# Patient Record
Sex: FEMALE | Race: WHITE | URBAN - METROPOLITAN AREA
[De-identification: names, ages, dates, MRNs, and addresses within clinical notes are randomized per-mention and may not be internally consistent; named-entity substitution may affect disease eponyms.]

---

## 2017-04-24 ENCOUNTER — HOSPITAL ENCOUNTER (OUTPATIENT)
Dept: WOUND CARE | Age: 57
Discharge: HOME | End: 2017-04-24
Attending: SURGERY | Admitting: SURGERY

## 2017-04-24 VITALS
WEIGHT: 293 LBS | DIASTOLIC BLOOD PRESSURE: 105 MMHG | SYSTOLIC BLOOD PRESSURE: 197 MMHG | HEART RATE: 88 BPM | RESPIRATION RATE: 18 BRPM

## 2017-04-24 DIAGNOSIS — M79.605 PAIN AND SWELLING OF LEFT LOWER EXTREMITY: Primary | ICD-10-CM

## 2017-04-24 DIAGNOSIS — I87.332 CHRONIC VENOUS HYPERTENSION (IDIOPATHIC) WITH ULCER AND INFLAMMATION OF LEFT LOWER EXTREMITY (HCC): ICD-10-CM

## 2017-04-24 DIAGNOSIS — M79.604 PAIN AND SWELLING OF LOWER EXTREMITY, RIGHT: ICD-10-CM

## 2017-04-24 DIAGNOSIS — M79.89 PAIN AND SWELLING OF LOWER EXTREMITY, RIGHT: ICD-10-CM

## 2017-04-24 DIAGNOSIS — L97.929 CHRONIC VENOUS HYPERTENSION (IDIOPATHIC) WITH ULCER AND INFLAMMATION OF LEFT LOWER EXTREMITY (HCC): ICD-10-CM

## 2017-04-24 DIAGNOSIS — M79.89 PAIN AND SWELLING OF LEFT LOWER EXTREMITY: Primary | ICD-10-CM

## 2017-04-24 PROCEDURE — 99203 OFFICE O/P NEW LOW 30 MIN: CPT | Performed by: SURGERY

## 2017-04-24 PROCEDURE — 11045 DBRDMT SUBQ TISS EACH ADDL: CPT | Performed by: SURGERY

## 2017-04-24 PROCEDURE — 11042 DBRDMT SUBQ TIS 1ST 20SQCM/<: CPT | Performed by: SURGERY

## 2017-04-24 RX ORDER — CELECOXIB 100 MG/1
200 CAPSULE ORAL 2 TIMES DAILY
COMMUNITY
End: 2017-11-14 | Stop reason: DRUGHIGH

## 2017-04-24 RX ORDER — DOXYCYCLINE HYCLATE 100 MG/1
100 CAPSULE ORAL 2 TIMES DAILY
COMMUNITY
Start: 2017-04-19 | End: 2017-05-03

## 2017-04-24 RX ORDER — LOSARTAN POTASSIUM 50 MG/1
100 TABLET ORAL DAILY
COMMUNITY
End: 2017-11-14 | Stop reason: DRUGHIGH

## 2017-04-24 RX ORDER — LIDOCAINE HYDROCHLORIDE 40 MG/ML
SOLUTION TOPICAL ONCE
Status: DISCONTINUED | OUTPATIENT
Start: 2017-04-24 | End: 2017-04-25 | Stop reason: HOSPADM

## 2017-04-27 ENCOUNTER — HOSPITAL ENCOUNTER (OUTPATIENT)
Dept: WOUND CARE | Age: 57
Discharge: HOME | End: 2017-04-27
Attending: SURGERY | Admitting: SURGERY

## 2017-04-27 VITALS
HEART RATE: 86 BPM | SYSTOLIC BLOOD PRESSURE: 179 MMHG | DIASTOLIC BLOOD PRESSURE: 101 MMHG | RESPIRATION RATE: 16 BRPM | TEMPERATURE: 98.3 F

## 2017-05-01 ENCOUNTER — HOSPITAL ENCOUNTER (OUTPATIENT)
Dept: WOUND CARE | Age: 57
Discharge: HOME | End: 2017-05-01
Attending: SURGERY | Admitting: SURGERY

## 2017-05-01 VITALS — HEART RATE: 79 BPM | DIASTOLIC BLOOD PRESSURE: 94 MMHG | SYSTOLIC BLOOD PRESSURE: 185 MMHG | RESPIRATION RATE: 18 BRPM

## 2017-05-01 DIAGNOSIS — I87.332 CHRONIC VENOUS HYPERTENSION (IDIOPATHIC) WITH ULCER AND INFLAMMATION OF LEFT LOWER EXTREMITY (HCC): Primary | ICD-10-CM

## 2017-05-01 DIAGNOSIS — L97.929 CHRONIC VENOUS HYPERTENSION (IDIOPATHIC) WITH ULCER AND INFLAMMATION OF LEFT LOWER EXTREMITY (HCC): Primary | ICD-10-CM

## 2017-05-01 PROCEDURE — 11042 DBRDMT SUBQ TIS 1ST 20SQCM/<: CPT | Performed by: SURGERY

## 2017-05-01 RX ORDER — LIDOCAINE HYDROCHLORIDE 40 MG/ML
SOLUTION TOPICAL ONCE
Status: DISCONTINUED | OUTPATIENT
Start: 2017-05-01 | End: 2017-05-02 | Stop reason: HOSPADM

## 2017-05-04 ENCOUNTER — HOSPITAL ENCOUNTER (OUTPATIENT)
Dept: WOUND CARE | Age: 57
Discharge: HOME | End: 2017-05-04
Attending: SURGERY | Admitting: SURGERY

## 2017-05-04 ENCOUNTER — HOSPITAL ENCOUNTER (OUTPATIENT)
Dept: VASCULAR LAB | Age: 57
Discharge: HOME | End: 2017-05-04
Attending: SURGERY | Admitting: SURGERY

## 2017-05-04 DIAGNOSIS — M79.604 PAIN AND SWELLING OF LOWER EXTREMITY, RIGHT: ICD-10-CM

## 2017-05-04 DIAGNOSIS — M79.605 PAIN AND SWELLING OF LEFT LOWER EXTREMITY: ICD-10-CM

## 2017-05-04 DIAGNOSIS — M79.605 PAIN OF LEFT LEG: ICD-10-CM

## 2017-05-04 DIAGNOSIS — M79.89 PAIN AND SWELLING OF LEFT LOWER EXTREMITY: ICD-10-CM

## 2017-05-04 DIAGNOSIS — M79.89 PAIN AND SWELLING OF LOWER EXTREMITY, RIGHT: ICD-10-CM

## 2017-05-08 ENCOUNTER — HOSPITAL ENCOUNTER (OUTPATIENT)
Dept: WOUND CARE | Age: 57
Discharge: HOME | End: 2017-05-08
Attending: SURGERY | Admitting: SURGERY

## 2017-05-08 VITALS
TEMPERATURE: 98.9 F | SYSTOLIC BLOOD PRESSURE: 187 MMHG | RESPIRATION RATE: 16 BRPM | DIASTOLIC BLOOD PRESSURE: 87 MMHG | HEART RATE: 89 BPM

## 2017-05-08 DIAGNOSIS — L97.929 CHRONIC VENOUS HYPERTENSION (IDIOPATHIC) WITH ULCER AND INFLAMMATION OF LEFT LOWER EXTREMITY (HCC): Primary | ICD-10-CM

## 2017-05-08 DIAGNOSIS — I87.332 CHRONIC VENOUS HYPERTENSION (IDIOPATHIC) WITH ULCER AND INFLAMMATION OF LEFT LOWER EXTREMITY (HCC): Primary | ICD-10-CM

## 2017-05-08 PROCEDURE — 11042 DBRDMT SUBQ TIS 1ST 20SQCM/<: CPT | Performed by: SURGERY

## 2017-05-08 RX ORDER — LIDOCAINE HYDROCHLORIDE 40 MG/ML
SOLUTION TOPICAL ONCE
Status: DISCONTINUED | OUTPATIENT
Start: 2017-05-08 | End: 2017-05-09 | Stop reason: HOSPADM

## 2017-05-15 ENCOUNTER — HOSPITAL ENCOUNTER (OUTPATIENT)
Dept: WOUND CARE | Age: 57
Discharge: HOME | End: 2017-05-15
Attending: SURGERY | Admitting: SURGERY

## 2017-05-15 VITALS — DIASTOLIC BLOOD PRESSURE: 87 MMHG | SYSTOLIC BLOOD PRESSURE: 194 MMHG | RESPIRATION RATE: 17 BRPM | HEART RATE: 83 BPM

## 2017-05-15 DIAGNOSIS — L97.929 CHRONIC VENOUS HYPERTENSION (IDIOPATHIC) WITH ULCER AND INFLAMMATION OF LEFT LOWER EXTREMITY (HCC): Primary | ICD-10-CM

## 2017-05-15 DIAGNOSIS — I87.332 CHRONIC VENOUS HYPERTENSION (IDIOPATHIC) WITH ULCER AND INFLAMMATION OF LEFT LOWER EXTREMITY (HCC): Primary | ICD-10-CM

## 2017-05-15 PROCEDURE — 11042 DBRDMT SUBQ TIS 1ST 20SQCM/<: CPT | Performed by: SURGERY

## 2017-05-15 RX ORDER — LIDOCAINE HYDROCHLORIDE 40 MG/ML
SOLUTION TOPICAL ONCE
Status: DISCONTINUED | OUTPATIENT
Start: 2017-05-15 | End: 2017-05-16 | Stop reason: HOSPADM

## 2017-05-18 ENCOUNTER — TELEPHONE (OUTPATIENT)
Dept: SURGERY | Age: 57
End: 2017-05-18

## 2017-05-22 ENCOUNTER — HOSPITAL ENCOUNTER (OUTPATIENT)
Dept: WOUND CARE | Age: 57
Discharge: HOME | End: 2017-05-22
Attending: SURGERY | Admitting: SURGERY

## 2017-05-22 VITALS — TEMPERATURE: 99.3 F | SYSTOLIC BLOOD PRESSURE: 175 MMHG | RESPIRATION RATE: 16 BRPM | DIASTOLIC BLOOD PRESSURE: 97 MMHG

## 2017-05-22 DIAGNOSIS — L97.929 CHRONIC VENOUS HYPERTENSION (IDIOPATHIC) WITH ULCER AND INFLAMMATION OF LEFT LOWER EXTREMITY (HCC): Primary | ICD-10-CM

## 2017-05-22 DIAGNOSIS — I87.332 CHRONIC VENOUS HYPERTENSION (IDIOPATHIC) WITH ULCER AND INFLAMMATION OF LEFT LOWER EXTREMITY (HCC): Primary | ICD-10-CM

## 2017-05-22 PROCEDURE — 11042 DBRDMT SUBQ TIS 1ST 20SQCM/<: CPT | Performed by: SURGERY

## 2017-05-22 RX ORDER — LIDOCAINE HYDROCHLORIDE 40 MG/ML
SOLUTION TOPICAL ONCE
Status: DISCONTINUED | OUTPATIENT
Start: 2017-05-22 | End: 2017-05-23 | Stop reason: HOSPADM

## 2017-05-30 ENCOUNTER — HOSPITAL ENCOUNTER (OUTPATIENT)
Dept: WOUND CARE | Age: 57
Discharge: HOME | End: 2017-05-30
Attending: SURGERY | Admitting: SURGERY

## 2017-05-30 VITALS — RESPIRATION RATE: 16 BRPM | DIASTOLIC BLOOD PRESSURE: 94 MMHG | SYSTOLIC BLOOD PRESSURE: 190 MMHG | HEART RATE: 79 BPM

## 2017-05-30 PROCEDURE — 11042 DBRDMT SUBQ TIS 1ST 20SQCM/<: CPT | Performed by: SURGERY

## 2017-05-30 RX ORDER — HYDROCODONE BITARTRATE AND ACETAMINOPHEN 5; 325 MG/1; MG/1
1 TABLET ORAL EVERY 6 HOURS PRN
Qty: 20 TABLET | Refills: 0 | Status: SHIPPED | OUTPATIENT
Start: 2017-05-30 | End: 2017-10-02 | Stop reason: ALTCHOICE

## 2017-05-30 RX ORDER — LIDOCAINE HYDROCHLORIDE 40 MG/ML
SOLUTION TOPICAL ONCE
Status: DISCONTINUED | OUTPATIENT
Start: 2017-05-30 | End: 2017-05-31 | Stop reason: HOSPADM

## 2017-06-05 ENCOUNTER — HOSPITAL ENCOUNTER (OUTPATIENT)
Dept: WOUND CARE | Age: 57
Discharge: HOME | End: 2017-06-05
Attending: SURGERY | Admitting: SURGERY

## 2017-06-05 VITALS — DIASTOLIC BLOOD PRESSURE: 93 MMHG | HEART RATE: 79 BPM | SYSTOLIC BLOOD PRESSURE: 183 MMHG | RESPIRATION RATE: 16 BRPM

## 2017-06-05 DIAGNOSIS — L97.929 VENOUS ULCER OF LEFT LEG (HCC): Primary | ICD-10-CM

## 2017-06-05 DIAGNOSIS — I83.029 VENOUS ULCER OF LEFT LEG (HCC): Primary | ICD-10-CM

## 2017-06-05 PROCEDURE — 11042 DBRDMT SUBQ TIS 1ST 20SQCM/<: CPT | Performed by: SURGERY

## 2017-06-05 RX ORDER — LIDOCAINE HYDROCHLORIDE 40 MG/ML
SOLUTION TOPICAL ONCE
Status: DISCONTINUED | OUTPATIENT
Start: 2017-06-05 | End: 2017-06-06 | Stop reason: HOSPADM

## 2017-06-08 ENCOUNTER — TELEPHONE (OUTPATIENT)
Dept: SURGERY | Age: 57
End: 2017-06-08

## 2017-06-12 ENCOUNTER — HOSPITAL ENCOUNTER (OUTPATIENT)
Dept: WOUND CARE | Age: 57
Discharge: HOME | End: 2017-06-12
Attending: PHYSICIAN ASSISTANT | Admitting: PHYSICIAN ASSISTANT

## 2017-06-12 VITALS — DIASTOLIC BLOOD PRESSURE: 83 MMHG | HEART RATE: 88 BPM | RESPIRATION RATE: 16 BRPM | SYSTOLIC BLOOD PRESSURE: 163 MMHG

## 2017-06-12 RX ORDER — LIDOCAINE HYDROCHLORIDE 40 MG/ML
SOLUTION TOPICAL ONCE
Status: DISCONTINUED | OUTPATIENT
Start: 2017-06-12 | End: 2017-06-13 | Stop reason: HOSPADM

## 2017-06-20 ENCOUNTER — HOSPITAL ENCOUNTER (OUTPATIENT)
Dept: CARDIAC CATH/INVASIVE PROCEDURES | Age: 57
Discharge: HOME | End: 2017-06-20
Attending: SURGERY | Admitting: SURGERY

## 2017-06-20 VITALS — BODY MASS INDEX: 48.82 KG/M2 | HEIGHT: 65 IN | WEIGHT: 293 LBS

## 2017-06-20 DIAGNOSIS — I87.332 CHRONIC VENOUS HYPERTENSION (IDIOPATHIC) WITH ULCER AND INFLAMMATION OF LEFT LOWER EXTREMITY (HCC): ICD-10-CM

## 2017-06-20 DIAGNOSIS — L97.929 VENOUS ULCER OF LEFT LEG (HCC): Primary | ICD-10-CM

## 2017-06-20 DIAGNOSIS — I83.029 VENOUS ULCER OF LEFT LEG (HCC): Primary | ICD-10-CM

## 2017-06-20 DIAGNOSIS — L97.929 CHRONIC VENOUS HYPERTENSION (IDIOPATHIC) WITH ULCER AND INFLAMMATION OF LEFT LOWER EXTREMITY (HCC): ICD-10-CM

## 2017-06-20 PROCEDURE — 36478 ENDOVENOUS LASER 1ST VEIN: CPT | Performed by: SURGERY

## 2017-06-20 RX ORDER — MORPHINE SULFATE 10 MG/ML
2 INJECTION, SOLUTION INTRAMUSCULAR; INTRAVENOUS
Status: ACTIVE | OUTPATIENT
Start: 2017-06-20 | End: 2017-06-20

## 2017-06-20 RX ORDER — SODIUM CHLORIDE 0.9 % (FLUSH) 0.9 %
10 SYRINGE (ML) INJECTION PRN
Status: DISCONTINUED | OUTPATIENT
Start: 2017-06-20 | End: 2017-06-21 | Stop reason: HOSPADM

## 2017-06-20 RX ORDER — NAPROXEN 500 MG/1
500 TABLET ORAL EVERY 12 HOURS PRN
Qty: 20 TABLET | Refills: 3 | Status: SHIPPED | OUTPATIENT
Start: 2017-06-20 | End: 2018-06-20

## 2017-06-20 RX ORDER — FENTANYL CITRATE 50 UG/ML
25 INJECTION, SOLUTION INTRAMUSCULAR; INTRAVENOUS
Status: ACTIVE | OUTPATIENT
Start: 2017-06-20 | End: 2017-06-20

## 2017-06-20 RX ORDER — SODIUM CHLORIDE 9 MG/ML
INJECTION, SOLUTION INTRAVENOUS CONTINUOUS
Status: DISCONTINUED | OUTPATIENT
Start: 2017-06-20 | End: 2017-06-21 | Stop reason: HOSPADM

## 2017-06-20 RX ORDER — ONDANSETRON 2 MG/ML
4 INJECTION INTRAMUSCULAR; INTRAVENOUS EVERY 6 HOURS PRN
Status: DISCONTINUED | OUTPATIENT
Start: 2017-06-20 | End: 2017-06-21 | Stop reason: HOSPADM

## 2017-06-20 RX ORDER — SODIUM CHLORIDE 0.9 % (FLUSH) 0.9 %
10 SYRINGE (ML) INJECTION EVERY 12 HOURS SCHEDULED
Status: DISCONTINUED | OUTPATIENT
Start: 2017-06-20 | End: 2017-06-21 | Stop reason: HOSPADM

## 2017-06-20 RX ORDER — ACETAMINOPHEN 325 MG/1
650 TABLET ORAL EVERY 4 HOURS PRN
Status: DISCONTINUED | OUTPATIENT
Start: 2017-06-20 | End: 2017-06-21 | Stop reason: HOSPADM

## 2017-06-20 RX ORDER — 0.9 % SODIUM CHLORIDE 0.9 %
500 INTRAVENOUS SOLUTION INTRAVENOUS PRN
Status: DISCONTINUED | OUTPATIENT
Start: 2017-06-20 | End: 2017-06-21 | Stop reason: HOSPADM

## 2017-06-26 ENCOUNTER — HOSPITAL ENCOUNTER (OUTPATIENT)
Dept: WOUND CARE | Age: 57
Discharge: HOME | End: 2017-06-26
Attending: SURGERY | Admitting: SURGERY

## 2017-06-26 ENCOUNTER — PROCEDURE VISIT (OUTPATIENT)
Dept: SURGERY | Age: 57
End: 2017-06-26

## 2017-06-26 VITALS — SYSTOLIC BLOOD PRESSURE: 157 MMHG | HEART RATE: 98 BPM | DIASTOLIC BLOOD PRESSURE: 95 MMHG | RESPIRATION RATE: 16 BRPM

## 2017-06-26 DIAGNOSIS — L97.929 VENOUS ULCER OF LEFT LEG (HCC): Primary | ICD-10-CM

## 2017-06-26 DIAGNOSIS — L97.929 CHRONIC VENOUS HYPERTENSION (IDIOPATHIC) WITH ULCER AND INFLAMMATION OF LEFT LOWER EXTREMITY (HCC): Primary | ICD-10-CM

## 2017-06-26 DIAGNOSIS — I87.332 CHRONIC VENOUS HYPERTENSION (IDIOPATHIC) WITH ULCER AND INFLAMMATION OF LEFT LOWER EXTREMITY (HCC): Primary | ICD-10-CM

## 2017-06-26 DIAGNOSIS — L97.929 VENOUS ULCER OF LEFT LEG (HCC): ICD-10-CM

## 2017-06-26 DIAGNOSIS — M79.89 PAIN AND SWELLING OF LEFT LOWER EXTREMITY: ICD-10-CM

## 2017-06-26 DIAGNOSIS — I83.029 VENOUS ULCER OF LEFT LEG (HCC): ICD-10-CM

## 2017-06-26 DIAGNOSIS — M79.605 PAIN AND SWELLING OF LEFT LOWER EXTREMITY: ICD-10-CM

## 2017-06-26 DIAGNOSIS — I83.029 VENOUS ULCER OF LEFT LEG (HCC): Primary | ICD-10-CM

## 2017-06-26 PROCEDURE — 93970 EXTREMITY STUDY: CPT | Performed by: SURGERY

## 2017-06-26 PROCEDURE — 11042 DBRDMT SUBQ TIS 1ST 20SQCM/<: CPT | Performed by: SURGERY

## 2017-06-26 RX ORDER — LIDOCAINE HYDROCHLORIDE 40 MG/ML
SOLUTION TOPICAL ONCE
Status: DISCONTINUED | OUTPATIENT
Start: 2017-06-26 | End: 2017-06-27 | Stop reason: HOSPADM

## 2017-07-03 ENCOUNTER — HOSPITAL ENCOUNTER (OUTPATIENT)
Dept: WOUND CARE | Age: 57
Discharge: HOME | End: 2017-07-03
Attending: SURGERY | Admitting: SURGERY

## 2017-07-03 VITALS — RESPIRATION RATE: 16 BRPM | SYSTOLIC BLOOD PRESSURE: 196 MMHG | HEART RATE: 87 BPM | DIASTOLIC BLOOD PRESSURE: 89 MMHG

## 2017-07-03 DIAGNOSIS — I87.332 CHRONIC VENOUS HYPERTENSION (IDIOPATHIC) WITH ULCER AND INFLAMMATION OF LEFT LOWER EXTREMITY (HCC): Primary | ICD-10-CM

## 2017-07-03 DIAGNOSIS — L97.929 CHRONIC VENOUS HYPERTENSION (IDIOPATHIC) WITH ULCER AND INFLAMMATION OF LEFT LOWER EXTREMITY (HCC): Primary | ICD-10-CM

## 2017-07-03 DIAGNOSIS — L97.929 VENOUS ULCER OF LEFT LEG (HCC): ICD-10-CM

## 2017-07-03 DIAGNOSIS — I83.029 VENOUS ULCER OF LEFT LEG (HCC): ICD-10-CM

## 2017-07-03 PROCEDURE — 11042 DBRDMT SUBQ TIS 1ST 20SQCM/<: CPT | Performed by: SURGERY

## 2017-07-03 RX ORDER — LIDOCAINE HYDROCHLORIDE 40 MG/ML
SOLUTION TOPICAL ONCE
Status: DISCONTINUED | OUTPATIENT
Start: 2017-07-03 | End: 2017-07-04 | Stop reason: HOSPADM

## 2017-07-03 RX ORDER — BETAMETHASONE DIPROPIONATE 0.05 %
OINTMENT (GRAM) TOPICAL
Qty: 50 G | Refills: 1 | Status: SHIPPED | OUTPATIENT
Start: 2017-07-03 | End: 2017-08-14 | Stop reason: ALTCHOICE

## 2017-07-10 ENCOUNTER — HOSPITAL ENCOUNTER (OUTPATIENT)
Dept: WOUND CARE | Age: 57
Discharge: HOME | End: 2017-07-10
Attending: SURGERY | Admitting: SURGERY

## 2017-07-10 VITALS — DIASTOLIC BLOOD PRESSURE: 87 MMHG | HEART RATE: 89 BPM | RESPIRATION RATE: 17 BRPM | SYSTOLIC BLOOD PRESSURE: 195 MMHG

## 2017-07-10 DIAGNOSIS — I83.029 VENOUS ULCER OF LEFT LEG (HCC): ICD-10-CM

## 2017-07-10 DIAGNOSIS — L97.929 CHRONIC VENOUS HYPERTENSION (IDIOPATHIC) WITH ULCER AND INFLAMMATION OF LEFT LOWER EXTREMITY (HCC): Primary | ICD-10-CM

## 2017-07-10 DIAGNOSIS — I87.332 CHRONIC VENOUS HYPERTENSION (IDIOPATHIC) WITH ULCER AND INFLAMMATION OF LEFT LOWER EXTREMITY (HCC): Primary | ICD-10-CM

## 2017-07-10 DIAGNOSIS — L97.929 VENOUS ULCER OF LEFT LEG (HCC): ICD-10-CM

## 2017-07-10 PROCEDURE — 11042 DBRDMT SUBQ TIS 1ST 20SQCM/<: CPT | Performed by: SURGERY

## 2017-07-10 RX ORDER — LIDOCAINE HYDROCHLORIDE 40 MG/ML
SOLUTION TOPICAL ONCE
Status: DISCONTINUED | OUTPATIENT
Start: 2017-07-10 | End: 2017-07-11 | Stop reason: HOSPADM

## 2017-07-17 ENCOUNTER — HOSPITAL ENCOUNTER (OUTPATIENT)
Dept: WOUND CARE | Age: 57
Discharge: HOME | End: 2017-07-17
Attending: SURGERY | Admitting: SURGERY

## 2017-07-24 ENCOUNTER — HOSPITAL ENCOUNTER (OUTPATIENT)
Dept: WOUND CARE | Age: 57
Discharge: HOME | End: 2017-07-24
Attending: SURGERY | Admitting: SURGERY

## 2017-07-24 VITALS — HEART RATE: 87 BPM | SYSTOLIC BLOOD PRESSURE: 158 MMHG | DIASTOLIC BLOOD PRESSURE: 100 MMHG | RESPIRATION RATE: 16 BRPM

## 2017-07-24 DIAGNOSIS — L97.929 VENOUS ULCER OF LEFT LEG (HCC): Primary | ICD-10-CM

## 2017-07-24 DIAGNOSIS — I83.029 VENOUS ULCER OF LEFT LEG (HCC): Primary | ICD-10-CM

## 2017-07-24 PROCEDURE — 11042 DBRDMT SUBQ TIS 1ST 20SQCM/<: CPT | Performed by: SURGERY

## 2017-07-24 RX ORDER — LIDOCAINE HYDROCHLORIDE 40 MG/ML
SOLUTION TOPICAL ONCE
Status: DISCONTINUED | OUTPATIENT
Start: 2017-07-24 | End: 2017-07-25 | Stop reason: HOSPADM

## 2017-07-31 ENCOUNTER — HOSPITAL ENCOUNTER (OUTPATIENT)
Dept: WOUND CARE | Age: 57
Discharge: HOME | End: 2017-07-31
Attending: SURGERY | Admitting: SURGERY

## 2017-07-31 VITALS — HEART RATE: 101 BPM | RESPIRATION RATE: 17 BRPM | SYSTOLIC BLOOD PRESSURE: 198 MMHG | DIASTOLIC BLOOD PRESSURE: 99 MMHG

## 2017-07-31 DIAGNOSIS — L97.929 VENOUS ULCER OF LEFT LEG (HCC): ICD-10-CM

## 2017-07-31 DIAGNOSIS — I87.332 CHRONIC VENOUS HYPERTENSION (IDIOPATHIC) WITH ULCER AND INFLAMMATION OF LEFT LOWER EXTREMITY (HCC): Primary | ICD-10-CM

## 2017-07-31 DIAGNOSIS — L97.929 CHRONIC VENOUS HYPERTENSION (IDIOPATHIC) WITH ULCER AND INFLAMMATION OF LEFT LOWER EXTREMITY (HCC): Primary | ICD-10-CM

## 2017-07-31 DIAGNOSIS — I83.029 VENOUS ULCER OF LEFT LEG (HCC): ICD-10-CM

## 2017-07-31 PROCEDURE — 11042 DBRDMT SUBQ TIS 1ST 20SQCM/<: CPT | Performed by: SURGERY

## 2017-07-31 RX ORDER — LIDOCAINE HYDROCHLORIDE 40 MG/ML
SOLUTION TOPICAL ONCE
Status: DISCONTINUED | OUTPATIENT
Start: 2017-07-31 | End: 2017-08-01 | Stop reason: HOSPADM

## 2017-08-07 ENCOUNTER — HOSPITAL ENCOUNTER (OUTPATIENT)
Dept: WOUND CARE | Age: 57
Discharge: HOME | End: 2017-08-07
Attending: PHYSICIAN ASSISTANT | Admitting: PHYSICIAN ASSISTANT

## 2017-08-07 VITALS
HEART RATE: 79 BPM | BODY MASS INDEX: 48.82 KG/M2 | HEIGHT: 65 IN | TEMPERATURE: 97.9 F | DIASTOLIC BLOOD PRESSURE: 94 MMHG | SYSTOLIC BLOOD PRESSURE: 191 MMHG | RESPIRATION RATE: 18 BRPM | WEIGHT: 293 LBS

## 2017-08-07 RX ORDER — LIDOCAINE HYDROCHLORIDE 40 MG/ML
SOLUTION TOPICAL ONCE
Status: DISCONTINUED | OUTPATIENT
Start: 2017-08-07 | End: 2017-08-08 | Stop reason: HOSPADM

## 2017-08-09 ENCOUNTER — HOSPITAL ENCOUNTER (OUTPATIENT)
Dept: NUCLEAR MEDICINE | Age: 57
Discharge: HOME | End: 2017-08-28
Attending: INTERNAL MEDICINE | Admitting: INTERNAL MEDICINE

## 2017-08-09 ENCOUNTER — HOSPITAL ENCOUNTER (OUTPATIENT)
Dept: NON INVASIVE DIAGNOSTICS | Age: 57
Discharge: HOME | End: 2017-08-10
Admitting: INTERNAL MEDICINE

## 2017-08-09 DIAGNOSIS — R07.9 CHEST PAIN: ICD-10-CM

## 2017-08-09 LAB
LV EF: 66 %
LVEF MODALITY: NORMAL

## 2017-08-10 ENCOUNTER — HOSPITAL ENCOUNTER (OUTPATIENT)
Dept: NUCLEAR MEDICINE | Age: 57
Discharge: HOME | End: 2017-08-11
Admitting: INTERNAL MEDICINE

## 2017-08-10 ENCOUNTER — HOSPITAL ENCOUNTER (OUTPATIENT)
Dept: OTHER | Age: 57
Discharge: HOME | End: 2017-08-10
Attending: INTERNAL MEDICINE | Admitting: INTERNAL MEDICINE

## 2017-08-10 DIAGNOSIS — M54.40 ACUTE RIGHT-SIDED LOW BACK PAIN WITH SCIATICA, SCIATICA LATERALITY UNSPECIFIED: ICD-10-CM

## 2017-08-10 DIAGNOSIS — R07.9 CHEST PAIN: ICD-10-CM

## 2017-08-14 ENCOUNTER — HOSPITAL ENCOUNTER (OUTPATIENT)
Dept: WOUND CARE | Age: 57
Discharge: HOME | End: 2017-08-14
Attending: SURGERY | Admitting: SURGERY

## 2017-08-14 DIAGNOSIS — I83.029 VENOUS ULCER OF LEFT LEG (HCC): Primary | ICD-10-CM

## 2017-08-14 DIAGNOSIS — L97.929 VENOUS ULCER OF LEFT LEG (HCC): Primary | ICD-10-CM

## 2017-08-14 PROCEDURE — 11042 DBRDMT SUBQ TIS 1ST 20SQCM/<: CPT | Performed by: SURGERY

## 2017-08-14 RX ORDER — LIDOCAINE HYDROCHLORIDE 40 MG/ML
SOLUTION TOPICAL ONCE
Status: DISCONTINUED | OUTPATIENT
Start: 2017-08-14 | End: 2017-08-15 | Stop reason: HOSPADM

## 2017-08-17 ENCOUNTER — HOSPITAL ENCOUNTER (OUTPATIENT)
Dept: WOUND CARE | Age: 57
Discharge: HOME | End: 2017-08-17
Attending: SURGERY | Admitting: SURGERY

## 2017-08-21 ENCOUNTER — HOSPITAL ENCOUNTER (OUTPATIENT)
Dept: WOUND CARE | Age: 57
Discharge: HOME | End: 2017-08-21
Attending: SURGERY | Admitting: SURGERY

## 2017-08-21 VITALS
SYSTOLIC BLOOD PRESSURE: 192 MMHG | HEART RATE: 87 BPM | DIASTOLIC BLOOD PRESSURE: 81 MMHG | RESPIRATION RATE: 17 BRPM | TEMPERATURE: 97.4 F

## 2017-08-21 DIAGNOSIS — I87.332 CHRONIC VENOUS HYPERTENSION (IDIOPATHIC) WITH ULCER AND INFLAMMATION OF LEFT LOWER EXTREMITY (HCC): Primary | ICD-10-CM

## 2017-08-21 DIAGNOSIS — L97.929 CHRONIC VENOUS HYPERTENSION (IDIOPATHIC) WITH ULCER AND INFLAMMATION OF LEFT LOWER EXTREMITY (HCC): Primary | ICD-10-CM

## 2017-08-21 PROCEDURE — 99212 OFFICE O/P EST SF 10 MIN: CPT | Performed by: SURGERY

## 2017-08-21 RX ORDER — LIDOCAINE HYDROCHLORIDE 40 MG/ML
SOLUTION TOPICAL ONCE
Status: DISCONTINUED | OUTPATIENT
Start: 2017-08-21 | End: 2017-08-22 | Stop reason: HOSPADM

## 2017-09-11 ENCOUNTER — HOSPITAL ENCOUNTER (OUTPATIENT)
Dept: WOUND CARE | Age: 57
Discharge: HOME | End: 2017-09-11
Attending: SURGERY | Admitting: SURGERY

## 2017-09-11 VITALS
DIASTOLIC BLOOD PRESSURE: 90 MMHG | TEMPERATURE: 98 F | SYSTOLIC BLOOD PRESSURE: 179 MMHG | HEART RATE: 83 BPM | RESPIRATION RATE: 18 BRPM

## 2017-09-11 DIAGNOSIS — L97.929 VENOUS ULCER OF LEFT LEG (HCC): Primary | ICD-10-CM

## 2017-09-11 DIAGNOSIS — I83.029 VENOUS ULCER OF LEFT LEG (HCC): Primary | ICD-10-CM

## 2017-09-11 PROCEDURE — 11042 DBRDMT SUBQ TIS 1ST 20SQCM/<: CPT | Performed by: SURGERY

## 2017-09-11 RX ORDER — CIPROFLOXACIN 500 MG/1
500 TABLET, FILM COATED ORAL 2 TIMES DAILY
Qty: 20 TABLET | Refills: 0 | Status: SHIPPED | OUTPATIENT
Start: 2017-09-11 | End: 2017-09-21

## 2017-09-11 RX ORDER — LIDOCAINE HYDROCHLORIDE 40 MG/ML
SOLUTION TOPICAL ONCE
Status: DISCONTINUED | OUTPATIENT
Start: 2017-09-11 | End: 2017-09-12 | Stop reason: HOSPADM

## 2017-09-20 ENCOUNTER — HOSPITAL ENCOUNTER (OUTPATIENT)
Dept: WOUND CARE | Age: 57
Discharge: HOME | End: 2017-09-20
Attending: PHYSICIAN ASSISTANT | Admitting: PHYSICIAN ASSISTANT

## 2017-09-20 VITALS — SYSTOLIC BLOOD PRESSURE: 179 MMHG | DIASTOLIC BLOOD PRESSURE: 108 MMHG | RESPIRATION RATE: 20 BRPM | HEART RATE: 95 BPM

## 2017-09-20 RX ORDER — LIDOCAINE HYDROCHLORIDE 40 MG/ML
SOLUTION TOPICAL ONCE
Status: DISCONTINUED | OUTPATIENT
Start: 2017-09-20 | End: 2017-09-21 | Stop reason: HOSPADM

## 2017-09-25 ENCOUNTER — HOSPITAL ENCOUNTER (OUTPATIENT)
Dept: WOUND CARE | Age: 57
Discharge: HOME | End: 2017-09-25
Attending: FAMILY MEDICINE | Admitting: FAMILY MEDICINE

## 2017-09-25 VITALS
RESPIRATION RATE: 18 BRPM | TEMPERATURE: 99.4 F | HEART RATE: 79 BPM | DIASTOLIC BLOOD PRESSURE: 93 MMHG | SYSTOLIC BLOOD PRESSURE: 162 MMHG

## 2017-09-25 PROCEDURE — 11042 DBRDMT SUBQ TIS 1ST 20SQCM/<: CPT | Performed by: FAMILY MEDICINE

## 2017-09-25 RX ORDER — LIDOCAINE HYDROCHLORIDE 40 MG/ML
SOLUTION TOPICAL ONCE
Status: DISCONTINUED | OUTPATIENT
Start: 2017-09-25 | End: 2017-09-26 | Stop reason: HOSPADM

## 2017-10-02 ENCOUNTER — HOSPITAL ENCOUNTER (OUTPATIENT)
Dept: WOUND CARE | Age: 57
Discharge: HOME | End: 2017-10-02
Attending: SURGERY | Admitting: SURGERY

## 2017-10-02 VITALS
HEART RATE: 84 BPM | TEMPERATURE: 97.9 F | DIASTOLIC BLOOD PRESSURE: 84 MMHG | SYSTOLIC BLOOD PRESSURE: 179 MMHG | RESPIRATION RATE: 16 BRPM

## 2017-10-02 DIAGNOSIS — I87.332 CHRONIC VENOUS HYPERTENSION (IDIOPATHIC) WITH ULCER AND INFLAMMATION OF LEFT LOWER EXTREMITY (HCC): Primary | ICD-10-CM

## 2017-10-02 DIAGNOSIS — L97.929 CHRONIC VENOUS HYPERTENSION (IDIOPATHIC) WITH ULCER AND INFLAMMATION OF LEFT LOWER EXTREMITY (HCC): Primary | ICD-10-CM

## 2017-10-02 PROCEDURE — 11042 DBRDMT SUBQ TIS 1ST 20SQCM/<: CPT | Performed by: SURGERY

## 2017-10-02 RX ORDER — LIDOCAINE HYDROCHLORIDE 40 MG/ML
SOLUTION TOPICAL ONCE
Status: DISCONTINUED | OUTPATIENT
Start: 2017-10-02 | End: 2017-10-03 | Stop reason: HOSPADM

## 2017-10-02 NOTE — IP AVS SNAPSHOT
Patient Information     Patient Name MIKE Zamora 1960         This is your updated medication list to keep with you all times      ASK your doctor about these medications     amLODIPine 10 MG tablet   Commonly known as:  NORVASC       celecoxib 100 MG capsule   Commonly known as:  CELEBREX       losartan 50 MG tablet   Commonly known as:  COZAAR       naproxen 500 MG EC tablet   Commonly known as:  EC-NAPROSYN   Take 1 tablet by mouth every 12 hours as needed for Pain

## 2017-10-02 NOTE — IP AVS SNAPSHOT
· Your care plan at home      You may receive a survey regarding the care you received during your stay. Your input is valuable to us. We encourage you to complete and return your survey in the envelope provided. We hope you will choose us in the future for your healthcare needs. Patient Information     Patient Name MIKE Ramos 1960      Care Provided at:     Name Address Phone       [address] [phone]            Your Visit    Here you will find information about your visit, including the reason for your visit. Please take this sheet with you when you visit your doctor or other health care provider in the future. It will help determine the best possible medical care for you at that time. If you have any questions once you leave the hospital, please call the department phone number listed below. Why you were here     Your primary diagnosis was:  Not on File      Visit Information     Date & Time Provider Department Dept. Phone    10/2/2017 Sriram Green MD [facility] [phone]       Follow-up Appointments    Below is a list of your follow-up and future appointments. This may not be a complete list as you may have made appointments directly with providers that we are not aware of or your providers may have made some for you. Please call your providers to confirm appointments. It is important to keep your appointments. Please bring your current insurance card, photo ID, co-pay, and all medication bottles to your appointment. If self-pay, payment is expected at the time of service. Preventive Care        Date Due    Hepatitis C screening is recommended for all adults regardless of risk factors born between [facility] at least once (lifetime) who have never been tested.  1960    HIV screening is recommended for all people regardless of risk factors 4. Enter your Social Security Number ([id number]) and Date of Birth (mm/dd/yyyy) as indicated and click Submit. You will be taken to the next sign-up page. 5. Create a [employer] ID. This will be your [employer] login ID and cannot be changed, so think of one that is secure and easy to remember. 6. Create a [employer] password. You can change your password at any time. 7. Enter your Password Reset Question and Answer. This can be used at a later time if you forget your password. 8. Enter your e-mail address. You will receive e-mail notification when new information is available in [address] 9. Click Sign Up. You can now view your medical record. Additional Information  If you have questions, please contact the physician practice where you receive care. Remember, [employer] is NOT to be used for urgent needs. For medical emergencies, dial 911. For questions regarding your [employer] account call [phone]. If you have a clinical question, please call your doctor's office. View your information online  ? Review your current list of  medications, immunization, and allergies. ? Review your future test results online . ? Review your discharge instructions provided by your caregivers at discharge    Certain functionality such as prescription refills, scheduling appointments or sending messages to your provider are not activated if your provider does not use [employer] in his/her office    For questions regarding your [employer] account call [phone]. If you have a clinical question, please call your doctor's office. The information on all pages of the After Visit Summary has been reviewed with me, the patient and/or responsible adult, by my health care provider(s). I had the opportunity to ask questions regarding this information. I understand I should dispose of my armband safely at home to protect my health information.  A complete copy of the After Visit Summary

## 2017-10-09 ENCOUNTER — HOSPITAL ENCOUNTER (OUTPATIENT)
Dept: WOUND CARE | Age: 57
Discharge: HOME | End: 2017-10-09
Attending: SURGERY | Admitting: SURGERY

## 2017-10-09 VITALS
DIASTOLIC BLOOD PRESSURE: 95 MMHG | HEART RATE: 93 BPM | RESPIRATION RATE: 18 BRPM | SYSTOLIC BLOOD PRESSURE: 172 MMHG | TEMPERATURE: 97.5 F

## 2017-10-09 DIAGNOSIS — I83.029 VENOUS ULCER OF LEFT LEG (HCC): Primary | ICD-10-CM

## 2017-10-09 DIAGNOSIS — L97.929 VENOUS ULCER OF LEFT LEG (HCC): Primary | ICD-10-CM

## 2017-10-09 PROCEDURE — 11042 DBRDMT SUBQ TIS 1ST 20SQCM/<: CPT | Performed by: SURGERY

## 2017-10-09 RX ORDER — LIDOCAINE HYDROCHLORIDE 40 MG/ML
SOLUTION TOPICAL ONCE
Status: DISCONTINUED | OUTPATIENT
Start: 2017-10-09 | End: 2017-10-10 | Stop reason: HOSPADM

## 2017-10-09 NOTE — PROGRESS NOTES
rash or erythema  Head: normocephalic and atraumatic  Abdomen: soft, non-tender, non-distended, normal bowel sounds, no masses or organomegaly  Musculoskeletal: normal range of motion, no joint swelling, deformity or tenderness  Neurologic: reflexes normal and symmetric, no cranial nerve deficit, gait, coordination and speech normal      Assessment:     Patient Active Problem List   Diagnosis    Umbilical hernia    Chronic venous hypertension (idiopathic) with ulcer and inflammation of left lower extremity    Pain and swelling of left lower extremity    Venous ulcer of left leg ([address]       Procedure Note    Performed by: Tobin Lu MD    Consent obtained: Yes    Time out taken:  Yes    Pain Control: Anesthetic  Anesthetic: 4% Topical Xylocaine     Debridement:Excisional Debridement    Using curette the wound was sharply debrided    down through and including the removal of epidermis, dermis and subcutaneous tissue. Devitalized Tissue Debrided:  fibrin, biofilm and slough    Pre Debridement Measurements:  Are located in the Wound Documentation Flow Sheet    Wound #: 2     Post  Debridement Measurements:  Wound 04/24/17 Venous ulcer Leg Left;Lateral;Distal #2 (Active)   Wound Image   10/9/2017 10:00 AM   Wound Type Wound 10/9/2017 10:00 AM   Wound Venous 10/9/2017 10:00 AM   Dressing Status Clean;Dry; Intact 10/2/2017 10:35 AM   Dressing Changed Changed/New 10/2/2017 10:35 AM   Dressing/Treatment Other (Comment) 10/2/2017 10:35 AM   Wound Cleansed Wound cleanser 10/9/2017 10:00 AM   Wound Length (cm) 0.8 cm 10/9/2017 10:24 AM   Wound Width (cm) 1 cm 10/9/2017 10:24 AM   Wound Depth (cm)  0.1 10/9/2017 10:24 AM   Calculated Wound Size (cm^2) (l*w) 0.8 cm^2 10/9/2017 10:24 AM   Change in Wound Size % (l*w) 86.21 10/9/2017 10:24 AM   Wound Assessment Bleeding 10/9/2017 10:24 AM   Flores-wound Assessment Dry;Pink 10/9/2017 10:00 AM   [place]%Wound Bed 0 10/9/2017 10:00 AM   Red%Wound Bed 100 10/9/2017 10:00 AM Yellow%Wound Bed 0 10/9/2017 10:00 AM   Black%Wound Bed 0 10/9/2017 10:00 AM   Purple%Wound Bed 0 10/9/2017 10:00 AM   Other%Wound Bed 0 10/9/2017 10:00 AM   Drainage Amount Moderate 10/9/2017 10:24 AM   Drainage Description Serosanguinous 10/9/2017 10:00 AM   Odor None 10/9/2017 10:00 AM   Time out Yes 10/9/2017 10:00 AM   Op First Treatment Date 04/24/17 4/24/2017 10:08 AM   Number of days: 168           Total Surface Area Debrided:  0.8 sq cm     Percentage of wound debrided 100%    Bleeding:  Minimal    Hemostasis Achieved:  not needed    Procedural Pain:  2  / 10     Post Procedural Pain:  0 / 10     Response to treatment:  Well tolerated by patient. Plan:     The nature of the patient's condition was explained in depth.  The patient was informed that their compliance to the treatment plan is paramount to successful healing and prevention of further ulceration and/or infection     Discharge Treatment  hydraphera blue and Unna wrap, f/u 1 week    Written Patient Discharge Instructions Given            Electronically signed by Elodia Little MD on 10/9/2017 at 10:26 AM

## 2017-10-16 ENCOUNTER — HOSPITAL ENCOUNTER (OUTPATIENT)
Dept: WOUND CARE | Age: 57
Discharge: HOME | End: 2017-10-16
Attending: SURGERY | Admitting: SURGERY

## 2017-10-16 VITALS — HEART RATE: 81 BPM | RESPIRATION RATE: 18 BRPM | DIASTOLIC BLOOD PRESSURE: 93 MMHG | SYSTOLIC BLOOD PRESSURE: 177 MMHG

## 2017-10-16 DIAGNOSIS — L97.929 VENOUS ULCER OF LEFT LEG (HCC): Primary | ICD-10-CM

## 2017-10-16 DIAGNOSIS — I83.029 VENOUS ULCER OF LEFT LEG (HCC): Primary | ICD-10-CM

## 2017-10-16 PROCEDURE — 11042 DBRDMT SUBQ TIS 1ST 20SQCM/<: CPT | Performed by: SURGERY

## 2017-10-16 RX ORDER — LIDOCAINE HYDROCHLORIDE 40 MG/ML
SOLUTION TOPICAL ONCE
Status: DISCONTINUED | OUTPATIENT
Start: 2017-10-16 | End: 2017-10-17 | Stop reason: HOSPADM

## 2017-10-16 NOTE — PROGRESS NOTES
[facility]  Progress Note and Procedure Note      Luis M Velázquez  AGE: 56 y.o. GENDER: female  : 1960  TODAY'S DATE:  10/16/2017    Subjective:     Chief Complaint   Patient presents with    Wound Check     VLU Left Leg         HISTORY of PRESENT ILLNESS HPI     Luis M Velázquez is a 56 y.o. female who presents today for wound evaluation. History of Wound: left calf ulcer  Wound Pain:  mild  Severity:  2 / 10   Wound Type:  venous  Modifying Factors:  venous stasis  Associated Signs/Symptoms:  none        PAST MEDICAL HISTORY        Diagnosis Date    Arthritis     Hypertension     Movement disorder        PAST SURGICAL HISTORY    Past Surgical History:   Procedure Laterality Date    APPENDECTOMY      HERNIA REPAIR      HYSTERECTOMY         FAMILY HISTORY    Family History   Problem Relation Age of Onset    Cancer Father        SOCIAL HISTORY    Social History   Substance Use Topics    Smoking status: Never Smoker    Smokeless tobacco: Never Used    Alcohol use No       ALLERGIES    Allergies   Allergen Reactions    Sulfa Antibiotics Swelling       MEDICATIONS    Current Outpatient Prescriptions on File Prior to Encounter   Medication Sig Dispense Refill    naproxen (EC-NAPROSYN) 500 MG EC tablet Take 1 tablet by mouth every 12 hours as needed for Pain 20 tablet 3    losartan (COZAAR) 50 MG tablet Take 100 mg by mouth daily       celecoxib (CELEBREX) 100 MG capsule Take 200 mg by mouth 2 times daily       amLODIPine (NORVASC) 10 MG tablet Take 5 mg by mouth daily        No current facility-administered medications on file prior to encounter. REVIEW OF SYSTEMS    A comprehensive review of systems was negative.       Objective:      BP (!) 177/93   Pulse 81   Resp 18     PHYSICAL EXAM    General Appearance: alert and oriented to person, place and time, well developed and well- nourished, in no acute distress  Skin: warm and dry, no rash or erythema  Head:

## 2017-10-23 ENCOUNTER — HOSPITAL ENCOUNTER (OUTPATIENT)
Dept: WOUND CARE | Age: 57
Discharge: HOME | End: 2017-10-23
Attending: SURGERY | Admitting: SURGERY

## 2017-10-23 VITALS
RESPIRATION RATE: 16 BRPM | SYSTOLIC BLOOD PRESSURE: 483 MMHG | TEMPERATURE: 98 F | HEART RATE: 85 BPM | DIASTOLIC BLOOD PRESSURE: 88 MMHG

## 2017-10-23 DIAGNOSIS — L97.929 VENOUS ULCER OF LEFT LEG (HCC): Primary | ICD-10-CM

## 2017-10-23 DIAGNOSIS — I83.029 VENOUS ULCER OF LEFT LEG (HCC): Primary | ICD-10-CM

## 2017-10-23 PROCEDURE — 11042 DBRDMT SUBQ TIS 1ST 20SQCM/<: CPT | Performed by: SURGERY

## 2017-10-23 RX ORDER — LIDOCAINE HYDROCHLORIDE 40 MG/ML
SOLUTION TOPICAL ONCE
Status: DISCONTINUED | OUTPATIENT
Start: 2017-10-23 | End: 2017-10-24 | Stop reason: HOSPADM

## 2017-10-23 NOTE — PROGRESS NOTES
[facility]  Progress Note and Procedure Note      Otto Franz  AGE: 56 y.o. GENDER: female  : 1960  TODAY'S DATE:  10/23/2017    Subjective:     Chief Complaint   Patient presents with    Wound Check     Left lower leg         HISTORY of PRESENT ILLNESS HPI     Otto Franz is a 56 y.o. female who presents today for wound evaluation. History of Wound: Left calf ulcer  Wound Pain:  mild  Severity:  3 / 10   Wound Type:  venous  Modifying Factors:  venous stasis  Associated Signs/Symptoms:  none        PAST MEDICAL HISTORY        Diagnosis Date    Arthritis     Hypertension     Movement disorder        PAST SURGICAL HISTORY    Past Surgical History:   Procedure Laterality Date    APPENDECTOMY      HERNIA REPAIR      HYSTERECTOMY         FAMILY HISTORY    Family History   Problem Relation Age of Onset    Cancer Father        SOCIAL HISTORY    Social History   Substance Use Topics    Smoking status: Never Smoker    Smokeless tobacco: Never Used    Alcohol use No       ALLERGIES    Allergies   Allergen Reactions    Sulfa Antibiotics Swelling       MEDICATIONS    Current Outpatient Prescriptions on File Prior to Encounter   Medication Sig Dispense Refill    naproxen (EC-NAPROSYN) 500 MG EC tablet Take 1 tablet by mouth every 12 hours as needed for Pain 20 tablet 3    losartan (COZAAR) 50 MG tablet Take 100 mg by mouth daily       celecoxib (CELEBREX) 100 MG capsule Take 200 mg by mouth 2 times daily       amLODIPine (NORVASC) 10 MG tablet Take 5 mg by mouth daily        No current facility-administered medications on file prior to encounter. REVIEW OF SYSTEMS    A comprehensive review of systems was negative.       Objective:      BP (!) 483/88   Pulse 85   Temp 98 °F (36.7 °C) (Oral)   Resp 16     PHYSICAL EXAM    General Appearance: alert and oriented to person, place and time, well developed and well- nourished, in no acute distress  Skin: warm and dry, no rash or erythema  Head: normocephalic and atraumatic  Pulmonary/Chest:  normal air movement, no respiratory distress  Musculoskeletal: normal range of motion, no joint swelling, deformity or tenderness  Neurologic: reflexes normal and symmetric, no cranial nerve deficit, gait, coordination and speech normal      Assessment:     Patient Active Problem List   Diagnosis    Umbilical hernia    Chronic venous hypertension (idiopathic) with ulcer and inflammation of left lower extremity (HCC)    Pain and swelling of left lower extremity    Venous ulcer of left leg ([address]       Procedure Note    Performed by: Claritza Ruvalcaba MD    Consent obtained: Yes    Time out taken:  Yes    Pain Control: Anesthetic  Anesthetic: 4% Topical Xylocaine     Debridement:Excisional Debridement    Using curette the wound was sharply debrided    down through and including the removal of epidermis, dermis and subcutaneous tissue. Devitalized Tissue Debrided:  fibrin, biofilm and slough    Pre Debridement Measurements:  Are located in the Wound Documentation Flow Sheet    Wound #: 2     Post  Debridement Measurements:  Wound 04/24/17 Venous ulcer Leg Left;Lateral;Distal #2 (Active)   Wound Image   10/23/2017  9:55 AM   Wound Type Wound 10/23/2017  9:55 AM   Wound Venous 10/23/2017  9:55 AM   Dressing Status Clean;Dry; Intact 10/2/2017 10:35 AM   Dressing Changed Changed/New 10/16/2017 11:22 AM   Dressing/Treatment Dry dressing; Other (Comment) 10/16/2017 11:22 AM   Wound Cleansed Wound cleanser 10/23/2017  9:55 AM   Wound Length (cm) 2 cm 10/23/2017 10:10 AM   Wound Width (cm) 2 cm 10/23/2017 10:10 AM   Wound Depth (cm)  0.1 10/23/2017 10:10 AM   Calculated Wound Size (cm^2) (l*w) 4 cm^2 10/23/2017 10:10 AM   Change in Wound Size % (l*w) 31.03 10/23/2017 10:10 AM   Wound Assessment Bleeding 10/23/2017 10:10 AM   Flores-wound Assessment Excoriated;Pink 10/23/2017  9:55 AM   [place]%Wound Bed 0 10/23/2017  9:55 AM   Red%Wound Bed 0 10/23/2017 9:55 AM   Yellow%Wound Bed 0 10/23/2017  9:55 AM   Black%Wound Bed 0 10/23/2017  9:55 AM   Purple%Wound Bed 0 10/23/2017  9:55 AM   Other%Wound Bed 0 10/23/2017  9:55 AM   Drainage Amount Moderate 10/23/2017 10:10 AM   Drainage Description Green 10/23/2017  9:55 AM   Odor None 10/23/2017  9:55 AM   Time out Yes 10/23/2017  9:55 AM   Op First Treatment Date 04/24/17 4/24/2017 10:08 AM   Number of days: 182           Total Surface Area Debrided:  4 sq cm     Percentage of wound debrided 100%    Bleeding:  Minimal    Hemostasis Achieved:  not needed    Procedural Pain:  3  / 10     Post Procedural Pain:  0 / 10     Response to treatment:  Well tolerated by patient. Plan:     The nature of the patient's condition was explained in depth.  The patient was informed that their compliance to the treatment plan is paramount to successful healing and prevention of further ulceration and/or infection     Discharge Treatment  silver alginate with Mepilex border and compression stocking, follow-up in one week    Written Patient Discharge Instructions Given            Electronically signed by Adal Hernandez MD on 10/23/2017 at 10:12 AM

## 2017-10-23 NOTE — PLAN OF CARE
Problem: Wound:  Goal: Will show signs of wound healing; wound closure and no evidence of infection  Will show signs of wound healing; wound closure and no evidence of infection  Outcome: Ongoing  Discharge instructions given. Patient verbalized understanding. Return to [facility] in 1 week.   Called/faxed orders to  [facility]    [] antibiotics    [] X-ray     [] Culture   [x] Debridement      [] HBO Evaluation    [] LABS   [] Vascular Studies []

## 2017-10-30 ENCOUNTER — HOSPITAL ENCOUNTER (OUTPATIENT)
Dept: WOUND CARE | Age: 57
Discharge: HOME | End: 2017-10-30
Attending: SURGERY | Admitting: SURGERY

## 2017-10-30 VITALS
DIASTOLIC BLOOD PRESSURE: 103 MMHG | RESPIRATION RATE: 16 BRPM | SYSTOLIC BLOOD PRESSURE: 162 MMHG | HEART RATE: 88 BPM | TEMPERATURE: 98.2 F

## 2017-10-30 DIAGNOSIS — L97.929 VENOUS ULCER OF LEFT LEG (HCC): Primary | ICD-10-CM

## 2017-10-30 DIAGNOSIS — I83.029 VENOUS ULCER OF LEFT LEG (HCC): Primary | ICD-10-CM

## 2017-10-30 PROCEDURE — 11042 DBRDMT SUBQ TIS 1ST 20SQCM/<: CPT | Performed by: SURGERY

## 2017-10-30 RX ORDER — LIDOCAINE HYDROCHLORIDE 40 MG/ML
SOLUTION TOPICAL ONCE
Status: DISCONTINUED | OUTPATIENT
Start: 2017-10-30 | End: 2017-10-31 | Stop reason: HOSPADM

## 2017-10-30 NOTE — PROGRESS NOTES
[facility]  Progress Note and Procedure Note      Kg Avelar  AGE: 56 y.o. GENDER: female  : 1960  TODAY'S DATE:  10/30/2017    Subjective:     Chief Complaint   Patient presents with    Wound Check     Left lower leg         HISTORY of PRESENT ILLNESS HPI     Kg Avelar is a 56 y.o. female who presents today for wound evaluation. History of Wound: Left calf ulcer  Wound Pain:  mild  Severity:  3 / 10   Wound Type:  venous  Modifying Factors:  venous stasis  Associated Signs/Symptoms:  none        PAST MEDICAL HISTORY        Diagnosis Date    Arthritis     Hypertension     Movement disorder        PAST SURGICAL HISTORY    Past Surgical History:   Procedure Laterality Date    APPENDECTOMY      HERNIA REPAIR      HYSTERECTOMY         FAMILY HISTORY    Family History   Problem Relation Age of Onset    Cancer Father        SOCIAL HISTORY    Social History   Substance Use Topics    Smoking status: Never Smoker    Smokeless tobacco: Never Used    Alcohol use No       ALLERGIES    Allergies   Allergen Reactions    Sulfa Antibiotics Swelling       MEDICATIONS    Current Outpatient Prescriptions on File Prior to Encounter   Medication Sig Dispense Refill    naproxen (EC-NAPROSYN) 500 MG EC tablet Take 1 tablet by mouth every 12 hours as needed for Pain 20 tablet 3    losartan (COZAAR) 50 MG tablet Take 100 mg by mouth daily       celecoxib (CELEBREX) 100 MG capsule Take 200 mg by mouth 2 times daily       amLODIPine (NORVASC) 10 MG tablet Take 5 mg by mouth daily        No current facility-administered medications on file prior to encounter. REVIEW OF SYSTEMS    A comprehensive review of systems was negative.       Objective:      BP (!) 162/103   Pulse 88   Temp 98.2 °F (36.8 °C) (Oral)   Resp 16     PHYSICAL EXAM    General Appearance: alert and oriented to person, place and time, well developed and well- nourished, in no acute distress  Head: normocephalic

## 2017-11-06 ENCOUNTER — HOSPITAL ENCOUNTER (OUTPATIENT)
Dept: WOUND CARE | Age: 57
Discharge: HOME | End: 2017-11-06
Attending: SURGERY | Admitting: SURGERY

## 2017-11-06 VITALS — DIASTOLIC BLOOD PRESSURE: 93 MMHG | RESPIRATION RATE: 18 BRPM | SYSTOLIC BLOOD PRESSURE: 185 MMHG | TEMPERATURE: 98.5 F

## 2017-11-06 DIAGNOSIS — I83.029 VENOUS ULCER OF LEFT LEG (HCC): Primary | ICD-10-CM

## 2017-11-06 DIAGNOSIS — L97.929 VENOUS ULCER OF LEFT LEG (HCC): Primary | ICD-10-CM

## 2017-11-06 PROCEDURE — 11042 DBRDMT SUBQ TIS 1ST 20SQCM/<: CPT | Performed by: SURGERY

## 2017-11-06 RX ORDER — LIDOCAINE HYDROCHLORIDE 40 MG/ML
SOLUTION TOPICAL ONCE
Status: DISCONTINUED | OUTPATIENT
Start: 2017-11-06 | End: 2017-11-07 | Stop reason: HOSPADM

## 2017-11-06 NOTE — PROGRESS NOTES
erythema  Head: normocephalic and atraumatic  ENT: tympanic membrane, external ear and ear canal normal bilaterally, nose without deformity  Musculoskeletal: normal range of motion, no joint swelling, deformity or tenderness  Neurologic: reflexes normal and symmetric, no cranial nerve deficit, gait, coordination and speech normal      Assessment:     Patient Active Problem List   Diagnosis    Umbilical hernia    Chronic venous hypertension (idiopathic) with ulcer and inflammation of left lower extremity (HCC)    Pain and swelling of left lower extremity    Venous ulcer of left leg ([address]       Procedure Note    Performed by: Александр Webster MD    Consent obtained: Yes    Time out taken:  Yes    Pain Control: Anesthetic  Anesthetic: 4% Topical Xylocaine     Debridement:Excisional Debridement    Using curette the wound was sharply debrided    down through and including the removal of epidermis, dermis and subcutaneous tissue. Devitalized Tissue Debrided:  fibrin, biofilm and slough    Pre Debridement Measurements:  Are located in the Wound Documentation Flow Sheet    Wound #: 2     Post  Debridement Measurements:  Wound 04/24/17 Venous ulcer Leg Left;Lateral;Distal #2 (Active)   Wound Image   10/23/2017  9:55 AM   Wound Type Wound 11/6/2017  9:54 AM   Wound Venous 11/6/2017  9:54 AM   Dressing Status Clean;Dry; Intact 10/23/2017 10:22 AM   Dressing Changed Changed/New 10/23/2017 10:22 AM   Dressing/Treatment Alginate;Silver dressing 10/23/2017 10:22 AM   Wound Cleansed Rinsed/Irrigated with saline; Wound cleanser 11/6/2017  9:54 AM   Wound Length (cm) 0.6 cm 11/6/2017 10:04 AM   Wound Width (cm) 0.6 cm 11/6/2017 10:04 AM   Wound Depth (cm)  0.1 11/6/2017 10:04 AM   Calculated Wound Size (cm^2) (l*w) 0.36 cm^2 11/6/2017 10:04 AM   Change in Wound Size % (l*w) 93.79 11/6/2017 10:04 AM   Wound Assessment Granulation tissue 11/6/2017  9:54 AM   Flores-wound Assessment Pink 11/6/2017  9:54 AM   Pink%Wound Bed 0 11/6/2017  9:54 AM   Red%Wound Bed 100 11/6/2017  9:54 AM   Yellow%Wound Bed 0 11/6/2017  9:54 AM   Black%Wound Bed 0 11/6/2017  9:54 AM   Purple%Wound Bed 0 11/6/2017  9:54 AM   Other%Wound Bed 0 11/6/2017  9:54 AM   Drainage Amount Moderate 11/6/2017  9:54 AM   Drainage Description Green;Serosanguinous 11/6/2017  9:54 AM   Odor None 11/6/2017  9:54 AM   Time out Yes 11/6/2017 10:04 AM   Op First Treatment Date 04/24/17 4/24/2017 10:08 AM   Number of days: 196           Total Surface Area Debrided:  0.36 sq cm     Percentage of wound debrided 100%    Bleeding:  Minimal    Hemostasis Achieved:  not needed    Procedural Pain:  3  / 10     Post Procedural Pain:  0 / 10     Response to treatment:  Well tolerated by patient. Plan:     The nature of the patient's condition was explained in depth.  The patient was informed that their compliance to the treatment plan is paramount to successful healing and prevention of further ulceration and/or infection     Discharge Treatment  Mepitel and in a wrap to left calf and follow-up in one week    Written Patient Discharge Instructions Given            Electronically signed by Jaylon Bach MD on 11/6/2017 at 10:09 AM

## 2017-11-13 ENCOUNTER — HOSPITAL ENCOUNTER (OUTPATIENT)
Dept: WOUND CARE | Age: 57
Discharge: HOME | End: 2017-11-13
Attending: SURGERY | Admitting: SURGERY

## 2017-11-13 VITALS — DIASTOLIC BLOOD PRESSURE: 98 MMHG | RESPIRATION RATE: 18 BRPM | HEART RATE: 81 BPM | SYSTOLIC BLOOD PRESSURE: 205 MMHG

## 2017-11-13 DIAGNOSIS — I87.332 CHRONIC VENOUS HYPERTENSION (IDIOPATHIC) WITH ULCER AND INFLAMMATION OF LEFT LOWER EXTREMITY (HCC): Primary | ICD-10-CM

## 2017-11-13 DIAGNOSIS — L97.929 CHRONIC VENOUS HYPERTENSION (IDIOPATHIC) WITH ULCER AND INFLAMMATION OF LEFT LOWER EXTREMITY (HCC): Primary | ICD-10-CM

## 2017-11-13 PROCEDURE — 99212 OFFICE O/P EST SF 10 MIN: CPT | Performed by: SURGERY

## 2017-11-13 RX ORDER — LIDOCAINE HYDROCHLORIDE 40 MG/ML
SOLUTION TOPICAL ONCE
Status: DISCONTINUED | OUTPATIENT
Start: 2017-11-13 | End: 2017-11-14 | Stop reason: HOSPADM

## 2017-11-13 NOTE — PLAN OF CARE
Problem: Wound:  Goal: Will show signs of wound healing; wound closure and no evidence of infection  Will show signs of wound healing; wound closure and no evidence of infection   Outcome: Ongoing  Discharge instructions given. Patient verbalized understanding. Return to [facility] in 1 week.        [] antibiotics    [] X-ray     [] Culture   [x] Debridement      [] HBO Evaluation    [] LABS   [] Vascular Studies []

## 2017-11-13 NOTE — PROGRESS NOTES
[facility]  Progress Note       Salbadorjhonatan Laureano  AGE: 56 y.o. GENDER: female  : 1960  TODAY'S DATE:  2017    Subjective:     Chief Complaint   Patient presents with    Wound Check     Left leg         HISTORY of PRESENT ILLNESS HPI     Vinh Yao is a 56 y.o. female who presents today for wound evaluation. History of Wound: Chronic venous stasis ulceration left calf now healed    Wound Pain:  none  Severity:  0 / 10   Wound Type:  venous  Modifying Factors:  venous stasis  Associated Signs/Symptoms:  none        PAST MEDICAL HISTORY        Diagnosis Date    Arthritis     Hypertension     Movement disorder        PAST SURGICAL HISTORY    Past Surgical History:   Procedure Laterality Date    APPENDECTOMY      HERNIA REPAIR      HYSTERECTOMY         FAMILY HISTORY    Family History   Problem Relation Age of Onset    Cancer Father        SOCIAL HISTORY    Social History   Substance Use Topics    Smoking status: Never Smoker    Smokeless tobacco: Never Used    Alcohol use No       ALLERGIES    Allergies   Allergen Reactions    Sulfa Antibiotics Swelling       MEDICATIONS    Current Outpatient Prescriptions on File Prior to Encounter   Medication Sig Dispense Refill    naproxen (EC-NAPROSYN) 500 MG EC tablet Take 1 tablet by mouth every 12 hours as needed for Pain 20 tablet 3    losartan (COZAAR) 50 MG tablet Take 100 mg by mouth daily       celecoxib (CELEBREX) 100 MG capsule Take 200 mg by mouth 2 times daily       amLODIPine (NORVASC) 10 MG tablet Take 5 mg by mouth daily        No current facility-administered medications on file prior to encounter. REVIEW OF SYSTEMS    A comprehensive review of systems was negative.       Objective:      BP (!) 205/98   Pulse 81   Resp 18     PHYSICAL EXAM    General Appearance: alert and oriented to person, place and time, well developed and well- nourished, in no acute distress  Skin: warm and dry, stasis dermatitis

## 2017-11-14 ENCOUNTER — OFFICE VISIT (OUTPATIENT)
Dept: CARDIOLOGY CLINIC | Age: 57
End: 2017-11-14

## 2017-11-14 VITALS
WEIGHT: 293 LBS | OXYGEN SATURATION: 94 % | HEART RATE: 72 BPM | DIASTOLIC BLOOD PRESSURE: 86 MMHG | SYSTOLIC BLOOD PRESSURE: 160 MMHG | HEIGHT: 66 IN | BODY MASS INDEX: 47.09 KG/M2

## 2017-11-14 DIAGNOSIS — L97.929 CHRONIC VENOUS HYPERTENSION (IDIOPATHIC) WITH ULCER AND INFLAMMATION OF LEFT LOWER EXTREMITY (HCC): Primary | ICD-10-CM

## 2017-11-14 DIAGNOSIS — R00.2 POUNDING HEARTBEAT: ICD-10-CM

## 2017-11-14 DIAGNOSIS — I87.332 CHRONIC VENOUS HYPERTENSION (IDIOPATHIC) WITH ULCER AND INFLAMMATION OF LEFT LOWER EXTREMITY (HCC): Primary | ICD-10-CM

## 2017-11-14 PROCEDURE — 93000 ELECTROCARDIOGRAM COMPLETE: CPT | Performed by: INTERNAL MEDICINE

## 2017-11-14 PROCEDURE — 99204 OFFICE O/P NEW MOD 45 MIN: CPT | Performed by: INTERNAL MEDICINE

## 2017-11-14 RX ORDER — AMLODIPINE BESYLATE 5 MG/1
TABLET ORAL
Refills: 5 | COMMUNITY
Start: 2017-10-31

## 2017-11-14 RX ORDER — LOSARTAN POTASSIUM 100 MG/1
TABLET ORAL
Refills: 6 | COMMUNITY
Start: 2017-11-01

## 2017-11-14 RX ORDER — CELECOXIB 200 MG/1
200 CAPSULE ORAL
COMMUNITY

## 2017-11-14 RX ORDER — ASPIRIN 81 MG/1
81 TABLET, CHEWABLE ORAL DAILY
COMMUNITY

## 2017-11-14 RX ORDER — IBUPROFEN 200 MG
200 TABLET ORAL EVERY 6 HOURS PRN
COMMUNITY

## 2017-11-14 NOTE — COMMUNICATION BODY
2017    PATIENT: Kg Avelar  : 1960    Primary Care Provider:   Fabio Feldman MD  I:[phone]  Q:[phone]      Reason for evaluation:   Chief Complaint   Patient presents with   Vincent Nose New Doctor    Follow-up     Stress test    Shortness of Breath     KEITH    Hypertension    Cardiac Clearance     bariatric in May       History of present illness:   Ms. Kg Avelar is a 56 y.o. female patient , not previously seen at [facility], Here for further evaluation of an equivocal stress test in 2017. She says her primary care provider  ordered this at the time for new onset palpitations and nondescript epigastric/ chest symptoms. She says that they were initially felt to be indigestion but had grown concerned because of her mother's history of a heart attack and her 76s. She also says that her brother who is 8 months younger had a heart attack this year. Prior to August she had no previous cardiac diagnoses or workup and was on 2 antihypertensives. She says that despite her family history of diabetes and her morbid obesity she has not formally been diagnosed with such. Her primary care have her start daily aspirin after the stress test results and given that she is now pursuing gastric bypass surgery workup, is referred for eventual cardiac clearance. Medical History:      Diagnosis Date    Arthritis     Hypertension     Movement disorder        Surgical History:      Procedure Laterality Date    APPENDECTOMY      HERNIA REPAIR      HYSTERECTOMY         Social History:  Social History     Social History    Marital status: Single     Spouse name: N/A    Number of children: N/A    Years of education: N/A     Occupational History    Not on file.      Social History Main Topics    Smoking status: Never Smoker    Smokeless tobacco: Never Used    Alcohol use No    Drug use: No    Sexual activity: Not on file     Other Topics Concern    Not on file     Social History Narrative    No narrative on file        Family History:  No evidence for sudden cardiac death or premature CAD. Problem Relation Age of Onset    Cancer Father     Heart Disease Mother     High Blood Pressure Mother        Medications:  [x] Medications and dosages reviewed. Prior to Admission medications    Medication Sig Start Date End Date Taking?  Authorizing Provider   amLODIPine (NORVASC) 5 MG tablet TK 1 T PO  QD 10/31/17  Yes Historical Provider, MD   losartan (COZAAR) 100 MG tablet TK 1 T PO QD 11/1/17  Yes Historical Provider, MD   celecoxib (CELEBREX) 200 MG capsule Take 200 mg by mouth   Yes Historical Provider, MD   aspirin 81 MG chewable tablet Take 81 mg by mouth daily   Yes Historical Provider, MD   ibuprofen (ADVIL;MOTRIN) 200 MG tablet Take 200 mg by mouth every 6 hours as needed for Pain   Yes Historical Provider, MD   naproxen (EC-NAPROSYN) 500 MG EC tablet Take 1 tablet by mouth every 12 hours as needed for Pain 6/20/17 6/20/18  Nathaly Davey MD       Allergies:  Sulfa antibiotics     Review of Systems:    [x]Full ROS obtained and negative except as mentioned in HPI    Physical Examination:    BP (!) 160/86 (Site: Left Arm, Position: Sitting, Cuff Size: Large Adult)   Pulse 72   Ht 5' 6\" (1.676 m)   Wt (!) 371 lb (168.3 kg)   SpO2 94%   BMI 59.88 kg/m²    Wt Readings from Last 3 Encounters:   11/14/17 (!) 371 lb (168.3 kg)   08/07/17 (!) 376 lb (170.6 kg)   06/20/17 (!) 374 lb 9 oz (169.9 kg)       · GENERAL: Well developed, well nourished, no acute distress; morbidly obese  · NEUROLOGICAL: Alert and oriented x3  · PSYCH: Normal mood and affect   · SKIN: Warm and dry  · HEENT: Normocephalic, atraumatic, Sclera non-icteric, mucous membranes moist  · NECK: supple, JVP normal  · CAROTID: Normal upstroke, no bruits  · CARDIAC: Normal PMI, regular rate and rhythm, normal S1S2, no murmur, rub, or HR: 60    Test duration: 1 min and 40 sec    Reason for termination:Physiologic Maximum        ECG Findings    No ischemic EKG changes.    Nondiagnostic due to failure to reach target.        Arrhythmias    Rare premature atrial contractions.        Symptoms    Lexiscan 0.4 mg IV given followed by shortness of breath, mild    lightheadedness, and mild headache. No chest discomfort. O2 saturation    95% on room air. Mild headache only in recovery.        Complications    Procedure complication was none.        Stress Interpretation    2-day nuclear protocol.    Non-diagnostic EKG response due to failure to reach target heart rate.       Procedure Medications     - Lexiscan I.V. 0.4 mg.10 sec        Imaging Protocols        - Two Day        - One Day        Rest                       Stress        Isotope:Myoview            Isotope: Myoview    Isotope dose:32.1 mCi      Isotope dose:32.8 mCi    Administration Route:I.V.  Administration Route:I.V.    Date:08/11/2017 00:00      Date:08/10/2017 00:00                                   Technique:     Gated       Perfusion Images       Rest and Stress:   Segments: 1 -Normal 2 -Fixed 3 -Reversible 4 -Partialy reversible 5 -Scar 6   -Defect   +-----------------+----------+--------+----+---------+---------+   ! Segment          ! Perfusion ! Severity! Wall! Artifact ! Artreason! +-----------------+----------+--------+----+---------+---------+   ! BASAL - Anterior ! Fixed     !        !    !         !         !   +-----------------+----------+--------+----+---------+---------+   ! MID - Anterior   ! Fixed     !        !    !         !         !   +-----------------+----------+--------+----+---------+---------+   ! MID - Inferior   ! Reversible!        !    !         !         !   +-----------------+----------+--------+----+---------+---------+   ! APICAL - Anterior! Fixed     !        !    !         !         !   +-----------------+----------+--------+----+---------+---------+     Imaging Results          Study artifacts          1) Breast attenuation          2) Diaphragmatic      attenuation          Summed scores          - Summed stress score: 15          - Summed rest score: 14          - Summed difference score:      5          Stress ejection      Ejection fraction:66 %      EDV :151 ml      ESV :51 ml      Stroke volume :100 ml       Normal LV function         Impression/Recommendations    Ms. Moshe Munoz is a 56 y.o. female patient of Dr. Enid Flores on [address]. :     1. Chronic venous hypertension (idiopathic) with ulcer and inflammation of left lower extremity (HCC)    2. Pounding heartbeat        Hx. Equivocal Nuclear pharmacologic stress testing August 2017 as detailed above in the setting of palpitations and atypical chest pain she had described as indigestion   -I personally reviewed Mery's nuclear images with her so that she could understand the concern of breast attenuation defect versus any reversible myocardial perfusion defect that could perhaps be in the anterior wall. She understands that even though I am concerned about her symptoms at the time, I am more concerned about her elevated cardiovascular risk for likely upcoming gastric bypass surgery. We did discuss her options of a dobutamine stress echocardiogram versus cardiac catheterization versus coronary CTA. At this time, given no previous echocardiographic evaluation, we agreed that if good image acquisition is possible a dobutamine stress echocardiogram would be a good test to evaluate for any regionality and have more confidence in providing cardiac clearance. Morbid obesity with Pre-diabetes , seeing Bariatric surgeon   Hypertension controlled on CCB/ARB  Hx. Venous stasis with ulcers and S/P Endovenous ablation with Dr. Christiano Salcido - with left Amador Emelis boot in place    Orders Placed This Encounter   Procedures    EKG 12 Lead     Order Specific Question:   Reason for Exam?     Answer:    Other Comments:   baseline     Return if symptoms worsen or fail to improve. Patient Instructions   Dobutamine stress soon preop testing. We will call results. Thank you for allowing me to participate in the care of your patient. Please do not hesitate to call.      Doug Shah D.O., [facility] [place]  Interventional Cardiology     o: [phone]  [address] [place] [address]., [address], [address] [place] [address]

## 2017-11-14 NOTE — PROGRESS NOTES
2017    PATIENT: Kamlesh Vasquez  : 1960    Primary Care Provider:   Kalia Simmons MD  N:[phone]  L:[phone]      Reason for evaluation:   Chief Complaint   Patient presents with   Cheryal Gear New Doctor    Follow-up     Stress test    Shortness of Breath     KEITH    Hypertension    Cardiac Clearance     bariatric in May       History of present illness:   Ms. Kamlesh Vasquez is a 56 y.o. female patient , not previously seen at [place] [address], Here for further evaluation of an equivocal stress test in 2017. She says her primary care provider  ordered this at the time for new onset palpitations and nondescript epigastric/ chest symptoms. She says that they were initially felt to be indigestion but had grown concerned because of her mother's history of a heart attack and her 76s. She also says that her brother who is 8 months younger had a heart attack this year. Prior to August she had no previous cardiac diagnoses or workup and was on 2 antihypertensives. She says that despite her family history of diabetes and her morbid obesity she has not formally been diagnosed with such. Her primary care have her start daily aspirin after the stress test results and given that she is now pursuing gastric bypass surgery workup, is referred for eventual cardiac clearance. Medical History:      Diagnosis Date    Arthritis     Hypertension     Movement disorder        Surgical History:      Procedure Laterality Date    APPENDECTOMY      HERNIA REPAIR      HYSTERECTOMY         Social History:  Social History     Social History    Marital status: Single     Spouse name: N/A    Number of children: N/A    Years of education: N/A     Occupational History    Not on file.      Social History Main Topics    Smoking status: Never Smoker    Smokeless tobacco: Never Used    Alcohol use No    Drug use: No    Sexual activity: Not on file     Other Topics Concern    Not on file     Social History Narrative    No narrative on file        Family History:  No evidence for sudden cardiac death or premature CAD. Problem Relation Age of Onset    Cancer Father     Heart Disease Mother     High Blood Pressure Mother        Medications:  [x] Medications and dosages reviewed. Prior to Admission medications    Medication Sig Start Date End Date Taking?  Authorizing Provider   amLODIPine (NORVASC) 5 MG tablet TK 1 T PO  QD 10/31/17  Yes Historical Provider, MD   losartan (COZAAR) 100 MG tablet TK 1 T PO QD 11/1/17  Yes Historical Provider, MD   celecoxib (CELEBREX) 200 MG capsule Take 200 mg by mouth   Yes Historical Provider, MD   aspirin 81 MG chewable tablet Take 81 mg by mouth daily   Yes Historical Provider, MD   ibuprofen (ADVIL;MOTRIN) 200 MG tablet Take 200 mg by mouth every 6 hours as needed for Pain   Yes Historical Provider, MD   naproxen (EC-NAPROSYN) 500 MG EC tablet Take 1 tablet by mouth every 12 hours as needed for Pain 6/20/17 6/20/18  Sriram Green MD       Allergies:  Sulfa antibiotics     Review of Systems:    [x]Full ROS obtained and negative except as mentioned in HPI    Physical Examination:    BP (!) 160/86 (Site: Left Arm, Position: Sitting, Cuff Size: Large Adult)   Pulse 72   Ht 5' 6\" (1.676 m)   Wt (!) 371 lb (168.3 kg)   SpO2 94%   BMI 59.88 kg/m²   Wt Readings from Last 3 Encounters:   11/14/17 (!) 371 lb (168.3 kg)   08/07/17 (!) 376 lb (170.6 kg)   06/20/17 (!) 374 lb 9 oz (169.9 kg)       · GENERAL: Well developed, well nourished, no acute distress; morbidly obese  · NEUROLOGICAL: Alert and oriented x3  · PSYCH: Normal mood and affect   · SKIN: Warm and dry  · HEENT: Normocephalic, atraumatic, Sclera non-icteric, mucous membranes moist  · NECK: supple, JVP normal  · CAROTID: Normal upstroke, no bruits  · CARDIAC: Normal PMI, regular rate and rhythm, normal S1S2, no murmur, rub, or gallop  · RESPIRATORY: Normal respiratory effort, clear to auscultation bilaterally  · EXTREMITIES: Bilateral edema +1 with a Unna boot on the left, +2 pulses bilaterally   · MUSCULOSKELETAL: No joint swelling or tenderness, no chest wall tenderness  · GASTROINTESTINAL: normal bowel sounds, soft, non-tender, no bruit    Labs:  Lab Review   No visits with results within 2 Month(s) from this visit. Latest known visit with results is:   Orders Only on 05/28/2013   Component Date Value    Pathology Report 05/29/2013 See report below          Imaging:  I have reviewed the below testing personally:    EKG:   Today is normal sinus rhythm at 76 bpm, within normal limits    Vascular 11/8/16  Conclusions        Summary        No evidence of arterial insufficiency seen on this study. STRESS TEST:   8/9/17  Nuclear @ [address] [place] [address] study.    Suboptimal image quality likely related to body habitus.   Bonnita Glee is a small sized, mild intensity, minimally reversible mid inferior    wall defect. Cannot exclude ischemia as there is likely diaphragmatic    attenuation artifact.    There is a large sized, moderate intensity, fixed anterior wall defect which    is most consistent with breast attenuation artifact.    ECG portion of stress test is non-diagnostic as the patient did not    exercise/target heart rate not achieved.    Normal LV size and systolic function.       Stress Protocols        Resting ECG    Normal sinus rhythm.    Early repolarization.        Resting HR:78 bpm  Resting BP:126/78 mmHg        Pre-stress physical exam: No complaints    of discomfort. Heart murmur heard, lungs    sound clear. O2 saturation 91% on room    air.  Limited mobility; to proceed with    Lexiscan Myoview stress test.       Stress Protocol:Pharmacologic - Lexiscan's        Peak HR:100 bpm                                 HR/BP product:[id number]    Peak BP:131/72 mmHg    Predicted HR: 166 bpm    % of predicted HR: 60    Test duration: 1 min and 40 sec    Reason for termination:Physiologic Maximum        ECG Findings    No ischemic EKG changes.    Nondiagnostic due to failure to reach target.        Arrhythmias    Rare premature atrial contractions.        Symptoms    Lexiscan 0.4 mg IV given followed by shortness of breath, mild    lightheadedness, and mild headache. No chest discomfort. O2 saturation    95% on room air. Mild headache only in recovery.        Complications    Procedure complication was none.        Stress Interpretation    2-day nuclear protocol.    Non-diagnostic EKG response due to failure to reach target heart rate.       Procedure Medications     - Lexiscan I.V. 0.4 mg.10 sec        Imaging Protocols        - Two Day        - One Day        Rest                       Stress        Isotope:Myoview            Isotope: Myoview    Isotope dose:32.1 mCi      Isotope dose:32.8 mCi    Administration Route:I.V.  Administration Route:I.V.    Date:08/11/2017 00:00      Date:08/10/2017 00:00                                   Technique:     Gated       Perfusion Images       Rest and Stress:   Segments: 1 -Normal 2 -Fixed 3 -Reversible 4 -Partialy reversible 5 -Scar 6   -Defect   +-----------------+----------+--------+----+---------+---------+   ! Segment          ! Perfusion ! Severity! Wall! Artifact ! Artreason! +-----------------+----------+--------+----+---------+---------+   ! BASAL - Anterior ! Fixed     !        !    !         !         !   +-----------------+----------+--------+----+---------+---------+   ! MID - Anterior   ! Fixed     !        !    !         !         !   +-----------------+----------+--------+----+---------+---------+   ! MID - Inferior   ! Reversible!        !    !         !         !   +-----------------+----------+--------+----+---------+---------+   ! APICAL - Anterior! Fixed     !        !    !         !         !   +-----------------+----------+--------+----+---------+---------+     Imaging Results          Study artifacts          1) Breast attenuation          2) Diaphragmatic      attenuation          Summed scores          - Summed stress score: 15          - Summed rest score: 14          - Summed difference score:      5          Stress ejection      Ejection fraction:66 %      EDV :151 ml      ESV :51 ml      Stroke volume :100 ml       Normal LV function         Impression/Recommendations    Ms. Cecelia Irizarry is a 56 y.o. female patient of Dr. Alta Ho on [address]. :     1. Chronic venous hypertension (idiopathic) with ulcer and inflammation of left lower extremity (HCC)    2. Pounding heartbeat        Hx. Equivocal Nuclear pharmacologic stress testing August 2017 as detailed above in the setting of palpitations and atypical chest pain she had described as indigestion   -I personally reviewed Mery's nuclear images with her so that she could understand the concern of breast attenuation defect versus any reversible myocardial perfusion defect that could perhaps be in the anterior wall. She understands that even though I am concerned about her symptoms at the time, I am more concerned about her elevated cardiovascular risk for likely upcoming gastric bypass surgery. We did discuss her options of a dobutamine stress echocardiogram versus cardiac catheterization versus coronary CTA. At this time, given no previous echocardiographic evaluation, we agreed that if good image acquisition is possible a dobutamine stress echocardiogram would be a good test to evaluate for any regionality and have more confidence in providing cardiac clearance. Morbid obesity with Pre-diabetes , seeing Bariatric surgeon   Hypertension controlled on CCB/ARB  Hx. Venous stasis with ulcers and S/P Endovenous ablation with Dr. Barney Thakkar - with left Lance Flaquita boot in place    Orders Placed This Encounter   Procedures    EKG 12 Lead     Order Specific Question:   Reason for Exam?     Answer:    Other Comments:   baseline     Return if symptoms worsen or fail to improve. Patient Instructions   Dobutamine stress soon preop testing. We will call results. Thank you for allowing me to participate in the care of your patient. Please do not hesitate to call.      Guillermo Cristobal D.O., [facility] [place]  Interventional Cardiology     o: [phone]  [address] [place] [address]., [address], [address] [place] [address]

## 2017-11-14 NOTE — LETTER
[address] [address] [address] [address]  Phone: [phone]  Fax: [phone],         2017     Marilyn Herman [id number]    Patient: Robert Tello  MR Number: [id number]  YOB: 1960  Date of Visit: 2017    PATIENT: Robert Tello  : 1960    Primary Care Provider:   Marlee Land MD  [phone]      Reason for evaluation:   Chief Complaint   Patient presents with   Leonela Strong New Doctor    Follow-up     Stress test    Shortness of Breath     KEITH    Hypertension    Cardiac Clearance     bariatric in May       History of present illness:   Ms. Robert Tello is a 56 y.o. female patient , not previously seen at [place] [address], Here for further evaluation of an equivocal stress test in 2017. She says her primary care provider  ordered this at the time for new onset palpitations and nondescript epigastric/ chest symptoms. She says that they were initially felt to be indigestion but had grown concerned because of her mother's history of a heart attack and her 76s. She also says that her brother who is 8 months younger had a heart attack this year. Prior to August she had no previous cardiac diagnoses or workup and was on 2 antihypertensives. She says that despite her family history of diabetes and her morbid obesity she has not formally been diagnosed with such. Her primary care have her start daily aspirin after the stress test results and given that she is now pursuing gastric bypass surgery workup, is referred for eventual cardiac clearance.         Medical History:      Diagnosis Date    Arthritis     Hypertension     Movement disorder        Surgical History:      Procedure Laterality Date    APPENDECTOMY      HERNIA REPAIR  HYSTERECTOMY         Social History:  Social History     Social History    Marital status: Single     Spouse name: N/A    Number of children: N/A    Years of education: N/A     Occupational History    Not on file. Social History Main Topics    Smoking status: Never Smoker    Smokeless tobacco: Never Used    Alcohol use No    Drug use: No    Sexual activity: Not on file     Other Topics Concern    Not on file     Social History Narrative    No narrative on file        Family History:  No evidence for sudden cardiac death or premature CAD. Problem Relation Age of Onset    Cancer Father     Heart Disease Mother     High Blood Pressure Mother        Medications:   Medications and dosages reviewed. Prior to Admission medications    Medication Sig Start Date End Date Taking?  Authorizing Provider   amLODIPine (NORVASC) 5 MG tablet TK 1 T PO  QD 10/31/17  Yes Historical Provider, MD   losartan (COZAAR) 100 MG tablet TK 1 T PO QD 11/1/17  Yes Historical Provider, MD   celecoxib (CELEBREX) 200 MG capsule Take 200 mg by mouth   Yes Historical Provider, MD   aspirin 81 MG chewable tablet Take 81 mg by mouth daily   Yes Historical Provider, MD   ibuprofen (ADVIL;MOTRIN) 200 MG tablet Take 200 mg by mouth every 6 hours as needed for Pain   Yes Historical Provider, MD   naproxen (EC-NAPROSYN) 500 MG EC tablet Take 1 tablet by mouth every 12 hours as needed for Pain 6/20/17 6/20/18  Juan C Magaña MD       Allergies:  Sulfa antibiotics     Review of Systems:    Full ROS obtained and negative except as mentioned in HPI    Physical Examination:    BP (!) 160/86 (Site: Left Arm, Position: Sitting, Cuff Size: Large Adult)   Pulse 72   Ht 5' 6\" (1.676 m)   Wt (!) 371 lb (168.3 kg)   SpO2 94%   BMI 59.88 kg/m²    Wt Readings from Last 3 Encounters:   11/14/17 (!) 371 lb (168.3 kg)   08/07/17 (!) 376 lb (170.6 kg)   06/20/17 (!) 374 lb 9 oz (169.9 kg) · GENERAL: Well developed, well nourished, no acute distress; morbidly obese  · NEUROLOGICAL: Alert and oriented x3  · PSYCH: Normal mood and affect   · SKIN: Warm and dry  · HEENT: Normocephalic, atraumatic, Sclera non-icteric, mucous membranes moist  · NECK: supple, JVP normal  · CAROTID: Normal upstroke, no bruits  · CARDIAC: Normal PMI, regular rate and rhythm, normal S1S2, no murmur, rub, or gallop  · RESPIRATORY: Normal respiratory effort, clear to auscultation bilaterally  · EXTREMITIES: Bilateral edema +1 with a Unna boot on the left, +2 pulses bilaterally   · MUSCULOSKELETAL: No joint swelling or tenderness, no chest wall tenderness  · GASTROINTESTINAL: normal bowel sounds, soft, non-tender, no bruit    Labs:  Lab Review   No visits with results within 2 Month(s) from this visit. Latest known visit with results is:   Orders Only on 05/28/2013   Component Date Value    Pathology Report 05/29/2013 See report below          Imaging:  I have reviewed the below testing personally:    EKG:   Today is normal sinus rhythm at 76 bpm, within normal limits    Vascular 11/8/16  Conclusions        Summary        No evidence of arterial insufficiency seen on this study. STRESS TEST:   8/9/17  Nuclear @ [address] [place] [address] study.    Suboptimal image quality likely related to body habitus.   Bonnita Glee is a small sized, mild intensity, minimally reversible mid inferior    wall defect.  Cannot exclude ischemia as there is likely diaphragmatic    attenuation artifact.    There is a large sized, moderate intensity, fixed anterior wall defect which    is most consistent with breast attenuation artifact.    ECG portion of stress test is non-diagnostic as the patient did not    exercise/target heart rate not achieved.    Normal LV size and systolic function.       Stress Protocols        Resting ECG    Normal sinus rhythm.    Early repolarization.     dobutamine stress echocardiogram would be a good test to evaluate for any regionality and have more confidence in providing cardiac clearance. Morbid obesity with Pre-diabetes , seeing Bariatric surgeon   Hypertension controlled on CCB/ARB  Hx. Venous stasis with ulcers and S/P Endovenous ablation with Dr. Bina Corado - with left Areta Landon boot in place    Orders Placed This Encounter   Procedures    EKG 12 Lead     Order Specific Question:   Reason for Exam?     Answer: Other     Comments:   baseline     Return if symptoms worsen or fail to improve. Patient Instructions   Dobutamine stress soon preop testing. We will call results. Thank you for allowing me to participate in the care of your patient. Please do not hesitate to call. Zari Bain D.O., [facility]  Interventional Cardiology     o: [phone]  [address] [place] [address]., [address], [address]           If you have questions, please do not hesitate to call me. I look forward to following Nixon Castillo along with you.     Sincerely,        Karla Aguayo, DO

## 2017-11-20 ENCOUNTER — HOSPITAL ENCOUNTER (OUTPATIENT)
Dept: WOUND CARE | Age: 57
Discharge: HOME | End: 2017-11-20
Attending: SURGERY | Admitting: SURGERY

## 2017-11-20 VITALS
SYSTOLIC BLOOD PRESSURE: 157 MMHG | RESPIRATION RATE: 18 BRPM | DIASTOLIC BLOOD PRESSURE: 91 MMHG | TEMPERATURE: 99.3 F | HEART RATE: 87 BPM

## 2017-11-20 DIAGNOSIS — I83.029 VENOUS ULCER OF LEFT LEG (HCC): Primary | ICD-10-CM

## 2017-11-20 DIAGNOSIS — L97.929 VENOUS ULCER OF LEFT LEG (HCC): Primary | ICD-10-CM

## 2017-11-20 PROCEDURE — 11042 DBRDMT SUBQ TIS 1ST 20SQCM/<: CPT | Performed by: SURGERY

## 2017-11-20 RX ORDER — LIDOCAINE HYDROCHLORIDE 40 MG/ML
SOLUTION TOPICAL ONCE
Status: DISCONTINUED | OUTPATIENT
Start: 2017-11-20 | End: 2017-11-21 | Stop reason: HOSPADM

## 2017-11-20 RX ORDER — DOXYCYCLINE HYCLATE 100 MG
100 TABLET ORAL 2 TIMES DAILY
Qty: 20 TABLET | Refills: 0 | Status: SHIPPED | OUTPATIENT
Start: 2017-11-20 | End: 2017-11-30

## 2017-11-20 NOTE — PROGRESS NOTES
[facility]  Progress Note and Procedure Note      Brody Samayoa  AGE: 56 y.o. GENDER: female  : 1960  TODAY'S DATE:  2017    Subjective:     Chief Complaint   Patient presents with    Wound Check     left lower leg         HISTORY of PRESENT ILLNESS HPI     Brody Samayoa is a 56 y.o. female who presents today for wound evaluation. History of Wound: Left calf ulcer  Wound Pain:  moderate  Severity:  4 / 10   Wound Type:  venous  Modifying Factors:  venous stasis  Associated Signs/Symptoms:  none        PAST MEDICAL HISTORY        Diagnosis Date    Arthritis     Hypertension     Movement disorder        PAST SURGICAL HISTORY    Past Surgical History:   Procedure Laterality Date    APPENDECTOMY      HERNIA REPAIR      HYSTERECTOMY         FAMILY HISTORY    Family History   Problem Relation Age of Onset    Cancer Father     Heart Disease Mother     High Blood Pressure Mother        SOCIAL HISTORY    Social History   Substance Use Topics    Smoking status: Never Smoker    Smokeless tobacco: Never Used    Alcohol use No       ALLERGIES    Allergies   Allergen Reactions    Sulfa Antibiotics Swelling and Itching       MEDICATIONS    Current Outpatient Prescriptions on File Prior to Encounter   Medication Sig Dispense Refill    amLODIPine (NORVASC) 5 MG tablet TK 1 T PO  QD  5    losartan (COZAAR) 100 MG tablet TK 1 T PO QD  6    celecoxib (CELEBREX) 200 MG capsule Take 200 mg by mouth      aspirin 81 MG chewable tablet Take 81 mg by mouth daily      ibuprofen (ADVIL;MOTRIN) 200 MG tablet Take 200 mg by mouth every 6 hours as needed for Pain      naproxen (EC-NAPROSYN) 500 MG EC tablet Take 1 tablet by mouth every 12 hours as needed for Pain 20 tablet 3     No current facility-administered medications on file prior to encounter. REVIEW OF SYSTEMS    A comprehensive review of systems was negative.       Objective:      BP (!) 157/91   Pulse 87   Temp 99.3 °F (37.4 °C) (Oral)   Resp 18     PHYSICAL EXAM    General Appearance: alert and oriented to person, place and time, well developed and well- nourished, in no acute distress  Skin: warm and dry, no rash or erythema  Head: normocephalic and atraumatic  Eyes: pupils equal, round, and reactive to light, extraocular eye movements intact, conjunctivae normal  Neck: supple and non-tender without mass, no thyromegaly or thyroid nodules, no cervical lymphadenopathy  Musculoskeletal: normal range of motion, no joint swelling, deformity or tenderness  Neurologic: reflexes normal and symmetric, no cranial nerve deficit, gait, coordination and speech normal      Assessment:     Patient Active Problem List   Diagnosis    Umbilical hernia    Chronic venous hypertension (idiopathic) with ulcer and inflammation of left lower extremity (HCC)    Pain and swelling of left lower extremity    Venous ulcer of left leg ([address]       Procedure Note    Performed by: Nat Winters MD    Consent obtained: Yes    Time out taken:  Yes    Pain Control: Anesthetic  Anesthetic: 4% Topical Xylocaine     Debridement:Excisional Debridement    Using curette the wound was sharply debrided    down through and including the removal of epidermis, dermis and subcutaneous tissue. Devitalized Tissue Debrided:  fibrin, biofilm and slough    Pre Debridement Measurements:  Are located in the Wound Documentation Flow Sheet    Wound #: 2     Post  Debridement Measurements:  Wound 04/24/17 Venous ulcer Leg Left;Lateral;Distal #2 (Active)   Wound Image   11/13/2017  9:56 AM   Wound Type Wound 11/20/2017 10:04 AM   Wound Venous 11/20/2017 10:04 AM   Dressing Status Clean;Dry; Intact 10/23/2017 10:22 AM   Dressing Changed Changed/New 10/23/2017 10:22 AM   Dressing/Treatment Alginate;Silver dressing 10/23/2017 10:22 AM   Wound Cleansed Rinsed/Irrigated with saline 11/20/2017 10:04 AM   Wound Length (cm) 0.4 cm 11/20/2017 10:20 AM   Wound Width (cm) 0.7 cm 11/20/2017 10:20 AM   Wound Depth (cm)  0.1 11/20/2017 10:20 AM   Calculated Wound Size (cm^2) (l*w) 0.28 cm^2 11/20/2017 10:20 AM   Change in Wound Size % (l*w) 95.17 11/20/2017 10:20 AM   Wound Assessment Granulation tissue 11/20/2017 10:04 AM   Drainage Amount Moderate 11/20/2017 10:20 AM   Drainage Description Serosanguinous 11/20/2017 10:04 AM   Odor None 11/20/2017 10:04 AM   Flores-wound Assessment Pink 11/20/2017 10:04 AM   [place]%Wound Bed 0 11/6/2017  9:54 AM   Red%Wound Bed 0 11/20/2017 10:04 AM   Yellow%Wound Bed 100 11/20/2017 10:04 AM   Black%Wound Bed 0 11/20/2017 10:04 AM   Purple%Wound Bed 0 11/20/2017 10:04 AM   Other%Wound Bed 0 11/20/2017 10:04 AM   Time out Yes 11/20/2017 10:04 AM   Op First Treatment Date 04/24/17 4/24/2017 10:08 AM   Number of days: 210           Total Surface Area Debrided:  0.28 sq cm     Percentage of wound debrided 100%    Bleeding:  Minimal    Hemostasis Achieved:  not needed    Procedural Pain:  3  / 10     Post Procedural Pain:  0 / 10     Response to treatment:  Well tolerated by patient. Plan:     The nature of the patient's condition was explained in depth.  The patient was informed that their compliance to the treatment plan is paramount to successful healing and prevention of further ulceration and/or infection     Discharge Treatment  Hydrofera Blue and Unna wrap, patient given doxycycline 100 mg twice daily for 10 days for periwound cellulitis, follow-up in one week    Written Patient Discharge Instructions Given            Electronically signed by Drew Hendrix MD on 11/20/2017 at 10:25 AM

## 2017-11-27 ENCOUNTER — HOSPITAL ENCOUNTER (OUTPATIENT)
Dept: WOUND CARE | Age: 57
Discharge: HOME | End: 2017-11-27
Attending: SURGERY | Admitting: SURGERY

## 2017-11-27 VITALS
SYSTOLIC BLOOD PRESSURE: 191 MMHG | RESPIRATION RATE: 16 BRPM | HEART RATE: 88 BPM | TEMPERATURE: 98.1 F | DIASTOLIC BLOOD PRESSURE: 97 MMHG

## 2017-11-27 DIAGNOSIS — L97.929 VENOUS ULCER OF LEFT LEG (HCC): Primary | ICD-10-CM

## 2017-11-27 DIAGNOSIS — I83.029 VENOUS ULCER OF LEFT LEG (HCC): Primary | ICD-10-CM

## 2017-11-27 PROCEDURE — 11042 DBRDMT SUBQ TIS 1ST 20SQCM/<: CPT | Performed by: SURGERY

## 2017-11-27 RX ORDER — LIDOCAINE HYDROCHLORIDE 40 MG/ML
SOLUTION TOPICAL ONCE
Status: DISCONTINUED | OUTPATIENT
Start: 2017-11-27 | End: 2017-11-28 | Stop reason: HOSPADM

## 2017-11-27 NOTE — PROGRESS NOTES
comprehensive review of systems was negative. Objective:      BP (!) 191/97   Pulse 88   Temp 98.1 °F (36.7 °C) (Oral)   Resp 16     PHYSICAL EXAM    General Appearance: alert and oriented to person, place and time, well developed and well- nourished, in no acute distress  Head: normocephalic and atraumatic  Eyes: pupils equal, round, and reactive to light, extraocular eye movements intact, conjunctivae normal  Neck: supple and non-tender without mass, no thyromegaly or thyroid nodules, no cervical lymphadenopathy  Neurologic: reflexes normal and symmetric, no cranial nerve deficit, gait, coordination and speech normal      Assessment:     Patient Active Problem List   Diagnosis    Umbilical hernia    Chronic venous hypertension (idiopathic) with ulcer and inflammation of left lower extremity (HCC)    Pain and swelling of left lower extremity    Venous ulcer of left leg ([address]       Procedure Note    Performed by: Romeo Loera MD    Consent obtained: Yes    Time out taken:  Yes    Pain Control: Anesthetic  Anesthetic: 4% Lidocaine Liquid Topical     Debridement:Excisional Debridement    Using curette the wound was sharply debrided    down through and including the removal of epidermis, dermis and subcutaneous tissue. Devitalized Tissue Debrided:  fibrin, biofilm and slough    Pre Debridement Measurements:  Are located in the Wound Documentation Flow Sheet    Wound #: 2     Post  Debridement Measurements:  Wound 04/24/17 Venous ulcer Leg Left;Lateral;Distal #2 (Active)   Wound Image   11/13/2017  9:56 AM   Wound Type Wound 11/27/2017  9:43 AM   Wound Venous 11/27/2017  9:43 AM   Dressing Status Clean;Dry; Intact 10/23/2017 10:22 AM   Dressing Changed Changed/New 10/23/2017 10:22 AM   Dressing/Treatment Other (Comment) 11/20/2017 10:43 AM   Wound Cleansed Rinsed/Irrigated with saline 11/27/2017  9:43 AM   Wound Length (cm) 0.4 cm 11/27/2017 10:24 AM   Wound Width (cm) 0.5 cm 11/27/2017 10:24 AM Wound Depth (cm)  0.1 11/27/2017 10:24 AM   Calculated Wound Size (cm^2) (l*w) 0.2 cm^2 11/27/2017 10:24 AM   Change in Wound Size % (l*w) 96.55 11/27/2017 10:24 AM   Wound Assessment Yellow 11/27/2017  9:43 AM   Drainage Amount Moderate 11/27/2017 10:24 AM   Drainage Description Tan 11/27/2017  9:43 AM   Odor None 11/27/2017  9:43 AM   Flores-wound Assessment Pink 11/27/2017  9:43 AM   Clarita%Wound Bed 0 11/27/2017  9:43 AM   Red%Wound Bed 0 11/27/2017  9:43 AM   Yellow%Wound Bed 100 11/27/2017  9:43 AM   Black%Wound Bed 0 11/27/2017  9:43 AM   Purple%Wound Bed 0 11/27/2017  9:43 AM   Other%Wound Bed 0 11/27/2017  9:43 AM   Time out Yes 11/27/2017 10:24 AM   Op First Treatment Date 04/24/17 4/24/2017 10:08 AM   Number of days: 217           Total Surface Area Debrided:  0.2 sq cm     Percentage of wound debrided 100%    Bleeding:  Minimal    Hemostasis Achieved:  not needed    Procedural Pain:  3  / 10     Post Procedural Pain:  0 / 10     Response to treatment:  Well tolerated by patient. Plan:     The nature of the patient's condition was explained in depth.  The patient was informed that their compliance to the treatment plan is paramount to successful healing and prevention of further ulceration and/or infection     Discharge Treatment  continue Hydrofera Blue and even a wrap left leg, follow-up in one week    Written Patient Discharge Instructions Given            Electronically signed by Spencer Momin MD on 11/27/2017 at 10:25 AM

## 2017-11-27 NOTE — PLAN OF CARE
Problem: Wound:  Goal: Will show signs of wound healing; wound closure and no evidence of infection  Will show signs of wound healing; wound closure and no evidence of infection  Outcome: Ongoing  Discharge instructions given. Patient verbalized understanding. Return to [address] [place] [address],[address] in 1 week.     [] antibiotics    [] X-ray     [] Culture   [x] Debridement      [] HBO Evaluation    [] LABS   [] Vascular Studies []

## 2017-12-04 ENCOUNTER — HOSPITAL ENCOUNTER (OUTPATIENT)
Dept: WOUND CARE | Age: 57
Discharge: HOME | End: 2017-12-04
Attending: SURGERY | Admitting: SURGERY

## 2017-12-04 VITALS
WEIGHT: 293 LBS | SYSTOLIC BLOOD PRESSURE: 148 MMHG | RESPIRATION RATE: 16 BRPM | BODY MASS INDEX: 47.09 KG/M2 | TEMPERATURE: 99.5 F | DIASTOLIC BLOOD PRESSURE: 88 MMHG | HEIGHT: 66 IN | HEART RATE: 90 BPM

## 2017-12-04 DIAGNOSIS — L97.929 CHRONIC VENOUS HYPERTENSION (IDIOPATHIC) WITH ULCER AND INFLAMMATION OF LEFT LOWER EXTREMITY (HCC): Primary | ICD-10-CM

## 2017-12-04 DIAGNOSIS — I87.332 CHRONIC VENOUS HYPERTENSION (IDIOPATHIC) WITH ULCER AND INFLAMMATION OF LEFT LOWER EXTREMITY (HCC): Primary | ICD-10-CM

## 2017-12-04 PROCEDURE — 99212 OFFICE O/P EST SF 10 MIN: CPT | Performed by: SURGERY

## 2017-12-04 RX ORDER — LIDOCAINE HYDROCHLORIDE 40 MG/ML
SOLUTION TOPICAL ONCE
Status: DISCONTINUED | OUTPATIENT
Start: 2017-12-04 | End: 2017-12-05 | Stop reason: HOSPADM

## 2017-12-04 NOTE — PROGRESS NOTES
[facility]  Progress Note and Procedure Note      Kamlesh Vasquez  AGE: 56 y.o. GENDER: female  : 1960  TODAY'S DATE:  2017    Subjective:     Chief Complaint   Patient presents with    Wound Check     left lower lateral leg         HISTORY of PRESENT ILLNESS HPI     Kamlesh Vasquez is a 56 y.o. female who presents today for wound evaluation. History of Wound: Left lateral leg venous stasis ulcer now healed  Wound Pain:  mild  Severity:  3 / 10   Wound Type:  venous  Modifying Factors:  venous stasis  Associated Signs/Symptoms:  none        PAST MEDICAL HISTORY        Diagnosis Date    Arthritis     Hypertension     Movement disorder        PAST SURGICAL HISTORY    Past Surgical History:   Procedure Laterality Date    APPENDECTOMY      HERNIA REPAIR      HYSTERECTOMY         FAMILY HISTORY    Family History   Problem Relation Age of Onset    Cancer Father     Heart Disease Mother     High Blood Pressure Mother        SOCIAL HISTORY    Social History   Substance Use Topics    Smoking status: Never Smoker    Smokeless tobacco: Never Used    Alcohol use No       ALLERGIES    Allergies   Allergen Reactions    Sulfa Antibiotics Swelling and Itching       MEDICATIONS    Current Outpatient Prescriptions on File Prior to Encounter   Medication Sig Dispense Refill    amLODIPine (NORVASC) 5 MG tablet TK 1 T PO  QD  5    losartan (COZAAR) 100 MG tablet TK 1 T PO QD  6    celecoxib (CELEBREX) 200 MG capsule Take 200 mg by mouth      aspirin 81 MG chewable tablet Take 81 mg by mouth daily      ibuprofen (ADVIL;MOTRIN) 200 MG tablet Take 200 mg by mouth every 6 hours as needed for Pain      naproxen (EC-NAPROSYN) 500 MG EC tablet Take 1 tablet by mouth every 12 hours as needed for Pain 20 tablet 3     No current facility-administered medications on file prior to encounter. REVIEW OF SYSTEMS    A comprehensive review of systems was negative.       Objective:      BP (!) 148/88   Pulse 90   Temp 99.5 °F (37.5 °C) (Oral)   Resp 16   Ht 5' 6\" (1.676 m)   Wt (!) 375 lb (170.1 kg)   BMI 60.53 kg/m²     PHYSICAL EXAM    General Appearance: alert and oriented to person, place and time, well developed and well- nourished, in no acute distress  Skin: warm and dry, stasis dermatitis left calf with healed venous stasis ulcer  Head: normocephalic and atraumatic  Eyes: pupils equal, round, and reactive to light, extraocular eye movements intact, conjunctivae normal  Neck: supple and non-tender without mass, no thyromegaly or thyroid nodules, no cervical lymphadenopathy  Musculoskeletal: normal range of motion, no joint swelling, deformity or tenderness  Neurologic: reflexes normal and symmetric, no cranial nerve deficit, gait, coordination and speech normal      Assessment:     Patient Active Problem List   Diagnosis    Umbilical hernia    Chronic venous hypertension (idiopathic) with ulcer and inflammation of left lower extremity (HCC)    Pain and swelling of left lower extremity    Venous ulcer of left leg ([address]       Wound 04/24/17 Venous ulcer Leg Left;Lateral;Distal #2 (Active)   Wound Image   11/13/2017  9:56 AM   Wound Type Wound 12/4/2017  9:55 AM   Wound Venous 12/4/2017  9:55 AM   Dressing Status Clean;Dry; Intact 10/23/2017 10:22 AM   Dressing Changed Changed/New 10/23/2017 10:22 AM   Dressing/Treatment Other (Comment) 11/27/2017  9:43 AM   Wound Cleansed Rinsed/Irrigated with saline 12/4/2017  9:55 AM   Wound Length (cm) 0 cm 12/4/2017  9:55 AM   Wound Width (cm) 0 cm 12/4/2017  9:55 AM   Wound Depth (cm)  0 12/4/2017  9:55 AM   Calculated Wound Size (cm^2) (l*w) 0 cm^2 12/4/2017  9:55 AM   Change in Wound Size % (l*w) 100 12/4/2017  9:55 AM   Wound Assessment Epithelialization 12/4/2017  9:55 AM   Drainage Amount None 12/4/2017  9:55 AM   Drainage Description Tan 11/27/2017  9:43 AM   Odor None 12/4/2017  9:55 AM   Flores-wound Assessment Dry 12/4/2017  9:55 AM   Pink%Wound

## 2017-12-04 NOTE — PROGRESS NOTES
[employer] Application   Below Knee    NAME:  Radha Gomez  YOB: 1960  MEDICAL RECORD NUMBER:  [id number]  DATE:  12/4/2017     Removed old Unna boot if indicated and wash leg with mild soap and water. Applied moisturizing agent to dry skin as needed. Appied primary and secondary dressing as ordered     Applied Unna roll from toes to knee overlapping each time. Applied cast padding and coban from toes to below the knee. Instructed patient/caregiver to keep dressing dry and intact. DO NOT REMOVE      DRESSING. Instructed pt/family/caregiver to report excessive draining, loose bandage, wet dressing, severe pain or tingling in toes. Applied [employer] dressing below the knee to Left lower leg(s)      Unna Boot(s) were applied per  Guidelines.      Electronically signed by David Hinojosa RN on 12/4/2017 at 10:26 AM

## 2017-12-04 NOTE — PLAN OF CARE
Problem: Wound:  Goal: Will show signs of wound healing; wound closure and no evidence of infection  Will show signs of wound healing; wound closure and no evidence of infection  Outcome: Ongoing  Discharge instructions given. Patient verbalized understanding. Return to [facility] in 1 week.       [] antibiotics    [] X-ray     [] Culture   [] Debridement      [] HBO Evaluation    [] LABS   [] Vascular Studies []

## 2017-12-11 ENCOUNTER — HOSPITAL ENCOUNTER (OUTPATIENT)
Dept: WOUND CARE | Age: 57
Discharge: HOME | End: 2017-12-11
Attending: SURGERY | Admitting: SURGERY

## 2017-12-11 VITALS
RESPIRATION RATE: 16 BRPM | TEMPERATURE: 97.2 F | DIASTOLIC BLOOD PRESSURE: 90 MMHG | SYSTOLIC BLOOD PRESSURE: 187 MMHG | HEART RATE: 87 BPM

## 2017-12-11 DIAGNOSIS — L97.929 CHRONIC VENOUS HYPERTENSION (IDIOPATHIC) WITH ULCER AND INFLAMMATION OF LEFT LOWER EXTREMITY (HCC): Primary | ICD-10-CM

## 2017-12-11 DIAGNOSIS — I87.332 CHRONIC VENOUS HYPERTENSION (IDIOPATHIC) WITH ULCER AND INFLAMMATION OF LEFT LOWER EXTREMITY (HCC): Primary | ICD-10-CM

## 2017-12-11 PROCEDURE — 99212 OFFICE O/P EST SF 10 MIN: CPT | Performed by: SURGERY

## 2017-12-11 NOTE — PROGRESS NOTES
Pulse 87   Temp 97.2 °F (36.2 °C) (Oral)   Resp 16     PHYSICAL EXAM    General Appearance: alert and oriented to person, place and time, well developed and well- nourished, in no acute distress  Skin: warm and dry, no rash or erythema  Head: normocephalic and atraumatic  Eyes: pupils equal, round, and reactive to light, extraocular eye movements intact, conjunctivae normal  Neck: supple and non-tender without mass, no thyromegaly or thyroid nodules, no cervical lymphadenopathy  Abdomen: soft, non-tender, non-distended, normal bowel sounds, no masses or organomegaly  Musculoskeletal: normal range of motion, no joint swelling, deformity or tenderness  Neurologic: reflexes normal and symmetric, no cranial nerve deficit, gait, coordination and speech normal      Assessment:     Patient Active Problem List   Diagnosis    Umbilical hernia    Chronic venous hypertension (idiopathic) with ulcer and inflammation of left lower extremity (HCC)    Pain and swelling of left lower extremity    Venous ulcer of left leg ([address]       Wound 04/24/17 Venous ulcer Leg Left;Lateral;Distal #2 (Active)   Wound Image   11/13/2017  9:56 AM   Wound Type Wound 12/11/2017 11:37 AM   Wound Venous 12/11/2017 11:37 AM   Dressing Status Clean;Dry; Intact 12/4/2017 10:24 AM   Dressing Changed Changed/New 12/4/2017 10:24 AM   Dressing/Treatment Other (Comment) 12/4/2017 10:24 AM   Wound Cleansed Rinsed/Irrigated with saline 12/11/2017 11:37 AM   Wound Length (cm) 0 cm 12/11/2017 11:37 AM   Wound Width (cm) 0 cm 12/11/2017 11:37 AM   Wound Depth (cm)  0 12/11/2017 11:37 AM   Calculated Wound Size (cm^2) (l*w) 0 cm^2 12/11/2017 11:37 AM   Change in Wound Size % (l*w) 100 12/11/2017 11:37 AM   Wound Assessment Epithelialization; Other (Comment) 12/11/2017 11:37 AM   Drainage Amount None 12/11/2017 11:37 AM   Drainage Description Tan 11/27/2017  9:43 AM   Odor None 12/11/2017 11:37 AM   Flores-wound Assessment Dry;Pink 12/11/2017 11:37 AM   Pink%Wound

## 2017-12-14 ENCOUNTER — HOSPITAL ENCOUNTER (OUTPATIENT)
Dept: NON INVASIVE DIAGNOSTICS | Age: 57
Discharge: HOME | End: 2017-12-14
Attending: INTERNAL MEDICINE | Admitting: INTERNAL MEDICINE

## 2017-12-14 VITALS — BODY MASS INDEX: 59.89 KG/M2 | WEIGHT: 293 LBS

## 2017-12-14 DIAGNOSIS — R06.02 SHORTNESS OF BREATH: ICD-10-CM

## 2017-12-14 RX ORDER — DOBUTAMINE HYDROCHLORIDE 200 MG/100ML
10 INJECTION INTRAVENOUS CONTINUOUS
Status: DISCONTINUED | OUTPATIENT
Start: 2017-12-14 | End: 2017-12-15 | Stop reason: HOSPADM

## 2017-12-14 RX ORDER — ATROPINE SULFATE 1 MG/ML
1 INJECTION, SOLUTION INTRAMUSCULAR; INTRAVENOUS; SUBCUTANEOUS
Status: DISCONTINUED | OUTPATIENT
Start: 2017-12-14 | End: 2017-12-14

## 2017-12-14 RX ADMIN — DOBUTAMINE HYDROCHLORIDE 10 MCG/KG/MIN: 200 INJECTION INTRAVENOUS at 14:32

## 2023-03-02 ENCOUNTER — APPOINTMENT (OUTPATIENT)
Dept: CT IMAGING | Age: 63
End: 2023-03-02
Payer: COMMERCIAL

## 2023-03-02 ENCOUNTER — HOSPITAL ENCOUNTER (EMERGENCY)
Age: 63
Discharge: ANOTHER HOSPITAL | End: 2023-03-03
Attending: EMERGENCY MEDICINE
Payer: COMMERCIAL

## 2023-03-02 DIAGNOSIS — K56.609 SMALL BOWEL OBSTRUCTION (HCC): ICD-10-CM

## 2023-03-02 DIAGNOSIS — R10.10 PAIN OF UPPER ABDOMEN: Primary | ICD-10-CM

## 2023-03-02 LAB
A/G RATIO: 1.2 (ref 1.1–2.2)
ALBUMIN SERPL-MCNC: 4.4 G/DL (ref 3.4–5)
ALP BLD-CCNC: 119 U/L (ref 40–129)
ALT SERPL-CCNC: 16 U/L (ref 10–40)
ANION GAP SERPL CALCULATED.3IONS-SCNC: 10 MMOL/L (ref 3–16)
AST SERPL-CCNC: 15 U/L (ref 15–37)
BACTERIA: ABNORMAL /HPF
BASOPHILS ABSOLUTE: 0 K/UL (ref 0–0.2)
BASOPHILS RELATIVE PERCENT: 0.4 %
BILIRUB SERPL-MCNC: 0.6 MG/DL (ref 0–1)
BILIRUBIN URINE: NEGATIVE
BLOOD, URINE: ABNORMAL
BUN BLDV-MCNC: 15 MG/DL (ref 7–20)
CALCIUM SERPL-MCNC: 9.4 MG/DL (ref 8.3–10.6)
CHLORIDE BLD-SCNC: 102 MMOL/L (ref 99–110)
CLARITY: ABNORMAL
CO2: 27 MMOL/L (ref 21–32)
COLOR: YELLOW
CREAT SERPL-MCNC: 0.7 MG/DL (ref 0.6–1.2)
EOSINOPHILS ABSOLUTE: 0 K/UL (ref 0–0.6)
EOSINOPHILS RELATIVE PERCENT: 0.1 %
EPITHELIAL CELLS, UA: 16 /HPF (ref 0–5)
GFR SERPL CREATININE-BSD FRML MDRD: >60 ML/MIN/{1.73_M2}
GLUCOSE BLD-MCNC: 154 MG/DL (ref 70–99)
GLUCOSE URINE: NEGATIVE MG/DL
HCT VFR BLD CALC: 45.8 % (ref 36–48)
HEMOGLOBIN: 15 G/DL (ref 12–16)
HYALINE CASTS: 1 /LPF (ref 0–8)
KETONES, URINE: 40 MG/DL
LACTIC ACID: 1.1 MMOL/L (ref 0.4–2)
LEUKOCYTE ESTERASE, URINE: ABNORMAL
LIPASE: 41 U/L (ref 13–60)
LYMPHOCYTES ABSOLUTE: 0.6 K/UL (ref 1–5.1)
LYMPHOCYTES RELATIVE PERCENT: 5.9 %
MCH RBC QN AUTO: 29.5 PG (ref 26–34)
MCHC RBC AUTO-ENTMCNC: 32.9 G/DL (ref 31–36)
MCV RBC AUTO: 89.8 FL (ref 80–100)
MICROSCOPIC EXAMINATION: YES
MONOCYTES ABSOLUTE: 0.5 K/UL (ref 0–1.3)
MONOCYTES RELATIVE PERCENT: 4.9 %
NEUTROPHILS ABSOLUTE: 9 K/UL (ref 1.7–7.7)
NEUTROPHILS RELATIVE PERCENT: 88.7 %
NITRITE, URINE: NEGATIVE
PDW BLD-RTO: 13.2 % (ref 12.4–15.4)
PH UA: 5.5 (ref 5–8)
PLATELET # BLD: 196 K/UL (ref 135–450)
PMV BLD AUTO: 9.8 FL (ref 5–10.5)
POTASSIUM REFLEX MAGNESIUM: 3.9 MMOL/L (ref 3.5–5.1)
PROTEIN UA: 30 MG/DL
RBC # BLD: 5.1 M/UL (ref 4–5.2)
RBC UA: 26 /HPF (ref 0–4)
REASON FOR REJECTION: NORMAL
REJECTED TEST: NORMAL
SODIUM BLD-SCNC: 139 MMOL/L (ref 136–145)
SPECIFIC GRAVITY UA: 1.02 (ref 1–1.03)
TOTAL PROTEIN: 8.2 G/DL (ref 6.4–8.2)
URINE REFLEX TO CULTURE: YES
URINE TYPE: ABNORMAL
UROBILINOGEN, URINE: 1 E.U./DL
WBC # BLD: 10.2 K/UL (ref 4–11)
WBC UA: 12 /HPF (ref 0–5)

## 2023-03-02 PROCEDURE — 83690 ASSAY OF LIPASE: CPT

## 2023-03-02 PROCEDURE — 96375 TX/PRO/DX INJ NEW DRUG ADDON: CPT

## 2023-03-02 PROCEDURE — 80053 COMPREHEN METABOLIC PANEL: CPT

## 2023-03-02 PROCEDURE — 96374 THER/PROPH/DIAG INJ IV PUSH: CPT

## 2023-03-02 PROCEDURE — 85025 COMPLETE CBC W/AUTO DIFF WBC: CPT

## 2023-03-02 PROCEDURE — 74177 CT ABD & PELVIS W/CONTRAST: CPT

## 2023-03-02 PROCEDURE — 6360000002 HC RX W HCPCS: Performed by: EMERGENCY MEDICINE

## 2023-03-02 PROCEDURE — 6360000002 HC RX W HCPCS

## 2023-03-02 PROCEDURE — 83605 ASSAY OF LACTIC ACID: CPT

## 2023-03-02 PROCEDURE — 36415 COLL VENOUS BLD VENIPUNCTURE: CPT

## 2023-03-02 PROCEDURE — 6360000002 HC RX W HCPCS: Performed by: STUDENT IN AN ORGANIZED HEALTH CARE EDUCATION/TRAINING PROGRAM

## 2023-03-02 PROCEDURE — 96376 TX/PRO/DX INJ SAME DRUG ADON: CPT

## 2023-03-02 PROCEDURE — 99285 EMERGENCY DEPT VISIT HI MDM: CPT

## 2023-03-02 PROCEDURE — 6360000004 HC RX CONTRAST MEDICATION: Performed by: EMERGENCY MEDICINE

## 2023-03-02 PROCEDURE — 87086 URINE CULTURE/COLONY COUNT: CPT

## 2023-03-02 PROCEDURE — 81001 URINALYSIS AUTO W/SCOPE: CPT

## 2023-03-02 RX ORDER — ONDANSETRON 2 MG/ML
INJECTION INTRAMUSCULAR; INTRAVENOUS
Status: COMPLETED
Start: 2023-03-02 | End: 2023-03-02

## 2023-03-02 RX ORDER — MORPHINE SULFATE 4 MG/ML
4 INJECTION, SOLUTION INTRAMUSCULAR; INTRAVENOUS ONCE
Status: COMPLETED | OUTPATIENT
Start: 2023-03-02 | End: 2023-03-02

## 2023-03-02 RX ORDER — PROMETHAZINE HYDROCHLORIDE 25 MG/1
25 TABLET ORAL EVERY 6 HOURS PRN
Status: ON HOLD | COMMUNITY
End: 2023-03-09 | Stop reason: HOSPADM

## 2023-03-02 RX ORDER — DOCUSATE SODIUM 100 MG/1
100 CAPSULE, LIQUID FILLED ORAL 2 TIMES DAILY
Status: ON HOLD | COMMUNITY
End: 2023-03-09 | Stop reason: SDUPTHER

## 2023-03-02 RX ORDER — ONDANSETRON 2 MG/ML
4 INJECTION INTRAMUSCULAR; INTRAVENOUS ONCE
Status: COMPLETED | OUTPATIENT
Start: 2023-03-02 | End: 2023-03-02

## 2023-03-02 RX ORDER — ACETAMINOPHEN 325 MG/1
650 TABLET ORAL EVERY 6 HOURS PRN
COMMUNITY

## 2023-03-02 RX ORDER — ONDANSETRON 4 MG/1
4 TABLET, ORALLY DISINTEGRATING ORAL EVERY 8 HOURS PRN
COMMUNITY

## 2023-03-02 RX ORDER — CETIRIZINE HYDROCHLORIDE 10 MG/1
10 TABLET ORAL NIGHTLY
COMMUNITY

## 2023-03-02 RX ORDER — MORPHINE SULFATE 4 MG/ML
4 INJECTION, SOLUTION INTRAMUSCULAR; INTRAVENOUS EVERY 4 HOURS PRN
Status: DISCONTINUED | OUTPATIENT
Start: 2023-03-02 | End: 2023-03-02

## 2023-03-02 RX ADMIN — IOPAMIDOL 75 ML: 755 INJECTION, SOLUTION INTRAVENOUS at 14:29

## 2023-03-02 RX ADMIN — MORPHINE SULFATE 4 MG: 4 INJECTION, SOLUTION INTRAMUSCULAR; INTRAVENOUS at 15:15

## 2023-03-02 RX ADMIN — ONDANSETRON 4 MG: 2 INJECTION INTRAMUSCULAR; INTRAVENOUS at 15:17

## 2023-03-02 RX ADMIN — MORPHINE SULFATE 4 MG: 4 INJECTION, SOLUTION INTRAMUSCULAR; INTRAVENOUS at 23:29

## 2023-03-02 RX ADMIN — ONDANSETRON 4 MG: 2 INJECTION INTRAMUSCULAR; INTRAVENOUS at 23:28

## 2023-03-02 ASSESSMENT — PAIN DESCRIPTION - PAIN TYPE
TYPE: ACUTE PAIN;CHRONIC PAIN
TYPE: ACUTE PAIN

## 2023-03-02 ASSESSMENT — PAIN DESCRIPTION - DESCRIPTORS
DESCRIPTORS: ACHING
DESCRIPTORS: ACHING
DESCRIPTORS: ACHING;THROBBING

## 2023-03-02 ASSESSMENT — PAIN SCALES - GENERAL
PAINLEVEL_OUTOF10: 2
PAINLEVEL_OUTOF10: 6
PAINLEVEL_OUTOF10: 10
PAINLEVEL_OUTOF10: 8

## 2023-03-02 ASSESSMENT — PAIN DESCRIPTION - LOCATION
LOCATION: ABDOMEN

## 2023-03-02 ASSESSMENT — PAIN DESCRIPTION - ORIENTATION
ORIENTATION: RIGHT;LEFT;LOWER
ORIENTATION: RIGHT;LEFT;LOWER

## 2023-03-02 ASSESSMENT — PAIN - FUNCTIONAL ASSESSMENT: PAIN_FUNCTIONAL_ASSESSMENT: 0-10

## 2023-03-02 NOTE — PROGRESS NOTES
Pharmacy Home Medication Reconciliation Note    A medication reconciliation has been completed for Elliot Booker 1960    Pharmacy: [facility] [address]  Information provided by: patient    The patient's home medication list is as follows: No current facility-administered medications on file prior to encounter. Current Outpatient Medications on File Prior to Encounter   Medication Sig Dispense Refill    cetirizine (ZYRTEC) 10 MG tablet Take 10 mg by mouth at bedtime      Multiple Vitamins-Minerals (WOMENS MULTIVITAMIN) TABS Take by mouth daily      ondansetron (ZOFRAN-ODT) 4 MG disintegrating tablet Take 4 mg by mouth every 8 hours as needed for Nausea or Vomiting      docusate sodium (COLACE) 100 MG capsule Take 100 mg by mouth 2 times daily      promethazine (PHENERGAN) 25 MG tablet Take 25 mg by mouth every 6 hours as needed for Nausea      acetaminophen (TYLENOL) 325 MG tablet Take 650 mg by mouth every 6 hours as needed for Pain      amLODIPine (NORVASC) 5 MG tablet TK 1 T PO  QD  5    losartan (COZAAR) 100 MG tablet Take 100 mg by mouth nightly  6    celecoxib (CELEBREX) 200 MG capsule Take 200 mg by mouth      aspirin 81 MG chewable tablet Take 81 mg by mouth daily (Patient not taking: Reported on 3/2/2023)      ibuprofen (ADVIL;MOTRIN) 200 MG tablet Take 200 mg by mouth every 6 hours as needed for Pain (Patient not taking: Reported on 3/2/2023)      naproxen (EC-NAPROSYN) 500 MG EC tablet Take 1 tablet by mouth every 12 hours as needed for Pain (Patient not taking: Reported on 3/2/2023) 20 tablet 3       Patient is no longer taking Aspirin 81mg, Ibuprofen, Naproxen      Timing of last doses updated. Thank you,  [employer]et

## 2023-03-02 NOTE — ED PROVIDER NOTES
[address] [facility]  EMERGENCY DEPARTMENTENCOUNTER      Pt Name: Priyank Love  MRN: [id number]  [place] 1960  Date ofevaluation: 3/2/2023  Provider: Princess Olivarez MD    CHIEF COMPLAINT       Chief Complaint   Patient presents with    Abdominal Pain     Pt c/o abdominal pain 6. Pt went in for Gastric bypass at [place] [address] on 3-1-23 surgery was aborted d/y hernia/scar tissue. Pt called surgeon r/t nausea/pain and was told to come here for IV pain medication. [place] EMT. HPI    HISTORY OF PRESENT ILLNESS   (Location/Symptom, Timing/Onset,Context/Setting, Quality, Duration, Modifying Factors, Severity)  Note limiting factors. Priyank Love is a 63 y.o. female who presents to the emergency department with abdominal pain. This is a 29-year-old female with a complicated history. The patient is morbidly obese and just had endoscopic surgery done yesterday. This was done by [facility] bariat[facility] center. The surgery was attempted apparently by Dr. Danell Holter. The patient tells me that he was unable to do the surgery because of a hernia. She did undergo a laparoscopic surgery initially however. The patient presents today with increasing abdominal pain. The patient states her pain medications are not helping. She has not been passing gas or flatulence. She denies any nausea or vomiting. She has not had bowel movements for the last several days as well. NursingNotes were reviewed. Review of Systems    REVIEW OF SYSTEMS    (2-9 systems for level 4, 10 or more for level 5)     Review of Systems   Constitutional: Negative for fever. HENT: Negative for rhinorrhea and sore throat. Eyes: Negative for redness. Respiratory: Negative for shortness of breath. Cardiovascular: Negative for chest pain. Gastrointestinal: Positive for abdominal pain. Genitourinary: Negative for flank pain. Neurological: Negative for headaches.    Hematological: Negative for adenopathy. Psychiatric/Behavioral: Negative for confusion. Except as noted above the remainder of the review of systems was reviewed and negative.        PAST MEDICAL HISTORY     Past Medical History:   Diagnosis Date    Arthritis     Hypertension     Movement disorder          SURGICALHISTORY       Past Surgical History:   Procedure Laterality Date    APPENDECTOMY      HERNIA REPAIR      HYSTERECTOMY (CERVIX STATUS UNKNOWN)           CURRENT MEDICATIONS       Previous Medications    ACETAMINOPHEN (TYLENOL) 325 MG TABLET    Take 650 mg by mouth every 6 hours as needed for Pain    AMLODIPINE (NORVASC) 5 MG TABLET    TK 1 T PO  QD    ASPIRIN 81 MG CHEWABLE TABLET    Take 81 mg by mouth daily    CELECOXIB (CELEBREX) 200 MG CAPSULE    Take 200 mg by mouth    CETIRIZINE (ZYRTEC) 10 MG TABLET    Take 10 mg by mouth at bedtime    DOCUSATE SODIUM (COLACE) 100 MG CAPSULE    Take 100 mg by mouth 2 times daily    IBUPROFEN (ADVIL;MOTRIN) 200 MG TABLET    Take 200 mg by mouth every 6 hours as needed for Pain    LOSARTAN (COZAAR) 100 MG TABLET    Take 100 mg by mouth nightly    MULTIPLE VITAMINS-MINERALS (WOMENS MULTIVITAMIN) TABS    Take by mouth daily    NAPROXEN (EC-NAPROSYN) 500 MG EC TABLET    Take 1 tablet by mouth every 12 hours as needed for Pain    ONDANSETRON (ZOFRAN-ODT) 4 MG DISINTEGRATING TABLET    Take 4 mg by mouth every 8 hours as needed for Nausea or Vomiting    PROMETHAZINE (PHENERGAN) 25 MG TABLET    Take 25 mg by mouth every 6 hours as needed for Nausea       ALLERGIES     Sulfa antibiotics    FAMILY HISTORY       Family History   Problem Relation Age of Onset    Cancer Father     Heart Disease Mother     High Blood Pressure Mother           SOCIAL HISTORY       Social History     Socioeconomic History    Marital status: Single     Spouse name: None    Number of children: None    Years of education: None    Highest education level: None   Tobacco Use    Smoking status: Never    Smokeless tobacco: Never   Substance and Sexual Activity    Alcohol use: No    Drug use: No       SCREENINGS    Agustín Coma Scale  Eye Opening: Spontaneous  Best Verbal Response: Oriented  Best Motor Response: Obeys commands  Agustín Coma Scale Score: 15        PHYSICAL EXAM    (up to 7 for level 4, 8 or more for level 5)     ED Triage Vitals [03/02/23 1222]   BP Temp Temp Source Heart Rate Resp SpO2 Height Weight   (!) 184/78 98.8 °F (37.1 °C) Oral 66 20 94 % 5' 5\" (1.651 m) 276 lb (125.2 kg)       Physical Exam:      General Appearance:  Alert, cooperative, appears stated age. Head:  Normocephalic, without obvious abnormality, atraumatic. Eyes:  conjunctiva/corneas clear, EOM's intact. Sclera anicteric. ENT: Mucous remains moist and pink   Neck: Supple, symmetrical, trachea midline, no adenopathy. No jugular venous distention. Lungs:   Clear to auscultation bilaterally   Chest Wall:  No pain to palpation   Heart:   Genitourinary: Regular rate rhythm with no murmurs rubs gallops  Deferred   Abdomen:   Patient was obese. She had a very large ventral hernia. She had decreased bowel sounds. Extremities: No clubbing cyanosis or edema   Pulses: Good throughout   Skin:  No rashes or lesions to exposed skin. Neurologic: Alert and oriented X 3. DIAGNOSTIC RESULTS         RADIOLOGY:   Non-plain filmimages such as CT, Ultrasound and MRI are read by the radiologist. Plain radiographic images are visualized and preliminarily interpreted by the emergency physician with the below findings:    See below    Interpretation per the Radiologist below, if available at the time ofthis note: All incidental findings were discussed with the patient. CT ABDOMEN PELVIS W IV CONTRAST Additional Contrast? None   Final Result   1. Large ventral abdominal wall hernia just right of the midline with a 7.7   cm neck.   Multiple loops of small bowel likely representing mostly ileum and   some jejunum have passed into the hernia sac. A few of these loops are   dilated measuring up to 3.3 cm in diameter. This is compatible with small   bowel obstruction. 2.  There is a large amount of subcutaneous air within the ventral abdomen   tracking the right inguinal region and along the right flank. This could be   due to recent surgical intervention. If no surgical intervention then this   is concerning for possible enterocutaneous fistula               ED BEDSIDE ULTRASOUND:   Performed by ED Physician - none    LABS:  Labs Reviewed   CBC WITH AUTO DIFFERENTIAL - Abnormal; Notable for the following components:       Result Value    Neutrophils Absolute 9.0 (*)     Lymphocytes Absolute 0.6 (*)     All other components within normal limits    Narrative:     Katrina Christie  [facility] tel. [phone],  Rejected Test Name/Called to: Luis Chen, 03/02/2023 12:58, [facility]   URINALYSIS WITH REFLEX TO CULTURE - Abnormal; Notable for the following components:    Clarity, UA CLOUDY (*)     Ketones, Urine 40 (*)     Blood, Urine LARGE (*)     Protein, UA 30 (*)     Leukocyte Esterase, Urine SMALL (*)     All other components within normal limits   COMPREHENSIVE METABOLIC PANEL W/ REFLEX TO MG FOR LOW K - Abnormal; Notable for the following components:    Glucose 154 (*)     All other components within normal limits   MICROSCOPIC URINALYSIS - Abnormal; Notable for the following components:    WBC, UA 12 (*)     RBC, UA 26 (*)     Epithelial Cells, UA 16 (*)     All other components within normal limits   CULTURE, URINE   LACTIC ACID   SPECIMEN REJECTION    Narrative:     CARLITO Mclaughlin  [facility] tel. [phone],  Rejected Test Name/Called to: Luis Chen, 03/02/2023 12:58, by [facility]   LIPASE       All other labs were within normal range or not returned as of this dictation.     EMERGENCY DEPARTMENT COURSE and DIFFERENTIAL DIAGNOSIS/MDM:   Vitals:    Vitals:    03/02/23 1456 03/02/23 1457 03/02/23 1517 03/02/23 1632   BP:   (!) 178/86 (!) 140/85 Pulse:       Resp:       Temp:       TempSrc:       SpO2: 91% 90% 95% 93%   Weight:       Height:               MDM      The patient has remained stable throughout her hospital course. She received some morphine and Zofran for pain and nausea respectively. Her work-up included a urinalysis that was equivocal.  Finally, a CT of the patient's abdomen was obtained that shows the possibility of a small bowel obstruction with dilated loops of small bowel in her ventral hernia. I made several phone calls in regard to this. I spoke to Dr. Amisha Serna, surgeon on-call for [facility], who is very helpful and we decided that the patient was best served by being transferred to [facility] for definitive care and continuity of care. The patient is also agreeable to this. I spoke to Dr. Dean Worthy, hospitalist for the [place] [address] who is graciously excepted transfer the patient. The patient is currently in stable condition. She will be transferred for definitive care. REASSESSMENT          CRITICAL CARE TIME   Total Critical Care time was 50 minutes, excluding separatelyreportable procedures. There was a high probability ofclinically significant/life threatening deterioration in the patient's condition which required my urgent intervention. CONSULTS:  IP CONSULT TO GENERAL SURGERY    PROCEDURES:  Unless otherwise noted below, none     Procedures    FINAL IMPRESSION      1. Pain of upper abdomen    2. Small bowel obstruction [facility])          DISPOSITION/PLAN   DISPOSITION Decision To Transfer 03/02/2023 05:45:06 PM      PATIENT REFERREDTO:  No follow-up provider specified. DISCHARGEMEDICATIONS:  New Prescriptions    No medications on file     Controlled Substances Monitoring:     No flowsheet data found.     (Please note that portions of this note were completed with a voice recognition program.  Efforts were made to edit the dictations but occasionally words are mis-transcribed.)    Evelyn Price MD (electronically signed)  Attending Emergency Physician         Loyd Venegas MD  03/02/23 6000

## 2023-03-03 ENCOUNTER — HOSPITAL ENCOUNTER (INPATIENT)
Age: 63
LOS: 6 days | Discharge: HOME | End: 2023-03-09
Attending: INTERNAL MEDICINE | Admitting: INTERNAL MEDICINE
Payer: COMMERCIAL

## 2023-03-03 ENCOUNTER — APPOINTMENT (OUTPATIENT)
Dept: GENERAL RADIOLOGY | Age: 63
End: 2023-03-03
Attending: INTERNAL MEDICINE
Payer: COMMERCIAL

## 2023-03-03 VITALS
WEIGHT: 276 LBS | TEMPERATURE: 98.8 F | BODY MASS INDEX: 45.98 KG/M2 | RESPIRATION RATE: 20 BRPM | HEIGHT: 65 IN | SYSTOLIC BLOOD PRESSURE: 158 MMHG | DIASTOLIC BLOOD PRESSURE: 90 MMHG | HEART RATE: 82 BPM | OXYGEN SATURATION: 96 %

## 2023-03-03 DIAGNOSIS — K43.0 INCISIONAL HERNIA WITH OBSTRUCTION BUT NO GANGRENE: ICD-10-CM

## 2023-03-03 DIAGNOSIS — K56.609 SBO (SMALL BOWEL OBSTRUCTION) (HCC): Primary | ICD-10-CM

## 2023-03-03 LAB
ANION GAP SERPL CALCULATED.3IONS-SCNC: 9 MMOL/L (ref 3–16)
BUN BLDV-MCNC: 18 MG/DL (ref 7–20)
CALCIUM SERPL-MCNC: 8.9 MG/DL (ref 8.3–10.6)
CHLORIDE BLD-SCNC: 103 MMOL/L (ref 99–110)
CO2: 28 MMOL/L (ref 21–32)
CREAT SERPL-MCNC: 0.7 MG/DL (ref 0.6–1.2)
GFR SERPL CREATININE-BSD FRML MDRD: >60 ML/MIN/{1.73_M2}
GLUCOSE BLD-MCNC: 135 MG/DL (ref 70–99)
POTASSIUM REFLEX MAGNESIUM: 4 MMOL/L (ref 3.5–5.1)
SODIUM BLD-SCNC: 140 MMOL/L (ref 136–145)
URINE CULTURE, ROUTINE: NORMAL

## 2023-03-03 PROCEDURE — 2580000003 HC RX 258: Performed by: STUDENT IN AN ORGANIZED HEALTH CARE EDUCATION/TRAINING PROGRAM

## 2023-03-03 PROCEDURE — 2500000003 HC RX 250 WO HCPCS: Performed by: INTERNAL MEDICINE

## 2023-03-03 PROCEDURE — 2580000003 HC RX 258: Performed by: INTERNAL MEDICINE

## 2023-03-03 PROCEDURE — 6360000002 HC RX W HCPCS: Performed by: INTERNAL MEDICINE

## 2023-03-03 PROCEDURE — 74250 X-RAY XM SM INT 1CNTRST STD: CPT

## 2023-03-03 PROCEDURE — 1200000000 HC SEMI PRIVATE

## 2023-03-03 PROCEDURE — 36415 COLL VENOUS BLD VENIPUNCTURE: CPT

## 2023-03-03 PROCEDURE — 6360000002 HC RX W HCPCS: Performed by: STUDENT IN AN ORGANIZED HEALTH CARE EDUCATION/TRAINING PROGRAM

## 2023-03-03 PROCEDURE — 80048 BASIC METABOLIC PNL TOTAL CA: CPT

## 2023-03-03 RX ORDER — SODIUM CHLORIDE 0.9 % (FLUSH) 0.9 %
5-40 SYRINGE (ML) INJECTION PRN
Status: DISCONTINUED | OUTPATIENT
Start: 2023-03-03 | End: 2023-03-09 | Stop reason: HOSPADM

## 2023-03-03 RX ORDER — ONDANSETRON 4 MG/1
4 TABLET, ORALLY DISINTEGRATING ORAL EVERY 8 HOURS PRN
Status: DISCONTINUED | OUTPATIENT
Start: 2023-03-03 | End: 2023-03-09 | Stop reason: HOSPADM

## 2023-03-03 RX ORDER — KETOROLAC TROMETHAMINE 30 MG/ML
30 INJECTION, SOLUTION INTRAMUSCULAR; INTRAVENOUS EVERY 6 HOURS PRN
Status: DISCONTINUED | OUTPATIENT
Start: 2023-03-03 | End: 2023-03-03

## 2023-03-03 RX ORDER — POLYETHYLENE GLYCOL 3350 17 G/17G
17 POWDER, FOR SOLUTION ORAL DAILY PRN
Status: DISCONTINUED | OUTPATIENT
Start: 2023-03-03 | End: 2023-03-06

## 2023-03-03 RX ORDER — METOPROLOL TARTRATE 5 MG/5ML
5 INJECTION INTRAVENOUS EVERY 6 HOURS PRN
Status: DISCONTINUED | OUTPATIENT
Start: 2023-03-03 | End: 2023-03-09 | Stop reason: HOSPADM

## 2023-03-03 RX ORDER — SODIUM CHLORIDE 0.9 % (FLUSH) 0.9 %
5-40 SYRINGE (ML) INJECTION EVERY 12 HOURS SCHEDULED
Status: DISCONTINUED | OUTPATIENT
Start: 2023-03-03 | End: 2023-03-09 | Stop reason: HOSPADM

## 2023-03-03 RX ORDER — ACETAMINOPHEN 325 MG/1
650 TABLET ORAL EVERY 6 HOURS PRN
Status: DISCONTINUED | OUTPATIENT
Start: 2023-03-03 | End: 2023-03-04

## 2023-03-03 RX ORDER — HYDRALAZINE HYDROCHLORIDE 20 MG/ML
10 INJECTION INTRAMUSCULAR; INTRAVENOUS EVERY 6 HOURS PRN
Status: DISCONTINUED | OUTPATIENT
Start: 2023-03-03 | End: 2023-03-09 | Stop reason: HOSPADM

## 2023-03-03 RX ORDER — ACETAMINOPHEN 650 MG/1
650 SUPPOSITORY RECTAL EVERY 6 HOURS PRN
Status: DISCONTINUED | OUTPATIENT
Start: 2023-03-03 | End: 2023-03-04

## 2023-03-03 RX ORDER — ONDANSETRON 2 MG/ML
4 INJECTION INTRAMUSCULAR; INTRAVENOUS ONCE
Status: COMPLETED | OUTPATIENT
Start: 2023-03-03 | End: 2023-03-03

## 2023-03-03 RX ORDER — ENOXAPARIN SODIUM 100 MG/ML
30 INJECTION SUBCUTANEOUS 2 TIMES DAILY
Status: DISCONTINUED | OUTPATIENT
Start: 2023-03-03 | End: 2023-03-09 | Stop reason: HOSPADM

## 2023-03-03 RX ORDER — ONDANSETRON 2 MG/ML
4 INJECTION INTRAMUSCULAR; INTRAVENOUS EVERY 6 HOURS PRN
Status: DISCONTINUED | OUTPATIENT
Start: 2023-03-03 | End: 2023-03-09 | Stop reason: HOSPADM

## 2023-03-03 RX ORDER — SODIUM CHLORIDE, SODIUM LACTATE, POTASSIUM CHLORIDE, CALCIUM CHLORIDE 600; 310; 30; 20 MG/100ML; MG/100ML; MG/100ML; MG/100ML
INJECTION, SOLUTION INTRAVENOUS CONTINUOUS
Status: ACTIVE | OUTPATIENT
Start: 2023-03-03 | End: 2023-03-03

## 2023-03-03 RX ORDER — PROMETHAZINE HYDROCHLORIDE 25 MG/ML
6.25 INJECTION, SOLUTION INTRAMUSCULAR; INTRAVENOUS EVERY 6 HOURS PRN
Status: DISCONTINUED | OUTPATIENT
Start: 2023-03-03 | End: 2023-03-09 | Stop reason: HOSPADM

## 2023-03-03 RX ORDER — MORPHINE SULFATE 4 MG/ML
4 INJECTION, SOLUTION INTRAMUSCULAR; INTRAVENOUS ONCE
Status: COMPLETED | OUTPATIENT
Start: 2023-03-03 | End: 2023-03-03

## 2023-03-03 RX ORDER — SODIUM CHLORIDE 9 MG/ML
INJECTION, SOLUTION INTRAVENOUS PRN
Status: DISCONTINUED | OUTPATIENT
Start: 2023-03-03 | End: 2023-03-09 | Stop reason: HOSPADM

## 2023-03-03 RX ORDER — SODIUM CHLORIDE, SODIUM LACTATE, POTASSIUM CHLORIDE, CALCIUM CHLORIDE 600; 310; 30; 20 MG/100ML; MG/100ML; MG/100ML; MG/100ML
INJECTION, SOLUTION INTRAVENOUS CONTINUOUS
Status: DISCONTINUED | OUTPATIENT
Start: 2023-03-03 | End: 2023-03-09

## 2023-03-03 RX ADMIN — HYDRALAZINE HYDROCHLORIDE 10 MG: 20 INJECTION INTRAMUSCULAR; INTRAVENOUS at 21:22

## 2023-03-03 RX ADMIN — METOPROLOL TARTRATE 5 MG: 1 INJECTION, SOLUTION INTRAVENOUS at 15:53

## 2023-03-03 RX ADMIN — ENOXAPARIN SODIUM 30 MG: 100 INJECTION SUBCUTANEOUS at 21:14

## 2023-03-03 RX ADMIN — HYDRALAZINE HYDROCHLORIDE 10 MG: 20 INJECTION INTRAMUSCULAR; INTRAVENOUS at 12:16

## 2023-03-03 RX ADMIN — METOPROLOL TARTRATE 5 MG: 1 INJECTION, SOLUTION INTRAVENOUS at 23:51

## 2023-03-03 RX ADMIN — ONDANSETRON 4 MG: 2 INJECTION INTRAMUSCULAR; INTRAVENOUS at 08:24

## 2023-03-03 RX ADMIN — ENOXAPARIN SODIUM 30 MG: 100 INJECTION SUBCUTANEOUS at 08:21

## 2023-03-03 RX ADMIN — ONDANSETRON 4 MG: 2 INJECTION INTRAMUSCULAR; INTRAVENOUS at 02:43

## 2023-03-03 RX ADMIN — PROMETHAZINE HYDROCHLORIDE 6.25 MG: 25 INJECTION INTRAMUSCULAR; INTRAVENOUS at 13:50

## 2023-03-03 RX ADMIN — KETOROLAC TROMETHAMINE 30 MG: 30 INJECTION, SOLUTION INTRAMUSCULAR; INTRAVENOUS at 15:53

## 2023-03-03 RX ADMIN — HYDROMORPHONE HYDROCHLORIDE 0.5 MG: 1 INJECTION, SOLUTION INTRAMUSCULAR; INTRAVENOUS; SUBCUTANEOUS at 08:21

## 2023-03-03 RX ADMIN — SODIUM CHLORIDE, POTASSIUM CHLORIDE, SODIUM LACTATE AND CALCIUM CHLORIDE: 600; 310; 30; 20 INJECTION, SOLUTION INTRAVENOUS at 17:29

## 2023-03-03 RX ADMIN — ONDANSETRON 4 MG: 2 INJECTION INTRAMUSCULAR; INTRAVENOUS at 15:34

## 2023-03-03 RX ADMIN — MORPHINE SULFATE 4 MG: 4 INJECTION, SOLUTION INTRAMUSCULAR; INTRAVENOUS at 02:43

## 2023-03-03 RX ADMIN — HYDROMORPHONE HYDROCHLORIDE 0.5 MG: 1 INJECTION, SOLUTION INTRAMUSCULAR; INTRAVENOUS; SUBCUTANEOUS at 11:32

## 2023-03-03 RX ADMIN — ONDANSETRON 4 MG: 2 INJECTION INTRAMUSCULAR; INTRAVENOUS at 23:51

## 2023-03-03 RX ADMIN — SODIUM CHLORIDE, POTASSIUM CHLORIDE, SODIUM LACTATE AND CALCIUM CHLORIDE: 600; 310; 30; 20 INJECTION, SOLUTION INTRAVENOUS at 04:59

## 2023-03-03 RX ADMIN — SODIUM CHLORIDE, PRESERVATIVE FREE 10 ML: 5 INJECTION INTRAVENOUS at 08:29

## 2023-03-03 ASSESSMENT — PAIN SCALES - GENERAL
PAINLEVEL_OUTOF10: 8
PAINLEVEL_OUTOF10: 10
PAINLEVEL_OUTOF10: 8
PAINLEVEL_OUTOF10: 8
PAINLEVEL_OUTOF10: 10

## 2023-03-03 ASSESSMENT — PAIN DESCRIPTION - LOCATION
LOCATION: ABDOMEN

## 2023-03-03 ASSESSMENT — PAIN DESCRIPTION - DESCRIPTORS
DESCRIPTORS: ACHING

## 2023-03-03 ASSESSMENT — PAIN DESCRIPTION - FREQUENCY
FREQUENCY: INTERMITTENT
FREQUENCY: INTERMITTENT

## 2023-03-03 ASSESSMENT — PAIN DESCRIPTION - ONSET
ONSET: ON-GOING
ONSET: ON-GOING

## 2023-03-03 ASSESSMENT — PAIN DESCRIPTION - PAIN TYPE
TYPE: ACUTE PAIN
TYPE: ACUTE PAIN

## 2023-03-03 ASSESSMENT — PAIN DESCRIPTION - ORIENTATION
ORIENTATION: RIGHT;LEFT;UPPER
ORIENTATION: RIGHT;LEFT;UPPER

## 2023-03-03 ASSESSMENT — PAIN - FUNCTIONAL ASSESSMENT
PAIN_FUNCTIONAL_ASSESSMENT: 0-10
PAIN_FUNCTIONAL_ASSESSMENT: ACTIVITIES ARE NOT PREVENTED
PAIN_FUNCTIONAL_ASSESSMENT: ACTIVITIES ARE NOT PREVENTED

## 2023-03-03 NOTE — PROGRESS NOTES
Patient came from [facility] and just arrived in room 1525. Dr. Busch Downy aware. Pt a&ox4. Denied pain or nausea. Ambulated in room with steady gait. Voiding x1 continent. Encouraged deep breathing. Continue on 2 L/min O2 via N/C. Iv LR 100ml/hr administrated as order thru R AC iv access. VSS.

## 2023-03-03 NOTE — ED NOTES
Patient alert and oriented x4 and denies any shortness of breath. Patients respirations are even and unlabored and is at 95% on 2L nasal cannula. Patients two children are at bedside. Patient was successfully transferred to EMS stretcher and wheeled to the exit.      Cindy Aparicio RN  03/03/23 2353

## 2023-03-03 NOTE — PROGRESS NOTES
Pharmacist Review and Automatic Dose Adjustment of Prophylactic Enoxaparin    The reviewing pharmacist has made an adjustment to the ordered enoxaparin dose or converted to UFH per the approved [facility]  protocol and table as defined below. Plan / Rationale: Based upon the patient's weight and renal function, the ordered dose of 40 mg QDAY has been converted to 30 mg BID. Thank you,  Gulshan Loomis, [facility]  3/3/2023, 4:40 AM      Booker Haddad is a 63 y.o. female. Recent Labs     03/02/23  1330   CREATININE 0.7       Estimated Creatinine Clearance: 114 mL/min (based on SCr of 0.7 mg/dL). Recent Labs     03/02/23  1244   HGB 15.0   HCT 45.8        No results for input(s): INR in the last 72 hours.     Height:   Ht Readings from Last 1 Encounters:   03/03/23 5' 5\" (1.651 m)     Weight:  Wt Readings from Last 1 Encounters:   03/03/23 277 lb 5.4 oz (125.8 kg)

## 2023-03-03 NOTE — H&P
Hospital Medicine History & Physical      PCP: Portillo Sampson MD    Date of Admission: 3/3/2023    Date of Service: Pt seen/examined on 3/3/2023 and Admitted to Inpatient with expected LOS greater than two midnights due to medical therapy. Chief Complaint: Abdominal pain      History Of Present Illness:    63 y.o. female who presents from [facility] ED with complaints of abdominal pain and for general surgery consult. The patient just has had endoscopic surgery done by Dr. Brandon Carrasquillo 3/1/2023 at [facility]. According to the patient the surgery was attempted was unable to do the surgery because of her hernia. Patient did undergo a laparoscopic surgery initially patient has presented to ED with increasing abdominal pain and outpatient pain medications helping. Patient also complains that she has not been passing flatus and has not had a bowel movement for days. Denies any nausea or vomiting    Past Medical History:          Diagnosis Date    Arthritis     Hypertension     Movement disorder        Past Surgical History:          Procedure Laterality Date    APPENDECTOMY      HERNIA REPAIR      HYSTERECTOMY (CERVIX STATUS UNKNOWN)         Medications Prior to Admission:      Prior to Admission medications    Medication Sig Start Date End Date Taking?  Authorizing Provider   cetirizine (ZYRTEC) 10 MG tablet Take 10 mg by mouth at bedtime    Historical Provider, MD   Multiple Vitamins-Minerals (WOMENS MULTIVITAMIN) TABS Take by mouth daily    Historical Provider, MD   ondansetron (ZOFRAN-ODT) 4 MG disintegrating tablet Take 4 mg by mouth every 8 hours as needed for Nausea or Vomiting    Historical Provider, MD   docusate sodium (COLACE) 100 MG capsule Take 100 mg by mouth 2 times daily    Historical Provider, MD   promethazine (PHENERGAN) 25 MG tablet Take 25 mg by mouth every 6 hours as needed for Nausea    Historical Provider, MD   acetaminophen (TYLENOL) 325 MG tablet Take 650 mg by mouth every 6 hours as needed for Pain    Historical Provider, MD   amLODIPine (NORVASC) 5 MG tablet TK 1 T PO  QD 10/31/17   Historical Provider, MD   losartan (COZAAR) 100 MG tablet Take 100 mg by mouth nightly 11/1/17   Historical Provider, MD   celecoxib (CELEBREX) 200 MG capsule Take 200 mg by mouth    Historical Provider, MD   aspirin 81 MG chewable tablet Take 81 mg by mouth daily  Patient not taking: Reported on 3/2/2023    Historical Provider, MD   ibuprofen (ADVIL;MOTRIN) 200 MG tablet Take 200 mg by mouth every 6 hours as needed for Pain  Patient not taking: Reported on 3/2/2023    Historical Provider, MD   naproxen (EC-NAPROSYN) 500 MG EC tablet Take 1 tablet by mouth every 12 hours as needed for Pain  Patient not taking: Reported on 3/2/2023 6/20/17 6/20/18  Mikey Marquez MD       Allergies:  Sulfa antibiotics    Social History:      The patient currently lives at home    TOBACCO:   reports that she has never smoked. She has never used smokeless tobacco.  ETOH:   reports no history of alcohol use. E-cigarette/Vaping       Questions Responses    E-cigarette/Vaping Use     Start Date     Passive Exposure     Quit Date     Counseling Given     Comments               Family History:    Reviewed and negative in regards to presenting illness/complaint. Problem Relation Age of Onset    Cancer Father     Heart Disease Mother     High Blood Pressure Mother        REVIEW OF SYSTEMS COMPLETED:   Pertinent positives as noted in the HPI. All other systems reviewed and negative. PHYSICAL EXAM PERFORMED:    BP (!) 147/77   Pulse 63   Temp 97.8 °F (36.6 °C) (Oral)   Resp 18   SpO2 94%     General appearance:  No apparent distress, appears stated age and cooperative. Morbidly obese  HEENT:  Normal cephalic, atraumatic without obvious deformity. Pupils equal, round, and reactive to light. Extra ocular muscles intact. Conjunctivae/corneas clear. Neck: Supple, with full range of motion.  No jugular venous distention. Trachea midline. Respiratory:  Normal respiratory effort. Clear to auscultation, bilaterally without Rales/Wheezes/Rhonchi. Cardiovascular:  Regular rate and rhythm with normal S1/S2 without murmurs, rubs or gallops. Abdomen: Soft, tender to palpation with some guarding, but no rigidity or rebound, large ventral hernia, with decreased bowel sounds. Musculoskeletal:  No clubbing, cyanosis or edema bilaterally. Full range of motion without deformity. Skin: Skin color, texture, turgor normal.  No rashes or lesions. Neurologic:  Neurovascularly intact without any focal sensory/motor deficits. Cranial nerves: II-XII intact, grossly non-focal.  Psychiatric:  Alert and oriented, thought content appropriate, normal insight  Capillary Refill: Brisk,3 seconds, normal  Peripheral Pulses: +2 palpable, equal bilaterally       Labs:     Recent Labs     03/02/23  1244   WBC 10.2   HGB 15.0   HCT 45.8        Recent Labs     03/02/23  1330      K 3.9      CO2 27   BUN 15   CREATININE 0.7   CALCIUM 9.4     Recent Labs     03/02/23  1330   AST 15   ALT 16   BILITOT 0.6   ALKPHOS 119     No results for input(s): INR in the last 72 hours. No results for input(s): Kathyrn Belch in the last 72 hours. Urinalysis:      Lab Results   Component Value Date/Time    NITRU Negative 03/02/2023 01:39 PM    WBCUA 12 03/02/2023 01:39 PM    BACTERIA None Seen 03/02/2023 01:39 PM    RBCUA 26 03/02/2023 01:39 PM    BLOODU LARGE 03/02/2023 01:39 PM    SPECGRAV 1.020 03/02/2023 01:39 PM    GLUCOSEU Negative 03/02/2023 01:39 PM       Radiology:     CXR: I have reviewed the CXR with the following interpretation: NA  EKG:  I have reviewed the EKG with the following interpretation: NA    CT abdomen and pelvis with IV contrast  1. Large ventral abdominal wall hernia just right of the midline with a 7.7   cm neck.   Multiple loops of small bowel likely representing mostly ileum and   some jejunum have passed into the hernia sac. A few of these loops are   dilated measuring up to 3.3 cm in diameter. This is compatible with small   bowel obstruction. 2.  There is a large amount of subcutaneous air within the ventral abdomen   tracking the right inguinal region and along the right flank. This could be   due to recent surgical intervention. If no surgical intervention then this   is concerning for possible enterocutaneous fistula     Consults:    IP CONSULT TO GENERAL SURGERY    ASSESSMENT:    Active Hospital Problems    Diagnosis Date Noted    SBO (small bowel obstruction) ([facility] [address] [R90.677] 03/03/2023     Priority: Medium   #Morbid obesity with BMI of 46  #Essential hypertension    PLAN:  -N.p.o.  -Gentle IV hydration  -Pain relief  -General surgery consult  -Monitor electrolytes and renal function  -Supportive therapy    DVT Prophylaxis: Lovenox  Diet: Diet NPO  Code Status: Full Code    PT/OT Eval Status: As tolerated    Dispo -GMF with telemetry       Landy Huntley MD    Thank you Junior Zavala MD for the opportunity to be involved in this patient's care. If you have any questions or concerns please feel free to contact me at [phone].

## 2023-03-03 NOTE — CARE COORDINATION
3:08 PM  Upon review of pts chart, pt is from home, IPTA, no current home services. Pt denies a need for any. Pt has transport at time of dc. CM will sign off at this time. Please consult CM/SW if any dcp needs arise.     Electronically signed by Saeid Johnson RN, CM on 3/3/2023 at 3:08 PM.  Phone: [phone]  Fax: [phone]

## 2023-03-03 NOTE — CONSULTS
Bariatric Surgery   Resident Consult Note    Reason for Consult: SBO    History of Present Illness:   Seamus Galarza is a 63 y.o. morbidly obese female with Hx of hypertension who presented as a transfer from [place] for abdominal pain. Per chart review, patient had a procedure done at [facility] on 03/01 by Dr. Duke Lopez. However, the procedure was not completed due to a ventral hernia. She has not been passing gas or had BM. She denies nausea or vomiting. She has a surgical history significant for appendectomy, hernia repair and hysterectomy. Past Medical History:        Diagnosis Date    Arthritis     Hypertension     Movement disorder        Past Surgical History:        Procedure Laterality Date    APPENDECTOMY      HERNIA REPAIR      HYSTERECTOMY (CERVIX STATUS UNKNOWN)         Allergies:  Sulfa antibiotics    Medications:   Home Meds  No current facility-administered medications on file prior to encounter.      Current Outpatient Medications on File Prior to Encounter   Medication Sig Dispense Refill    cetirizine (ZYRTEC) 10 MG tablet Take 10 mg by mouth at bedtime      Multiple Vitamins-Minerals (WOMENS MULTIVITAMIN) TABS Take by mouth daily      ondansetron (ZOFRAN-ODT) 4 MG disintegrating tablet Take 4 mg by mouth every 8 hours as needed for Nausea or Vomiting (Patient not taking: Reported on 3/3/2023)      docusate sodium (COLACE) 100 MG capsule Take 100 mg by mouth 2 times daily (Patient not taking: Reported on 3/3/2023)      promethazine (PHENERGAN) 25 MG tablet Take 25 mg by mouth every 6 hours as needed for Nausea (Patient not taking: Reported on 3/3/2023)      acetaminophen (TYLENOL) 325 MG tablet Take 650 mg by mouth every 6 hours as needed for Pain      amLODIPine (NORVASC) 5 MG tablet TK 1 T PO  QD  5    losartan (COZAAR) 100 MG tablet Take 100 mg by mouth nightly  6    celecoxib (CELEBREX) 200 MG capsule Take 200 mg by mouth      aspirin 81 MG chewable tablet Take 81 mg by mouth daily (Patient not taking: No sig reported)      ibuprofen (ADVIL;MOTRIN) 200 MG tablet Take 200 mg by mouth every 6 hours as needed for Pain (Patient not taking: No sig reported)      naproxen (EC-NAPROSYN) 500 MG EC tablet Take 1 tablet by mouth every 12 hours as needed for Pain (Patient not taking: No sig reported) 20 tablet 3       Current Meds  sodium chloride flush 0.9 % injection 5-40 mL, 2 times per day  sodium chloride flush 0.9 % injection 5-40 mL, PRN  0.9 % sodium chloride infusion, PRN  enoxaparin Sodium (LOVENOX) injection 30 mg, BID  ondansetron (ZOFRAN-ODT) disintegrating tablet 4 mg, Q8H PRN   Or  ondansetron (ZOFRAN) injection 4 mg, Q6H PRN  polyethylene glycol (GLYCOLAX) packet 17 g, Daily PRN  acetaminophen (TYLENOL) tablet 650 mg, Q6H PRN   Or  acetaminophen (TYLENOL) suppository 650 mg, Q6H PRN  lactated ringers IV soln infusion, Continuous  HYDROmorphone (DILAUDID) injection 0.5 mg, Q3H PRN      Family History:   Family History   Problem Relation Age of Onset    Cancer Father     Heart Disease Mother     High Blood Pressure Mother        Social History:   TOBACCO:   reports that she has never smoked. She has never used smokeless tobacco.  ETOH:   reports no history of alcohol use. DRUGS:   reports no history of drug use. Review of Systems:   A 14 point review of systems was conducted, significant findings as noted in HPI. All other systems negative.      Physical exam:    Vitals:    03/03/23 0412 03/03/23 0415 03/03/23 0710 03/03/23 0821   BP: (!) 147/77  (!) 157/83    Pulse: 63  75    Resp:   16 18   Temp:   98.8 °F (37.1 °C)    TempSrc:   Oral    SpO2:   95%    Weight:  277 lb 5.4 oz (125.8 kg)     Height:  5' 5\" (1.651 m)         General appearance: alert, no acute distress, grooming appropriate  Eyes: No scleral icterus, EOM grossly intact  Neck: trachea midline, no JVD, no lymphadenopathy, neck supple  Chest/Lungs: CTAB, no crackles/rales, wheezes/rhonchi, normal effort with no accessory muscle use on 1L NC  Cardiovascular: RRR, no murmurs/gallops/rubs, S1 and S2 noted, well perfused  Abdomen: obese, partially reducible incisional hernia, with mild tenderness, moderately distended. Non peritoneal  Skin: warm and dry, no rashes  Extremities: no edema, no cyanosis  Genitourinary: Grossly normal  Neuro: A&Ox3, no focal deficits, sensation intact    Labs:    CBC:   Recent Labs     03/02/23  1244   WBC 10.2   HGB 15.0   HCT 45.8   MCV 89.8        BMP:   Recent Labs     03/02/23  1330 03/03/23  0547    140   K 3.9 4.0    103   CO2 27 28   BUN 15 18   CREATININE 0.7 0.7     PT/INR: No results for input(s): PROTIME, INR in the last 72 hours. APTT: No results for input(s): APTT in the last 72 hours. Liver Profile:   Lab Results   Component Value Date/Time    AST 15 03/02/2023 01:30 PM    ALT 16 03/02/2023 01:30 PM    BILIDIR 0.3 05/14/2013 02:46 PM    BILITOT 0.6 03/02/2023 01:30 PM    ALKPHOS 119 03/02/2023 01:30 PM   No results found for: CHOL, HDL, TRIG  UA:   Lab Results   Component Value Date/Time    COLORU Yellow 03/02/2023 01:39 PM    PHUR 5.5 03/02/2023 01:39 PM    WBCUA 12 03/02/2023 01:39 PM    RBCUA 26 03/02/2023 01:39 PM    BACTERIA None Seen 03/02/2023 01:39 PM    CLARITYU CLOUDY 03/02/2023 01:39 PM    SPECGRAV 1.020 03/02/2023 01:39 PM    LEUKOCYTESUR SMALL 03/02/2023 01:39 PM    UROBILINOGEN 1.0 03/02/2023 01:39 PM    BILIRUBINUR Negative 03/02/2023 01:39 PM    BLOODU LARGE 03/02/2023 01:39 PM    GLUCOSEU Negative 03/02/2023 01:39 PM       Imaging:   CT 3/2  Impression   1. Large ventral abdominal wall hernia just right of the midline with a 7.7   cm neck. Multiple loops of small bowel likely representing mostly ileum and   some jejunum have passed into the hernia sac. A few of these loops are   dilated measuring up to 3.3 cm in diameter. This is compatible with small   bowel obstruction.        2.  There is a large amount of subcutaneous air within the ventral abdomen   tracking the right inguinal region and along the right flank. This could be   due to recent surgical intervention. If no surgical intervention then this   is concerning for possible enterocutaneous fistula         Assessment/Plan: This is a 63 y.o. female with Hx of sleeve gastrectomy s/p aborted RNY conversion. CT scan without PO contrast with dilated loops of small  bowel concerning for small bowel obstruction.    - SBFT  - Low probability of strangulated hernia with wide neck, will evaluate for high grade/complete SBO  - NPO, IVF  - will continue to monitor closely    Arti Sen MD  03/03/23  9:23 AM      Attending Addendum:    I have reviewed the notes, assessments, and/or procedures performed by the above author of the note,  In general I concur with her/his documentation of Yun Lopez with the following additions or corrections:    Pt is POD# 2 S/P diag lap lysis of adhesions    Concerns for pSBOvs ileus of this large periumbilical recurrent incisional hernia. Admit for obs,   NPO  IVF    Order SBFT will attempt conservative, non operative management and monitor closely for clinical changes.      Further recs to follow     Delmre Tyler DO 3/4/2023 1:59 PM   Call my cell with any questions or concerns, [phone]

## 2023-03-03 NOTE — PROGRESS NOTES
4 Eyes Admission Assessment     I agree as the admission nurse that 2 RN's have performed a thorough Head to Toe Skin Assessment on the patient. ALL assessment sites listed below have been assessed on admission. Areas assessed by both nurses:   [x]   Head, Face, and Ears   [x]   Shoulders, Back, and Chest  [x]   Arms, Elbows, and Hands   [x]   Coccyx, Sacrum, and Ischium  [x]   Legs, Feet, and Heels        Does the Patient have Skin Breakdown?   No         Tip Prevention initiated:  No   Wound Care Orders initiated:  No      [facility] nurse consulted for Pressure Injury (Stage 3,4, Unstageable, DTI, NWPT, and Complex wounds) or Tip score 18 or lower:  No      Nurse 1 eSignature: Electronically signed by Cassie Roberts RN on 3/3/23 at 7:43 AM EST    **SHARE this note so that the co-signing nurse is able to place an eSignature**    Nurse 2 eSignature: Electronically signed by Gilberto Dobson RN on 3/3/23 at 7:45 AM EST

## 2023-03-03 NOTE — PLAN OF CARE
Problem: Discharge Planning  Goal: Discharge to home or other facility with appropriate resources  Outcome: Progressing     Problem: Safety - Adult  Goal: Free from fall injury  Outcome: Progressing     Problem: ABCDS Injury Assessment  Goal: Absence of physical injury  Outcome: Progressing     Problem: Pain  Goal: Verbalizes/displays adequate comfort level or baseline comfort level  Outcome: Progressing  Flowsheets (Taken 3/3/2023 2638)  Verbalizes/displays adequate comfort level or baseline comfort level:   Encourage patient to monitor pain and request assistance   Assess pain using appropriate pain scale   Administer analgesics based on type and severity of pain and evaluate response  Note: Patient requiring PRN Dilaudid to control abdominal pain.

## 2023-03-04 ENCOUNTER — ANESTHESIA EVENT (OUTPATIENT)
Dept: OPERATING ROOM | Age: 63
End: 2023-03-04
Payer: COMMERCIAL

## 2023-03-04 ENCOUNTER — APPOINTMENT (OUTPATIENT)
Dept: GENERAL RADIOLOGY | Age: 63
End: 2023-03-04
Attending: INTERNAL MEDICINE
Payer: COMMERCIAL

## 2023-03-04 ENCOUNTER — ANESTHESIA (OUTPATIENT)
Dept: OPERATING ROOM | Age: 63
End: 2023-03-04
Payer: COMMERCIAL

## 2023-03-04 LAB
ALBUMIN SERPL-MCNC: 3.1 G/DL (ref 3.4–5)
ALBUMIN SERPL-MCNC: 3.5 G/DL (ref 3.4–5)
ANION GAP SERPL CALCULATED.3IONS-SCNC: 11 MMOL/L (ref 3–16)
ANION GAP SERPL CALCULATED.3IONS-SCNC: 9 MMOL/L (ref 3–16)
BASOPHILS ABSOLUTE: 0 K/UL (ref 0–0.2)
BASOPHILS ABSOLUTE: 0 K/UL (ref 0–0.2)
BASOPHILS RELATIVE PERCENT: 0 %
BASOPHILS RELATIVE PERCENT: 0.5 %
BUN BLDV-MCNC: 17 MG/DL (ref 7–20)
BUN BLDV-MCNC: 19 MG/DL (ref 7–20)
CALCIUM SERPL-MCNC: 8 MG/DL (ref 8.3–10.6)
CALCIUM SERPL-MCNC: 8.7 MG/DL (ref 8.3–10.6)
CHLORIDE BLD-SCNC: 100 MMOL/L (ref 99–110)
CHLORIDE BLD-SCNC: 102 MMOL/L (ref 99–110)
CO2: 23 MMOL/L (ref 21–32)
CO2: 27 MMOL/L (ref 21–32)
CREAT SERPL-MCNC: 0.6 MG/DL (ref 0.6–1.2)
CREAT SERPL-MCNC: 0.6 MG/DL (ref 0.6–1.2)
EOSINOPHILS ABSOLUTE: 0 K/UL (ref 0–0.6)
EOSINOPHILS ABSOLUTE: 0.1 K/UL (ref 0–0.6)
EOSINOPHILS RELATIVE PERCENT: 0.1 %
EOSINOPHILS RELATIVE PERCENT: 0.9 %
GFR SERPL CREATININE-BSD FRML MDRD: >60 ML/MIN/{1.73_M2}
GFR SERPL CREATININE-BSD FRML MDRD: >60 ML/MIN/{1.73_M2}
GLUCOSE BLD-MCNC: 110 MG/DL (ref 70–99)
GLUCOSE BLD-MCNC: 147 MG/DL (ref 70–99)
HCT VFR BLD CALC: 42.5 % (ref 36–48)
HCT VFR BLD CALC: 43.1 % (ref 36–48)
HEMOGLOBIN: 14 G/DL (ref 12–16)
HEMOGLOBIN: 14.4 G/DL (ref 12–16)
LYMPHOCYTES ABSOLUTE: 0.5 K/UL (ref 1–5.1)
LYMPHOCYTES ABSOLUTE: 1.4 K/UL (ref 1–5.1)
LYMPHOCYTES RELATIVE PERCENT: 21.3 %
LYMPHOCYTES RELATIVE PERCENT: 5.7 %
MAGNESIUM: 1.8 MG/DL (ref 1.8–2.4)
MAGNESIUM: 2.1 MG/DL (ref 1.8–2.4)
MCH RBC QN AUTO: 30.1 PG (ref 26–34)
MCH RBC QN AUTO: 30.7 PG (ref 26–34)
MCHC RBC AUTO-ENTMCNC: 33 G/DL (ref 31–36)
MCHC RBC AUTO-ENTMCNC: 33.5 G/DL (ref 31–36)
MCV RBC AUTO: 91.1 FL (ref 80–100)
MCV RBC AUTO: 91.8 FL (ref 80–100)
MONOCYTES ABSOLUTE: 0.6 K/UL (ref 0–1.3)
MONOCYTES ABSOLUTE: 0.9 K/UL (ref 0–1.3)
MONOCYTES RELATIVE PERCENT: 10.2 %
MONOCYTES RELATIVE PERCENT: 8.7 %
NEUTROPHILS ABSOLUTE: 4.5 K/UL (ref 1.7–7.7)
NEUTROPHILS ABSOLUTE: 7.8 K/UL (ref 1.7–7.7)
NEUTROPHILS RELATIVE PERCENT: 68.6 %
NEUTROPHILS RELATIVE PERCENT: 84 %
PDW BLD-RTO: 13.3 % (ref 12.4–15.4)
PDW BLD-RTO: 13.5 % (ref 12.4–15.4)
PHOSPHORUS: 3.1 MG/DL (ref 2.5–4.9)
PHOSPHORUS: 4.1 MG/DL (ref 2.5–4.9)
PLATELET # BLD: 131 K/UL (ref 135–450)
PLATELET # BLD: 172 K/UL (ref 135–450)
PMV BLD AUTO: 10.7 FL (ref 5–10.5)
PMV BLD AUTO: 9.9 FL (ref 5–10.5)
POTASSIUM SERPL-SCNC: 3.6 MMOL/L (ref 3.5–5.1)
POTASSIUM SERPL-SCNC: 5.3 MMOL/L (ref 3.5–5.1)
RBC # BLD: 4.66 M/UL (ref 4–5.2)
RBC # BLD: 4.69 M/UL (ref 4–5.2)
SODIUM BLD-SCNC: 134 MMOL/L (ref 136–145)
SODIUM BLD-SCNC: 138 MMOL/L (ref 136–145)
WBC # BLD: 6.6 K/UL (ref 4–11)
WBC # BLD: 9.3 K/UL (ref 4–11)

## 2023-03-04 PROCEDURE — 2580000003 HC RX 258: Performed by: STUDENT IN AN ORGANIZED HEALTH CARE EDUCATION/TRAINING PROGRAM

## 2023-03-04 PROCEDURE — 3700000000 HC ANESTHESIA ATTENDED CARE: Performed by: SURGERY

## 2023-03-04 PROCEDURE — 0DB80ZZ EXCISION OF SMALL INTESTINE, OPEN APPROACH: ICD-10-PCS | Performed by: SURGERY

## 2023-03-04 PROCEDURE — 51798 US URINE CAPACITY MEASURE: CPT

## 2023-03-04 PROCEDURE — 6360000002 HC RX W HCPCS: Performed by: INTERNAL MEDICINE

## 2023-03-04 PROCEDURE — 2720000010 HC SURG SUPPLY STERILE: Performed by: SURGERY

## 2023-03-04 PROCEDURE — A4217 STERILE WATER/SALINE, 500 ML: HCPCS | Performed by: SURGERY

## 2023-03-04 PROCEDURE — 6360000002 HC RX W HCPCS: Performed by: SURGERY

## 2023-03-04 PROCEDURE — 7100000001 HC PACU RECOVERY - ADDTL 15 MIN: Performed by: SURGERY

## 2023-03-04 PROCEDURE — 1200000000 HC SEMI PRIVATE

## 2023-03-04 PROCEDURE — 3700000001 HC ADD 15 MINUTES (ANESTHESIA): Performed by: SURGERY

## 2023-03-04 PROCEDURE — 2500000003 HC RX 250 WO HCPCS: Performed by: INTERNAL MEDICINE

## 2023-03-04 PROCEDURE — 7100000000 HC PACU RECOVERY - FIRST 15 MIN: Performed by: SURGERY

## 2023-03-04 PROCEDURE — 6370000000 HC RX 637 (ALT 250 FOR IP): Performed by: INTERNAL MEDICINE

## 2023-03-04 PROCEDURE — 2580000003 HC RX 258: Performed by: SURGERY

## 2023-03-04 PROCEDURE — 0DN80ZZ RELEASE SMALL INTESTINE, OPEN APPROACH: ICD-10-PCS | Performed by: SURGERY

## 2023-03-04 PROCEDURE — 0JB80ZZ EXCISION OF ABDOMEN SUBCUTANEOUS TISSUE AND FASCIA, OPEN APPROACH: ICD-10-PCS | Performed by: SURGERY

## 2023-03-04 PROCEDURE — C1781 MESH (IMPLANTABLE): HCPCS | Performed by: SURGERY

## 2023-03-04 PROCEDURE — C1713 ANCHOR/SCREW BN/BN,TIS/BN: HCPCS | Performed by: SURGERY

## 2023-03-04 PROCEDURE — 2709999900 HC NON-CHARGEABLE SUPPLY: Performed by: SURGERY

## 2023-03-04 PROCEDURE — 88302 TISSUE EXAM BY PATHOLOGIST: CPT

## 2023-03-04 PROCEDURE — 94660 CPAP INITIATION&MGMT: CPT

## 2023-03-04 PROCEDURE — 80069 RENAL FUNCTION PANEL: CPT

## 2023-03-04 PROCEDURE — 83735 ASSAY OF MAGNESIUM: CPT

## 2023-03-04 PROCEDURE — 74018 RADEX ABDOMEN 1 VIEW: CPT

## 2023-03-04 PROCEDURE — 3600000004 HC SURGERY LEVEL 4 BASE: Performed by: SURGERY

## 2023-03-04 PROCEDURE — 0WUF0JZ SUPPLEMENT ABDOMINAL WALL WITH SYNTHETIC SUBSTITUTE, OPEN APPROACH: ICD-10-PCS | Performed by: SURGERY

## 2023-03-04 PROCEDURE — 6370000000 HC RX 637 (ALT 250 FOR IP): Performed by: SURGERY

## 2023-03-04 PROCEDURE — 6360000002 HC RX W HCPCS: Performed by: FAMILY MEDICINE

## 2023-03-04 PROCEDURE — 0DBU0ZZ EXCISION OF OMENTUM, OPEN APPROACH: ICD-10-PCS | Performed by: SURGERY

## 2023-03-04 PROCEDURE — 3600000014 HC SURGERY LEVEL 4 ADDTL 15MIN: Performed by: SURGERY

## 2023-03-04 PROCEDURE — C1769 GUIDE WIRE: HCPCS | Performed by: SURGERY

## 2023-03-04 PROCEDURE — 88307 TISSUE EXAM BY PATHOLOGIST: CPT

## 2023-03-04 PROCEDURE — 36415 COLL VENOUS BLD VENIPUNCTURE: CPT

## 2023-03-04 PROCEDURE — 85025 COMPLETE CBC W/AUTO DIFF WBC: CPT

## 2023-03-04 DEVICE — SCREW BNE L46MM DIA4MM CANC TI SELF DRL ST CANN LOK SHT: Type: IMPLANTABLE DEVICE | Site: ANKLE | Status: FUNCTIONAL

## 2023-03-04 DEVICE — SCREW BNE L14MM DIA2.7MM CORT TI ST NONCANNULATED LOK FULL: Type: IMPLANTABLE DEVICE | Site: ANKLE | Status: FUNCTIONAL

## 2023-03-04 DEVICE — MESH SURG W6XL8IN RECT FULL REABSORBABLE FOR SFT TISS RECON: Type: IMPLANTABLE DEVICE | Status: FUNCTIONAL

## 2023-03-04 DEVICE — IMPLANTABLE DEVICE: Type: IMPLANTABLE DEVICE | Site: ANKLE | Status: FUNCTIONAL

## 2023-03-04 DEVICE — SCREW BNE L14MM DIA3.5MM TI ST LOK FULL THRD T15 STARDRV: Type: IMPLANTABLE DEVICE | Site: ANKLE | Status: FUNCTIONAL

## 2023-03-04 DEVICE — SCREW BNE L16MM DIA2.7MM CORT TI ST NONCANNULATED LOK FULL: Type: IMPLANTABLE DEVICE | Site: ANKLE | Status: FUNCTIONAL

## 2023-03-04 DEVICE — SCREW BNE L16MM DIA2.7MM CORT TI ST FULL THRD SPHR HD SM: Type: IMPLANTABLE DEVICE | Site: ANKLE | Status: FUNCTIONAL

## 2023-03-04 DEVICE — SCREW BNE L16MM DIA3.5MM CORT TI ST NONCANNULATED LOK FULL: Type: IMPLANTABLE DEVICE | Site: ANKLE | Status: FUNCTIONAL

## 2023-03-04 RX ORDER — ROCURONIUM BROMIDE 10 MG/ML
INJECTION, SOLUTION INTRAVENOUS PRN
Status: DISCONTINUED | OUTPATIENT
Start: 2023-03-04 | End: 2023-03-04 | Stop reason: SDUPTHER

## 2023-03-04 RX ORDER — SUCCINYLCHOLINE CHLORIDE 20 MG/ML
INJECTION INTRAMUSCULAR; INTRAVENOUS PRN
Status: DISCONTINUED | OUTPATIENT
Start: 2023-03-04 | End: 2023-03-04 | Stop reason: SDUPTHER

## 2023-03-04 RX ORDER — IPRATROPIUM BROMIDE AND ALBUTEROL SULFATE 2.5; .5 MG/3ML; MG/3ML
1 SOLUTION RESPIRATORY (INHALATION)
Status: DISCONTINUED | OUTPATIENT
Start: 2023-03-04 | End: 2023-03-04 | Stop reason: HOSPADM

## 2023-03-04 RX ORDER — PROPOFOL 10 MG/ML
INJECTION, EMULSION INTRAVENOUS PRN
Status: DISCONTINUED | OUTPATIENT
Start: 2023-03-04 | End: 2023-03-04 | Stop reason: SDUPTHER

## 2023-03-04 RX ORDER — LIDOCAINE HYDROCHLORIDE 20 MG/ML
INJECTION, SOLUTION EPIDURAL; INFILTRATION; INTRACAUDAL; PERINEURAL PRN
Status: DISCONTINUED | OUTPATIENT
Start: 2023-03-04 | End: 2023-03-04 | Stop reason: SDUPTHER

## 2023-03-04 RX ORDER — ACETAMINOPHEN 650 MG/1
650 SUPPOSITORY RECTAL
Status: DISCONTINUED | OUTPATIENT
Start: 2023-03-04 | End: 2023-03-04 | Stop reason: HOSPADM

## 2023-03-04 RX ORDER — PROCHLORPERAZINE EDISYLATE 5 MG/ML
5 INJECTION INTRAMUSCULAR; INTRAVENOUS
Status: DISCONTINUED | OUTPATIENT
Start: 2023-03-04 | End: 2023-03-04 | Stop reason: HOSPADM

## 2023-03-04 RX ORDER — LORAZEPAM 2 MG/ML
0.5 INJECTION INTRAMUSCULAR
Status: DISCONTINUED | OUTPATIENT
Start: 2023-03-04 | End: 2023-03-04 | Stop reason: HOSPADM

## 2023-03-04 RX ORDER — LABETALOL HYDROCHLORIDE 5 MG/ML
10 INJECTION, SOLUTION INTRAVENOUS
Status: DISCONTINUED | OUTPATIENT
Start: 2023-03-04 | End: 2023-03-04 | Stop reason: HOSPADM

## 2023-03-04 RX ORDER — FENTANYL CITRATE 50 UG/ML
INJECTION, SOLUTION INTRAMUSCULAR; INTRAVENOUS PRN
Status: DISCONTINUED | OUTPATIENT
Start: 2023-03-04 | End: 2023-03-04 | Stop reason: SDUPTHER

## 2023-03-04 RX ORDER — ONDANSETRON 2 MG/ML
4 INJECTION INTRAMUSCULAR; INTRAVENOUS
Status: DISCONTINUED | OUTPATIENT
Start: 2023-03-04 | End: 2023-03-04 | Stop reason: HOSPADM

## 2023-03-04 RX ORDER — CEFAZOLIN SODIUM 1 G/3ML
INJECTION, POWDER, FOR SOLUTION INTRAMUSCULAR; INTRAVENOUS PRN
Status: DISCONTINUED | OUTPATIENT
Start: 2023-03-04 | End: 2023-03-04 | Stop reason: SDUPTHER

## 2023-03-04 RX ORDER — ACETAMINOPHEN 500 MG
1000 TABLET ORAL EVERY 6 HOURS
Status: DISCONTINUED | OUTPATIENT
Start: 2023-03-04 | End: 2023-03-09 | Stop reason: HOSPADM

## 2023-03-04 RX ORDER — SODIUM CHLORIDE 0.9 % (FLUSH) 0.9 %
5-40 SYRINGE (ML) INJECTION PRN
Status: DISCONTINUED | OUTPATIENT
Start: 2023-03-04 | End: 2023-03-04 | Stop reason: HOSPADM

## 2023-03-04 RX ORDER — SODIUM CHLORIDE 0.9 % (FLUSH) 0.9 %
5-40 SYRINGE (ML) INJECTION EVERY 12 HOURS SCHEDULED
Status: DISCONTINUED | OUTPATIENT
Start: 2023-03-04 | End: 2023-03-04 | Stop reason: HOSPADM

## 2023-03-04 RX ORDER — FENTANYL CITRATE 50 UG/ML
25 INJECTION, SOLUTION INTRAMUSCULAR; INTRAVENOUS EVERY 5 MIN PRN
Status: DISCONTINUED | OUTPATIENT
Start: 2023-03-04 | End: 2023-03-04 | Stop reason: HOSPADM

## 2023-03-04 RX ORDER — MAGNESIUM SULFATE IN WATER 40 MG/ML
2000 INJECTION, SOLUTION INTRAVENOUS ONCE
Status: COMPLETED | OUTPATIENT
Start: 2023-03-04 | End: 2023-03-05

## 2023-03-04 RX ORDER — MAGNESIUM HYDROXIDE 1200 MG/15ML
LIQUID ORAL CONTINUOUS PRN
Status: DISCONTINUED | OUTPATIENT
Start: 2023-03-04 | End: 2023-03-04 | Stop reason: HOSPADM

## 2023-03-04 RX ORDER — SODIUM CHLORIDE 9 MG/ML
25 INJECTION, SOLUTION INTRAVENOUS PRN
Status: DISCONTINUED | OUTPATIENT
Start: 2023-03-04 | End: 2023-03-04 | Stop reason: HOSPADM

## 2023-03-04 RX ORDER — MEPERIDINE HYDROCHLORIDE 25 MG/ML
12.5 INJECTION INTRAMUSCULAR; INTRAVENOUS; SUBCUTANEOUS EVERY 5 MIN PRN
Status: DISCONTINUED | OUTPATIENT
Start: 2023-03-04 | End: 2023-03-04 | Stop reason: HOSPADM

## 2023-03-04 RX ADMIN — PROPOFOL 150 MG: 10 INJECTION, EMULSION INTRAVENOUS at 14:41

## 2023-03-04 RX ADMIN — ENOXAPARIN SODIUM 30 MG: 100 INJECTION SUBCUTANEOUS at 08:23

## 2023-03-04 RX ADMIN — HYDROMORPHONE HYDROCHLORIDE 2 MG: 1 INJECTION, SOLUTION INTRAMUSCULAR; INTRAVENOUS; SUBCUTANEOUS at 15:24

## 2023-03-04 RX ADMIN — FENTANYL CITRATE 50 MCG: 50 INJECTION, SOLUTION INTRAMUSCULAR; INTRAVENOUS at 14:47

## 2023-03-04 RX ADMIN — HYDROMORPHONE HYDROCHLORIDE 0.5 MG: 1 INJECTION, SOLUTION INTRAMUSCULAR; INTRAVENOUS; SUBCUTANEOUS at 22:27

## 2023-03-04 RX ADMIN — METOPROLOL TARTRATE 5 MG: 1 INJECTION, SOLUTION INTRAVENOUS at 10:48

## 2023-03-04 RX ADMIN — ONDANSETRON 4 MG: 4 TABLET, ORALLY DISINTEGRATING ORAL at 14:52

## 2023-03-04 RX ADMIN — METHOCARBAMOL 750 MG: 100 INJECTION INTRAMUSCULAR; INTRAVENOUS at 21:18

## 2023-03-04 RX ADMIN — ROCURONIUM BROMIDE 50 MG: 10 INJECTION, SOLUTION INTRAVENOUS at 14:52

## 2023-03-04 RX ADMIN — ENOXAPARIN SODIUM 30 MG: 100 INJECTION SUBCUTANEOUS at 19:37

## 2023-03-04 RX ADMIN — ONDANSETRON 4 MG: 2 INJECTION INTRAMUSCULAR; INTRAVENOUS at 13:21

## 2023-03-04 RX ADMIN — ACETAMINOPHEN 1000 MG: 500 TABLET ORAL at 22:27

## 2023-03-04 RX ADMIN — MAGNESIUM SULFATE HEPTAHYDRATE 2000 MG: 40 INJECTION, SOLUTION INTRAVENOUS at 21:24

## 2023-03-04 RX ADMIN — FENTANYL CITRATE 25 MCG: 50 INJECTION, SOLUTION INTRAMUSCULAR; INTRAVENOUS at 17:39

## 2023-03-04 RX ADMIN — LIDOCAINE HYDROCHLORIDE 100 MG: 20 INJECTION, SOLUTION EPIDURAL; INFILTRATION; INTRACAUDAL; PERINEURAL at 14:38

## 2023-03-04 RX ADMIN — SODIUM CHLORIDE, POTASSIUM CHLORIDE, SODIUM LACTATE AND CALCIUM CHLORIDE: 600; 310; 30; 20 INJECTION, SOLUTION INTRAVENOUS at 01:40

## 2023-03-04 RX ADMIN — HYDRALAZINE HYDROCHLORIDE 10 MG: 20 INJECTION INTRAMUSCULAR; INTRAVENOUS at 11:48

## 2023-03-04 RX ADMIN — HYDROMORPHONE HYDROCHLORIDE 0.5 MG: 1 INJECTION, SOLUTION INTRAMUSCULAR; INTRAVENOUS; SUBCUTANEOUS at 13:21

## 2023-03-04 RX ADMIN — SODIUM CHLORIDE, POTASSIUM CHLORIDE, SODIUM LACTATE AND CALCIUM CHLORIDE: 600; 310; 30; 20 INJECTION, SOLUTION INTRAVENOUS at 14:37

## 2023-03-04 RX ADMIN — HYDROMORPHONE HYDROCHLORIDE 0.5 MG: 1 INJECTION, SOLUTION INTRAMUSCULAR; INTRAVENOUS; SUBCUTANEOUS at 19:37

## 2023-03-04 RX ADMIN — ACETAMINOPHEN 650 MG: 325 TABLET ORAL at 01:50

## 2023-03-04 RX ADMIN — ACETAMINOPHEN 650 MG: 325 TABLET ORAL at 08:23

## 2023-03-04 RX ADMIN — ONDANSETRON 4 MG: 2 INJECTION INTRAMUSCULAR; INTRAVENOUS at 22:27

## 2023-03-04 RX ADMIN — FENTANYL CITRATE 50 MCG: 50 INJECTION, SOLUTION INTRAMUSCULAR; INTRAVENOUS at 14:38

## 2023-03-04 RX ADMIN — SUCCINYLCHOLINE CHLORIDE 120 MG: 20 INJECTION INTRAMUSCULAR; INTRAVENOUS at 14:41

## 2023-03-04 RX ADMIN — CEFAZOLIN SODIUM 3 G: 1 INJECTION, POWDER, FOR SOLUTION INTRAMUSCULAR; INTRAVENOUS at 14:49

## 2023-03-04 RX ADMIN — CEFTRIAXONE 1000 MG: 1 INJECTION, POWDER, FOR SOLUTION INTRAMUSCULAR; INTRAVENOUS at 21:54

## 2023-03-04 ASSESSMENT — PAIN - FUNCTIONAL ASSESSMENT
PAIN_FUNCTIONAL_ASSESSMENT: ACTIVITIES ARE NOT PREVENTED
PAIN_FUNCTIONAL_ASSESSMENT: PREVENTS OR INTERFERES SOME ACTIVE ACTIVITIES AND ADLS
PAIN_FUNCTIONAL_ASSESSMENT: ACTIVITIES ARE NOT PREVENTED

## 2023-03-04 ASSESSMENT — PAIN SCALES - GENERAL
PAINLEVEL_OUTOF10: 3
PAINLEVEL_OUTOF10: 3
PAINLEVEL_OUTOF10: 7
PAINLEVEL_OUTOF10: 5
PAINLEVEL_OUTOF10: 9
PAINLEVEL_OUTOF10: 10
PAINLEVEL_OUTOF10: 3
PAINLEVEL_OUTOF10: 4
PAINLEVEL_OUTOF10: 7
PAINLEVEL_OUTOF10: 3

## 2023-03-04 ASSESSMENT — PAIN DESCRIPTION - LOCATION
LOCATION: ABDOMEN
LOCATION: HEAD
LOCATION: ABDOMEN
LOCATION: HEAD
LOCATION: HEAD

## 2023-03-04 ASSESSMENT — PAIN DESCRIPTION - DESCRIPTORS
DESCRIPTORS: ACHING
DESCRIPTORS: POUNDING
DESCRIPTORS: BURNING
DESCRIPTORS: ACHING

## 2023-03-04 ASSESSMENT — PAIN DESCRIPTION - ORIENTATION
ORIENTATION: MID
ORIENTATION: MID
ORIENTATION: ANTERIOR;POSTERIOR
ORIENTATION: MID
ORIENTATION: RIGHT

## 2023-03-04 ASSESSMENT — PAIN DESCRIPTION - FREQUENCY
FREQUENCY: CONTINUOUS
FREQUENCY: INTERMITTENT

## 2023-03-04 ASSESSMENT — PAIN DESCRIPTION - ONSET: ONSET: GRADUAL

## 2023-03-04 ASSESSMENT — PAIN DESCRIPTION - PAIN TYPE
TYPE: SURGICAL PAIN
TYPE: ACUTE PAIN

## 2023-03-04 NOTE — ANESTHESIA POSTPROCEDURE EVALUATION
Department of Anesthesiology  Postprocedure Note    Patient: Anna Espitia  MRN: [id number]  YOB: 1960  Date of evaluation: 3/4/2023      Procedure Summary     Date: 03/04/23 Room / Location: [address] [place] [address]    Anesthesia Start: 9162 Anesthesia Stop: 3390    Procedure: OPEN RECURRENT INCARCERATED INCISIONAL HERNIA REPAIR WITH MESH, LYSIS OF ADHESIONS, SEGMENTAL SMALL BOWEL RESECTION,CREATION OF MYOFASCIAL FLAP 18CM X 10CM, EXCISION SUBCUTANEOUS CYST, INCISIONAL VAC PLACEMENT (Abdomen) Diagnosis:       Incisional hernia with obstruction but no gangrene      (Incisional hernia with obstruction but no gangrene [K43.0])    Surgeons: Ceferino Lao DO Responsible Provider: Kodak Rivera MD    Anesthesia Type: general ASA Status: 2          Anesthesia Type: No value filed.     Kevin Phase I:      Kevin Phase II:        Anesthesia Post Evaluation    Patient location during evaluation: PACU  Level of consciousness: awake  Complications: no  Multimodal analgesia pain management approach

## 2023-03-04 NOTE — PROGRESS NOTES
Patient admitted to PACU # 13 from OR at [address] post [address], LYSIS OF ADHESIONS, SEGMENTAL SMALL BOWEL RESECTION,CREATION OF MYOFASCIAL FLAP 18CM X 10CM, EXCISION SUBCUTANEOUS CYST, INCISIONAL VAC PLACEMENT per Dr. Chelly Lopez. Attached to PACU monitoring system and report received from anesthesia provider. Patient was reported to be hemodynamically stable during procedure. Patient drowsy on admission and denied pain. Pt has 1 old lap sites to abd with surgical glue that are from previous surgery. Pt has midline incision with preveena in place and on. Abd binder in place. Pt has ANUPAM drain present with bloody drainage. Ice pack applied. Pt on 5 L NC. Pt NSR on monitor. Pt has urinary catheter in place. Will continue to monitor.

## 2023-03-04 NOTE — ANESTHESIA PRE PROCEDURE
Department of Anesthesiology  Preprocedure Note       Name:  Clarisse Lombardi   Age:  63 y.o.  :  1960                                          MRN:  [id number]         Date:  3/4/2023      Surgeon: Marci Vincent):  Gael Holland DO    Procedure: Procedure(s):  OPEN RECURRENT INCARCERTED INCISIONAL HERNIA REPAIR WITH MESH PLACEMENT    Medications prior to admission:   Prior to Admission medications    Medication Sig Start Date End Date Taking?  Authorizing Provider   cetirizine (ZYRTEC) 10 MG tablet Take 10 mg by mouth at bedtime    Historical Provider, MD   Multiple Vitamins-Minerals (WOMENS MULTIVITAMIN) TABS Take by mouth daily    Historical Provider, MD   ondansetron (ZOFRAN-ODT) 4 MG disintegrating tablet Take 4 mg by mouth every 8 hours as needed for Nausea or Vomiting  Patient not taking: Reported on 3/3/2023    Historical Provider, MD   docusate sodium (COLACE) 100 MG capsule Take 100 mg by mouth 2 times daily  Patient not taking: Reported on 3/3/2023    Historical Provider, MD   promethazine (PHENERGAN) 25 MG tablet Take 25 mg by mouth every 6 hours as needed for Nausea  Patient not taking: Reported on 3/3/2023    Historical Provider, MD   acetaminophen (TYLENOL) 325 MG tablet Take 650 mg by mouth every 6 hours as needed for Pain    Historical Provider, MD   amLODIPine (NORVASC) 5 MG tablet TK 1 T PO  QD 10/31/17   Historical Provider, MD   losartan (COZAAR) 100 MG tablet Take 100 mg by mouth nightly 17   Historical Provider, MD   celecoxib (CELEBREX) 200 MG capsule Take 200 mg by mouth    Historical Provider, MD   aspirin 81 MG chewable tablet Take 81 mg by mouth daily  Patient not taking: No sig reported    Historical Provider, MD   ibuprofen (ADVIL;MOTRIN) 200 MG tablet Take 200 mg by mouth every 6 hours as needed for Pain  Patient not taking: No sig reported    Historical Provider, MD   naproxen (EC-NAPROSYN) 500 MG EC tablet Take 1 tablet by mouth every 12 hours as needed for Pain  Patient not taking: No sig reported 6/20/17 6/20/18  Tata Robbins MD       Current medications:    Current Facility-Administered Medications   Medication Dose Route Frequency Provider Last Rate Last Admin    HYDROmorphone (DILAUDID) injection 0.5 mg  0.5 mg IntraVENous Q4H PRN Dilip Esteban MD   0.5 mg at 03/04/23 1321    sodium chloride flush 0.9 % injection 5-40 mL  5-40 mL IntraVENous 2 times per day Josué Santos MD   10 mL at 03/03/23 0829    sodium chloride flush 0.9 % injection 5-40 mL  5-40 mL IntraVENous PRN Josee Faustin MD        0.9 % sodium chloride infusion   IntraVENous PRN Josué Santos MD        enoxaparin Sodium (LOVENOX) injection 30 mg  30 mg SubCUTAneous BID Josué Santos MD   30 mg at 03/04/23 0823    ondansetron (ZOFRAN-ODT) disintegrating tablet 4 mg  4 mg Oral Q8H PRN Josué Santos MD        Or    ondansetron (ZOFRAN) injection 4 mg  4 mg IntraVENous Q6H PRN Josué Santos MD   4 mg at 03/04/23 1321    polyethylene glycol (GLYCOLAX) packet 17 g  17 g Oral Daily PRN Josué Santos MD        acetaminophen (TYLENOL) tablet 650 mg  650 mg Oral Q6H PRN Josué Santos MD   650 mg at 03/04/23 9586    Or    acetaminophen (TYLENOL) suppository 650 mg  650 mg Rectal Q6H PRN Josué Santos MD        hydrALAZINE (APRESOLINE) injection 10 mg  10 mg IntraVENous Q6H PRN Bartolome Lux MD   10 mg at 03/04/23 1148    promethazine (PHENERGAN) injection 6.25 mg  6.25 mg IntraMUSCular Q6H PRN Bartolome Lux MD   6.25 mg at 03/03/23 1350    metoprolol (LOPRESSOR) injection 5 mg  5 mg IntraVENous Q6H PRN Bartolome Lux MD   5 mg at 03/04/23 1048    lactated ringers IV soln infusion   IntraVENous Continuous Hali Hines  mL/hr at 03/04/23 0140 New Bag at 03/04/23 0140       Allergies:     Allergies   Allergen Reactions    Sulfa Antibiotics Swelling and Itching       Problem List:    Patient Active Problem List   Diagnosis Code    Umbilical hernia O85.0    Chronic venous hypertension (idiopathic) with ulcer and inflammation of left lower extremity (HCC) I87.332, L97.929    Pain and swelling of left lower extremity M79.605, M79.89    Venous ulcer of left leg (HCC) I83.029, L97.929    SBO [facility]small bowel obstruction) ([facility] K56.609       Past Medical History:        Diagnosis Date    Arthritis     Hypertension     Movement disorder        Past Surgical History:        Procedure Laterality Date    APPENDECTOMY      HERNIA REPAIR      HYSTERECTOMY (CERVIX STATUS UNKNOWN)         Social History:    Social History     Tobacco Use    Smoking status: Never    Smokeless tobacco: Never   Substance Use Topics    Alcohol use: No                                Counseling given: Not Answered      Vital Signs (Current):   Vitals:    03/04/23 1046 03/04/23 1148 03/04/23 1351 03/04/23 1415   BP: (!) 176/82 (!) 176/87  (!) 170/81   Pulse: 75 76  83   Resp: 18  16 16   Temp: 97.8 °F (36.6 °C)   98.7 °F (37.1 °C)   TempSrc: Oral   Oral   SpO2: 92%   92%   Weight:       Height:                                                  BP Readings from Last 3 Encounters:   03/04/23 (!) 170/81   03/03/23 (!) 158/90   12/11/17 (!) 187/90       NPO Status:                                                                                 BMI:   Wt Readings from Last 3 Encounters:   03/03/23 277 lb 5.4 oz (125.8 kg)   03/02/23 276 lb (125.2 kg)   12/14/17 (!) 371 lb 0.6 oz (168.3 kg)     Body mass index is 46.15 kg/m².     CBC:   Lab Results   Component Value Date/Time    WBC 6.6 03/04/2023 05:41 AM    RBC 4.66 03/04/2023 05:41 AM    HGB 14.0 03/04/2023 05:41 AM    HCT 42.5 03/04/2023 05:41 AM    MCV 91.1 03/04/2023 05:41 AM    RDW 13.3 03/04/2023 05:41 AM     03/04/2023 05:41 AM       CMP:   Lab Results   Component Value Date/Time     03/04/2023 05:41 AM    K 3.6 03/04/2023 05:41 AM    K 4.0 03/03/2023 05:47 AM     03/04/2023 05:41 AM    CO2 27 03/04/2023 05:41 AM    BUN 19 03/04/2023 05:41 AM    CREATININE 0.6 03/04/2023 05:41 AM    AGRATIO 1.2 03/02/2023 01:30 PM    LABGLOM >60 03/04/2023 05:41 AM    GLUCOSE 110 03/04/2023 05:41 AM    PROT 8.2 03/02/2023 01:30 PM    CALCIUM 8.7 03/04/2023 05:41 AM    BILITOT 0.6 03/02/2023 01:30 PM    ALKPHOS 119 03/02/2023 01:30 PM    AST 15 03/02/2023 01:30 PM    ALT 16 03/02/2023 01:30 PM       POC Tests: No results for input(s): POCGLU, POCNA, POCK, POCCL, POCBUN, POCHEMO, POCHCT in the last 72 hours. Coags: No results found for: PROTIME, INR, APTT    HCG (If Applicable): No results found for: PREGTESTUR, PREGSERUM, HCG, HCGQUANT     ABGs: No results found for: PHART, PO2ART, TTL1PSH, FOB8LDS, BEART, J3BRDEXE     Type & Screen (If Applicable):  No results found for: LABABO, LABRH    Drug/Infectious Status (If Applicable):  No results found for: HIV, HEPCAB    COVID-19 Screening (If Applicable): No results found for: COVID19        Anesthesia Evaluation    Airway: Mallampati: II  TM distance: >3 FB   Neck ROM: full  Mouth opening: > = 3 FB   Dental:          Pulmonary:                              Cardiovascular:    (+) hypertension:,         Rhythm: regular  Rate: normal                    Neuro/Psych:               GI/Hepatic/Renal:             Endo/Other:                     Abdominal:             Vascular: Other Findings:           Anesthesia Plan      general     ASA 2       Induction: intravenous. Anesthetic plan and risks discussed with patient. Plan discussed with CRNA.                     Mariann Carreno MD   3/4/2023

## 2023-03-04 NOTE — PROGRESS NOTES
Surgery Daily Progress Note      CC: SBO    SUBJECTIVE:  Denies acute complaints. Denies N/V. Denies bowel movements. Only out of bed to bathroom. Thirsty this AM.    ROS:   A 14 point review of systems was conducted, significant findings as noted above. All other systems negative. OBJECTIVE:    PHYSICAL EXAM:  Vitals:    03/04/23 0255 03/04/23 0316 03/04/23 0327 03/04/23 0502   BP: (!) 149/74      Pulse: 68  69    Resp: 18 16  16   Temp: 98.4 °F (36.9 °C)      TempSrc: Oral      SpO2: 96%      Weight:       Height:           General appearance: alert, no acute distress, grooming appropriate  Eyes: No scleral icterus, EOM grossly intact  Neck: trachea midline, no JVD, no lymphadenopathy, neck supple  Chest/Lungs: CTAB, no crackles/rales, wheezes/rhonchi, normal effort with no accessory muscle use on 2L NC  Cardiovascular: RRR, well perfused  Abdomen: obese, partially reducible incisional hernia, with mild tenderness, moderately distended. Non peritoneal  Skin: warm and dry, no rashes  Extremities: no edema, no cyanosis  Genitourinary: Grossly normal  Neuro: A&Ox3, no focal deficits, sensation intact      ASSESSMENT & PLAN:   This is a 63 y.o. female with Hx of sleeve gastrectomy s/p aborted RNY conversion.  CT scan without PO contrast with dilated loops of small  bowel concerning for small bowel obstruction.     - SBFT with contrast to small bowel, KUB this AM with persistent dilation and contrast not progressed to colon will continue to monitor  - Repeat KUB tomorrow AM  - NPO, IVF, can have ice chips for comfort  - Continue to monitor for ROBF, ambulate today  - Further care per primary team    Randol Severs, DO  03/04/23  7:12 AM  [phone]      Attending Addendum:    I have reviewed the notes, assessments, and/or procedures performed by the above author of the note,  In general I concur with her/his documentation of Guadalupe Ny with the following additions or corrections:      History of Present Illness  Patient presents for pre-procedure evaluation today. Procedure: Open incarcerated recurrent  incisional herniorrhaphy with mesh         We have discussed the risks and benefits of the procedure multiple times. The risks of incisional hernia repair including bleeding, infection, bowel injury and recurrance were emphasized as the most common post-procedure issues. we did discuss the possibility of prolonged intubation, ICU care, ileus etc as additional possible outcomes. the patient and her family state verbal understanding. All questions were answered to the patient's satisfaction, demonstrated understanding and is ready to proceed. The surgical informed consent was signed by the patient and witnessed by me personally.         [address],  3/4/2023 1:55 PM   Call my cell with any questions or concerns, [phone]

## 2023-03-04 NOTE — OP NOTE
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, subcutaneous mass,        Procedure(s):  OPEN RECURRENT INCARCERATED INCISIONAL HERNIA REPAIR WITH MESH, LYSIS OF ADHESIONS, SEGMENTAL SMALL BOWEL RESECTION,CREATION OF MYOFASCIAL FLAP 18CM X 10CM, EXCISION SUBCUTANEOUS CYST, INCISIONAL VAC PLACEMENT    Surgeon(s):  Neal Morin DO    Assistant:   Resident: Cielo Edwards MD    Anesthesia: General    Estimated Blood Loss (mL): 277     Complications: None    Specimens:   ID Type Source Tests Collected by Time Destination   A : HERNIA [address] [place] [address],  3/4/2023 1518    B : [address] [place] [address],  3/4/2023 1532    C : SUBCUTANEOUS MASS Tissue Tissue SURGICAL PATHOLOGY Neal Morin,  3/4/2023 1627        Implants:  * No implants in log *      Drains:   Closed/Suction Drain Superior;Midline Abdomen Bulb (Active)       Urinary Catheter 03/04/23 2 Way (Active)       Findings: small bowel obstruction, recurrent incarcerated incisional hernia, meckles diverticulum and Littres hernia     Detailed Description of Procedure:       Surgeon: Neal Morin DO     Assistants: Cielo Edwards DCIARRA      Anesthesia: General endotracheal anesthesia    ASA Class: 4             Pre-operative diagnosis: incarcerated and recurrent periumbilical  incisional hernia   Patient Active Problem List   Diagnosis    Umbilical hernia    Chronic venous hypertension (idiopathic) with ulcer and inflammation of left lower extremity (HCC)    Pain and swelling of left lower extremity    Venous ulcer of left leg (HCC) SBO (small bowel obstruction) (HCC)        Post-operative diagnosis: incarcerated and recurrent periumbilical  incisional hernia, meckles diverticulum with Littres hernia, subcutaneous mass   Patient Active Problem List   Diagnosis    Umbilical hernia    Chronic venous hypertension (idiopathic) with ulcer and inflammation of left lower extremity (HCC)    Pain and swelling of left lower extremity    Venous ulcer of left leg (HCC)    SBO (small bowel obstruction) ([address]       Procedure:   #1 Open incarcerated incisional  herniorrhaphy with mesh  #2 Lysis of adhesions x 90 minutes  #3 segmental small bowel resection as such a Meckel diverticulectomy. #4 creation of 18 x 10 cm myofascial flaps   #4 placement of incisional vac      Facility: [facility]– [place]    Surgeon: Asad Monte Assist: Surgical resident, See hospital record for name and credentials    Anesthesia: General via endotracheal tube    Indications: Patient presents with a small bowel obstruction due to a  recurrent incarcerated incisional hernia. The hernia has been present for years. Recently the patient has experienced worsening symptoms associated with the hernia such as nausea vomiting, and worsening pain/tenderness. The presented for repair of the hernia. The risks, benefits and alternatives discussed with the patient in detail and the wish to proceed. Operative Report in Detail:The patient was correctly identified in the pre-operative holding area. Consent was confirmed and placed on the chart and pre-operative antibiotics were administered. The patient was taken back to the operating room and placed on the table in the supine position. General anesthesia was administered by the department of anesthesia via endotracheal tube. The abdomen was prepped and draped in the usual sterile fashion and a valdez catheter and orogastric tube were placed. A time-out was performed and all members of the team were in agreement.     First a 15 blade scalpel used to create a linear incision overlying the incisional hernia just to the right of midline. The incision carried down to the level of the fascia meticulously dissecting the hernia sac away from the tissues. The hernia sac was circumferentially dissected free of the surrounding tissues around the fascial defect protecting the underlying bowel. During this dissection a partial omentectomy was performed as most of it was densley adhered to the hernia sac and there was a rent in the omentum which had a potential to cause an internal hernia. The hernia sac and omental contents resected were set off the field and sent to pathology. This dissection was extensive and meticulous lasting 90 minutes. Once the fascial edges were cleared, we delivered the entire small bowel from the abdomen. We ran the bowel from the ligament of treitz to the terminal ileum, next we ran the colon from the cecum to the sigmoid. Interestingly at the transition point from from dilated to decompressed bowel a meckels diverticulum was identified. This was a relatively short diverticulum and had a broad base however because this appeared to be the transition point we decided to resect. A site roughly 10 cm proximally and distally was chosen, a small rent made in the mesentery. A small enterotomy made in the proximal and distal bowel and the excess fluid/chyme  was evacuated from the small bowel after milking the bowel contents to the enterotomy. An endo DILLON staple then fired across the bowel thus transecting it. The meckle diverticulectomy was then sent off the field and sent to pathology. The 2 ends of the resected small bowel were then anastomosed with an additional firing of the endo DILLON stapler thus creating a side to side anastomosis. The common enterotomy was then closed with an additional firing of the endo DILLON stapler with a blue load.  A 3-0 silk placed at the crotch to reduce tension and an additional 3-O silk used to embrocate the staple line. The mesenteric  defect was closed with 2-O silk suture. Next the abdomen was copiously irrigated until the effluent returned clear. We next turned our attention to repairing the fascial defect. The fascia was thin and weak however we did not want to place a synthetic mesh intra-abdominally for risk of infection. We decided to use a phasix mesh as an onlay to reduce mesh infection risk. In order to facilitate repair with minimal tension myofascial flaps were created with blunt dissection and the bovie electrocautery. The flaps were 18 x 10 cm in size. The fascial edges approximated with #1 Vicryl sutures in multiple figure of eight fashion. This did appear to repair the fascial defect with minimal tension. The subcutaneous tissues copiously irrigated with saline until the effluent returned clear. The phasic mesh was cut to completely cover the fascial repair with a minimum 4 cm margins on all sides. The mesh was secured with stratafix circumferentially and additional silk sutures directly over the repair. A 15- Fr. ANUPAM placed laterally and oriented into the subcutaneous cavity for the anticipated large seroma  and secured with a prolene. The deep subcutaneous tissues approximated with 2-o Vicryl in a running fashion and skin closed with staples. Abdomen was cleaned and dried, next a provena incisional vac was placed in the standard fashion. All sponge, needle and instrument counts correct at the end of the procedure. The patient tolerated the procedure well suffering no immediate post op complications. She was taken to PACU in stable condition,            Estimated Blood Loss: 200cc    Drains: 15 Fr. ANUPAM    Complication: None    Disposition: PACU - hemodynamically stable.     Electronically signed by: Dorian Marte D.O. 3/4/2023 5:37 PM       Electronically signed by Milton Bailey DO on 3/4/2023 at 5:34 PM

## 2023-03-04 NOTE — BRIEF OP NOTE
Brief Postoperative Note      Patient: Antonia Garcia  YOB: 1960  MRN: [id number]    Date of Procedure: 3/4/2023    Pre-Op Diagnosis: Incarcerated recurrent incisional hernia     Post-Op Diagnosis: Incarcerated recurrent incisional hernia, small bowel obstruction, meckel's diverticulum, subcutaneous cyst       Procedure(s):  OPEN RECURRENT INCARCERATED INCISIONAL HERNIA REPAIR WITH MESH, LYSIS OF ADHESIONS, SEGMENTAL SMALL BOWEL RESECTION,CREATION OF MYOFASCIAL FLAP 18CM X 10CM, EXCISION SUBCUTANEOUS CYST, INCISIONAL VAC PLACEMENT    Surgeon(s):  Michael Jimenes DO    Assistant:  Resident: Derek Means MD    Anesthesia: General    Estimated Blood Loss (mL): 29DZ    Complications: None    Specimens:   ID Type Source Tests Collected by Time Destination   A : HERNIA [address] [facility],  3/4/2023 1518    B : PORTION OF SMALL BOWEL Tissue [facility],  3/4/2023 1532    C : SUBCUTANEOUS MASS Tissue [facility],  3/4/2023 1627       Drains:   Closed/Suction Drain Superior;Midline Abdomen Bulb (Active)       Urinary Catheter 03/04/23 2 Way (Active)     Findings:   Extensive adhesions. Incarcerated recurrent incisional hernia containing small bowel with transitioni point immediately adjacent to a meckel's diverticulum, with inflammatory changes of the tip. Subcutaneous cystic mass incidentally discovered while clearing fascia for mesh placement -- excised and sent for pathology.      Extensive open adhesiolysis   Segmental bowel resection   Creation of myofascial flap (18cm x 10cm)  Herniorrhaphy with Phasix mesh   Placement of 15 Fr subcutaneous ANUPAM drain  Excision of excess/redundant skin   Placement of incisional wound VAC    Plan:  - Will obtain post-operative labs  - Return to the general med/surg floor following recovery in PACU  - To remain NPO with IVF while awaiting return of bowel function   - Caceres to remain in place for strict intake and output monitoring   - ANUPAM to bulb suction   - Rocephin to remain for at least 24 hours post-op       Electronically signed by Ragini Patrick MD on 3/4/2023 at 5:19 PM

## 2023-03-04 NOTE — PROGRESS NOTES
PACU Transfer Note    Vitals:    03/04/23 1821   BP:    Pulse: 80   Resp: 16   Temp:    SpO2:        In: 875 [I.V.:875]  Out: 4080 [Urine:1210; Drains:45]    Pain assessment:  none  Pain Level: 3    Report given to Receiving unit RN. Family updated personally in [address] [place] [address] and made aware pt will be returning to room.      3/4/2023 6:22 PM

## 2023-03-04 NOTE — PROGRESS NOTES
Pt A/O x4. BP was elevated and managed with Hydralazine and Metoprolol effective. Complained headache and nausea x1. Tylenol and Zofran with positive effect. Walked in room using bath room voiding. Continue IVF infusing as order. KUB done at bedside this morning. Continue on Tele monitor sinus rhythm.  BP (!) 149/74   Pulse 69   Temp 98.4 °F (36.9 °C) (Oral)   Resp 16   Ht 5' 5\" (1.651 m)   Wt 277 lb 5.4 oz (125.8 kg)   SpO2 96%   BMI 46.15 kg/m²

## 2023-03-04 NOTE — PROGRESS NOTES
Patient A/O x4, VSS other than BP- see flowsheets. Medicated with PRN BP meds x2 this shift. Up ad jody. IVF infusing per order. Pain and nausea controlled with PRN medications. No other needs at this time.  Electronically signed by Ashlee Fraga RN on 3/3/2023 at 7:44 PM

## 2023-03-04 NOTE — PLAN OF CARE
Plan of Care Update    The patient has become increasingly symptomatic now requiring pain medications and experiencing nausea. Abdominal xray reveals no progression of contrast vs this AM and worsening proximal dilation. The patient was recommended to undergo open incarcerated recurrent incisional hernia repair with mesh. She wishes to proceed following discussion of the risks, benefits, and alternatives to the procedure.      Breezy Mckinley MD, MPH  PGY-4 General Surgery  03/04/23  1:43 PM

## 2023-03-04 NOTE — PROGRESS NOTES
Hospitalist Progress Note      PCP: Yudith Prieto MD    Date of Admission: 3/3/2023    Chief Complaint: Abdominal pain    Hospital Course: 63 y.o. female who presents from [facility] ED with complaints of abdominal pain and for general surgery consult. The patient just has had endoscopic surgery done by Dr. Colette Degroot 3/1/2023 at [facility]. According to the patient the surgery was attempted was unable to do the surgery because of her hernia. Patient did undergo a laparoscopic surgery initially patient has presented to ED with increasing abdominal pain and outpatient pain medications helping. Patient also complains that she has not been passing flatus and has not had a bowel movement for days. Denies any nausea or vomiting     Subjective: seen and examined   Still npo   Pain controlled this morning        Medications:  Reviewed    Infusion Medications    sodium chloride      lactated ringers IV soln 100 mL/hr at 03/04/23 0140     Scheduled Medications    sodium chloride flush  5-40 mL IntraVENous 2 times per day    enoxaparin  30 mg SubCUTAneous BID     PRN Meds: HYDROmorphone, sodium chloride flush, sodium chloride, ondansetron **OR** ondansetron, polyethylene glycol, acetaminophen **OR** acetaminophen, hydrALAZINE, promethazine, metoprolol      Intake/Output Summary (Last 24 hours) at 3/4/2023 1428  Last data filed at 3/4/2023 1417  Gross per 24 hour   Intake 3826 ml   Output 1900 ml   Net 1926 ml       Physical Exam Performed:    BP (!) 170/81   Pulse 83   Temp 98.7 °F (37.1 °C) (Oral)   Resp 16   Ht 5' 5\" (1.651 m)   Wt 277 lb 5.4 oz (125.8 kg)   SpO2 92%   BMI 46.15 kg/m²     General appearance: No apparent distress, appears stated age and cooperative. HEENT: Pupils equal, round, and reactive to light. Conjunctivae/corneas clear. Neck: Supple, with full range of motion. No jugular venous distention. Trachea midline. Respiratory:  Normal respiratory effort.  Clear to auscultation, bilaterally without Rales/Wheezes/Rhonchi. Cardiovascular: Regular rate and rhythm with normal S1/S2 without murmurs, rubs or gallops. Abdomen: Soft, non-tender, non-distended with normal bowel sounds. Musculoskeletal: No clubbing, cyanosis or edema bilaterally. Full range of motion without deformity. Skin: Skin color, texture, turgor normal.  No rashes or lesions. Neurologic:  Neurovascularly intact without any focal sensory/motor deficits. Cranial nerves: II-XII intact, grossly non-focal.  Psychiatric: Alert and oriented, thought content appropriate, normal insight  Capillary Refill: Brisk, 3 seconds, normal   Peripheral Pulses: +2 palpable, equal bilaterally       Labs:   Recent Labs     03/02/23  1244 03/04/23  0541   WBC 10.2 6.6   HGB 15.0 14.0   HCT 45.8 42.5    172     Recent Labs     03/02/23  1330 03/03/23  0547 03/04/23  0541    140 138   K 3.9 4.0 3.6    103 102   CO2 27 28 27   BUN 15 18 19   CREATININE 0.7 0.7 0.6   CALCIUM 9.4 8.9 8.7   PHOS  --   --  3.1     Recent Labs     03/02/23  1330   AST 15   ALT 16   BILITOT 0.6   ALKPHOS 119     No results for input(s): INR in the last 72 hours. No results for input(s): Assunta Coffey in the last 72 hours. Urinalysis:      Lab Results   Component Value Date/Time    NITRU Negative 03/02/2023 01:39 PM    WBCUA 12 03/02/2023 01:39 PM    BACTERIA None Seen 03/02/2023 01:39 PM    RBCUA 26 03/02/2023 01:39 PM    BLOODU LARGE 03/02/2023 01:39 PM    SPECGRAV 1.020 03/02/2023 01:39 PM    GLUCOSEU Negative 03/02/2023 01:39 PM       Radiology:  XR ABDOMEN (KUB) (SINGLE AP VIEW)   Final Result      Persistent small bowel obstruction. FL SMALL BOWEL FOLLOW THROUGH ONLY   Final Result      Small bowel obstruction at the ventral abdominal hernia.       XR ABDOMEN (KUB) (SINGLE AP VIEW)    (Results Pending)   XR ABDOMEN (KUB) (SINGLE AP VIEW)    (Results Pending)       IP CONSULT TO GENERAL SURGERY    Assessment/Plan:    Active Hospital Problems    Diagnosis     SBO (small bowel obstruction) ([facility] [K56.609]      Priority: Medium        [facility] [K56.609] 03/03/2023       Priority: Medium   #Morbid obesity with BMI of 46  #Essential hypertension     PLAN:  -N.p.o.  -Gentle IV hydration  -Pain relief  -General surgery consult  -Monitor electrolytes and renal function  -Supportive therapy    DVT Prophylaxis: lovenox  Diet: Diet NPO Exceptions are: Ice Chips  Code Status: Full Code  PT/OT Eval Status: n/a     Dispo - inpatient        Veda Ferrell MD

## 2023-03-05 LAB
ALBUMIN SERPL-MCNC: 3.2 G/DL (ref 3.4–5)
ANION GAP SERPL CALCULATED.3IONS-SCNC: 8 MMOL/L (ref 3–16)
BASOPHILS ABSOLUTE: 0 K/UL (ref 0–0.2)
BASOPHILS RELATIVE PERCENT: 0.3 %
BUN BLDV-MCNC: 21 MG/DL (ref 7–20)
CALCIUM SERPL-MCNC: 8.2 MG/DL (ref 8.3–10.6)
CHLORIDE BLD-SCNC: 100 MMOL/L (ref 99–110)
CO2: 29 MMOL/L (ref 21–32)
CREAT SERPL-MCNC: 0.9 MG/DL (ref 0.6–1.2)
EOSINOPHILS ABSOLUTE: 0 K/UL (ref 0–0.6)
EOSINOPHILS RELATIVE PERCENT: 0.6 %
GFR SERPL CREATININE-BSD FRML MDRD: >60 ML/MIN/{1.73_M2}
GLUCOSE BLD-MCNC: 134 MG/DL (ref 70–99)
HCT VFR BLD CALC: 40.8 % (ref 36–48)
HEMOGLOBIN: 13.7 G/DL (ref 12–16)
LYMPHOCYTES ABSOLUTE: 0.9 K/UL (ref 1–5.1)
LYMPHOCYTES RELATIVE PERCENT: 11.8 %
MAGNESIUM: 2.3 MG/DL (ref 1.8–2.4)
MCH RBC QN AUTO: 30.6 PG (ref 26–34)
MCHC RBC AUTO-ENTMCNC: 33.6 G/DL (ref 31–36)
MCV RBC AUTO: 91.1 FL (ref 80–100)
MONOCYTES ABSOLUTE: 1 K/UL (ref 0–1.3)
MONOCYTES RELATIVE PERCENT: 13 %
NEUTROPHILS ABSOLUTE: 5.7 K/UL (ref 1.7–7.7)
NEUTROPHILS RELATIVE PERCENT: 74.3 %
PDW BLD-RTO: 13.3 % (ref 12.4–15.4)
PHOSPHORUS: 4.8 MG/DL (ref 2.5–4.9)
PLATELET # BLD: 174 K/UL (ref 135–450)
PMV BLD AUTO: 10.1 FL (ref 5–10.5)
POTASSIUM SERPL-SCNC: 3.7 MMOL/L (ref 3.5–5.1)
RBC # BLD: 4.47 M/UL (ref 4–5.2)
SODIUM BLD-SCNC: 137 MMOL/L (ref 136–145)
WBC # BLD: 7.7 K/UL (ref 4–11)

## 2023-03-05 PROCEDURE — 6360000002 HC RX W HCPCS: Performed by: SURGERY

## 2023-03-05 PROCEDURE — 2580000003 HC RX 258: Performed by: SURGERY

## 2023-03-05 PROCEDURE — 6360000002 HC RX W HCPCS

## 2023-03-05 PROCEDURE — 2700000000 HC OXYGEN THERAPY PER DAY

## 2023-03-05 PROCEDURE — 85025 COMPLETE CBC W/AUTO DIFF WBC: CPT

## 2023-03-05 PROCEDURE — 94761 N-INVAS EAR/PLS OXIMETRY MLT: CPT

## 2023-03-05 PROCEDURE — 80069 RENAL FUNCTION PANEL: CPT

## 2023-03-05 PROCEDURE — 1200000000 HC SEMI PRIVATE

## 2023-03-05 PROCEDURE — 83735 ASSAY OF MAGNESIUM: CPT

## 2023-03-05 PROCEDURE — 2580000003 HC RX 258: Performed by: STUDENT IN AN ORGANIZED HEALTH CARE EDUCATION/TRAINING PROGRAM

## 2023-03-05 PROCEDURE — 6370000000 HC RX 637 (ALT 250 FOR IP): Performed by: SURGERY

## 2023-03-05 PROCEDURE — 6360000002 HC RX W HCPCS: Performed by: STUDENT IN AN ORGANIZED HEALTH CARE EDUCATION/TRAINING PROGRAM

## 2023-03-05 PROCEDURE — 36415 COLL VENOUS BLD VENIPUNCTURE: CPT

## 2023-03-05 RX ORDER — SODIUM CHLORIDE, SODIUM LACTATE, POTASSIUM CHLORIDE, AND CALCIUM CHLORIDE .6; .31; .03; .02 G/100ML; G/100ML; G/100ML; G/100ML
1000 INJECTION, SOLUTION INTRAVENOUS ONCE
Status: COMPLETED | OUTPATIENT
Start: 2023-03-05 | End: 2023-03-05

## 2023-03-05 RX ORDER — SODIUM CHLORIDE, SODIUM LACTATE, POTASSIUM CHLORIDE, AND CALCIUM CHLORIDE .6; .31; .03; .02 G/100ML; G/100ML; G/100ML; G/100ML
500 INJECTION, SOLUTION INTRAVENOUS ONCE
Status: COMPLETED | OUTPATIENT
Start: 2023-03-05 | End: 2023-03-05

## 2023-03-05 RX ORDER — POTASSIUM CHLORIDE 7.45 MG/ML
10 INJECTION INTRAVENOUS
Status: COMPLETED | OUTPATIENT
Start: 2023-03-05 | End: 2023-03-05

## 2023-03-05 RX ADMIN — METHOCARBAMOL 750 MG: 100 INJECTION INTRAMUSCULAR; INTRAVENOUS at 10:54

## 2023-03-05 RX ADMIN — POTASSIUM CHLORIDE 10 MEQ: 10 INJECTION, SOLUTION INTRAVENOUS at 10:02

## 2023-03-05 RX ADMIN — SODIUM CHLORIDE, POTASSIUM CHLORIDE, SODIUM LACTATE AND CALCIUM CHLORIDE 1000 ML: 600; 310; 30; 20 INJECTION, SOLUTION INTRAVENOUS at 08:28

## 2023-03-05 RX ADMIN — HYDROMORPHONE HYDROCHLORIDE 0.5 MG: 1 INJECTION, SOLUTION INTRAMUSCULAR; INTRAVENOUS; SUBCUTANEOUS at 02:18

## 2023-03-05 RX ADMIN — HYDROMORPHONE HYDROCHLORIDE 0.5 MG: 1 INJECTION, SOLUTION INTRAMUSCULAR; INTRAVENOUS; SUBCUTANEOUS at 14:15

## 2023-03-05 RX ADMIN — ACETAMINOPHEN 1000 MG: 500 TABLET ORAL at 23:18

## 2023-03-05 RX ADMIN — SODIUM CHLORIDE, POTASSIUM CHLORIDE, SODIUM LACTATE AND CALCIUM CHLORIDE 500 ML: 600; 310; 30; 20 INJECTION, SOLUTION INTRAVENOUS at 20:35

## 2023-03-05 RX ADMIN — METHOCARBAMOL 750 MG: 100 INJECTION INTRAMUSCULAR; INTRAVENOUS at 05:30

## 2023-03-05 RX ADMIN — ENOXAPARIN SODIUM 30 MG: 100 INJECTION SUBCUTANEOUS at 20:27

## 2023-03-05 RX ADMIN — ACETAMINOPHEN 1000 MG: 500 TABLET ORAL at 05:23

## 2023-03-05 RX ADMIN — ACETAMINOPHEN 1000 MG: 500 TABLET ORAL at 16:03

## 2023-03-05 RX ADMIN — HYDROMORPHONE HYDROCHLORIDE 0.5 MG: 1 INJECTION, SOLUTION INTRAMUSCULAR; INTRAVENOUS; SUBCUTANEOUS at 23:44

## 2023-03-05 RX ADMIN — SODIUM CHLORIDE, PRESERVATIVE FREE 10 ML: 5 INJECTION INTRAVENOUS at 20:28

## 2023-03-05 RX ADMIN — POTASSIUM CHLORIDE 10 MEQ: 10 INJECTION, SOLUTION INTRAVENOUS at 10:53

## 2023-03-05 RX ADMIN — SODIUM CHLORIDE, POTASSIUM CHLORIDE, SODIUM LACTATE AND CALCIUM CHLORIDE: 600; 310; 30; 20 INJECTION, SOLUTION INTRAVENOUS at 10:00

## 2023-03-05 RX ADMIN — ENOXAPARIN SODIUM 30 MG: 100 INJECTION SUBCUTANEOUS at 08:26

## 2023-03-05 RX ADMIN — POTASSIUM CHLORIDE 10 MEQ: 10 INJECTION, SOLUTION INTRAVENOUS at 11:56

## 2023-03-05 RX ADMIN — SODIUM CHLORIDE, POTASSIUM CHLORIDE, SODIUM LACTATE AND CALCIUM CHLORIDE: 600; 310; 30; 20 INJECTION, SOLUTION INTRAVENOUS at 08:27

## 2023-03-05 RX ADMIN — METHOCARBAMOL 750 MG: 100 INJECTION INTRAMUSCULAR; INTRAVENOUS at 23:18

## 2023-03-05 RX ADMIN — CEFTRIAXONE 1000 MG: 1 INJECTION, POWDER, FOR SOLUTION INTRAMUSCULAR; INTRAVENOUS at 18:24

## 2023-03-05 RX ADMIN — METHOCARBAMOL 750 MG: 100 INJECTION INTRAMUSCULAR; INTRAVENOUS at 02:24

## 2023-03-05 RX ADMIN — HYDROMORPHONE HYDROCHLORIDE 0.5 MG: 1 INJECTION, SOLUTION INTRAMUSCULAR; INTRAVENOUS; SUBCUTANEOUS at 05:34

## 2023-03-05 RX ADMIN — METHOCARBAMOL 750 MG: 100 INJECTION INTRAMUSCULAR; INTRAVENOUS at 18:16

## 2023-03-05 RX ADMIN — HYDROMORPHONE HYDROCHLORIDE 0.5 MG: 1 INJECTION, SOLUTION INTRAMUSCULAR; INTRAVENOUS; SUBCUTANEOUS at 18:23

## 2023-03-05 RX ADMIN — ACETAMINOPHEN 1000 MG: 500 TABLET ORAL at 09:58

## 2023-03-05 RX ADMIN — HYDROMORPHONE HYDROCHLORIDE 0.5 MG: 1 INJECTION, SOLUTION INTRAMUSCULAR; INTRAVENOUS; SUBCUTANEOUS at 11:17

## 2023-03-05 ASSESSMENT — PAIN DESCRIPTION - DESCRIPTORS
DESCRIPTORS: ACHING
DESCRIPTORS: ACHING;DISCOMFORT
DESCRIPTORS: ACHING;DISCOMFORT
DESCRIPTORS: ACHING
DESCRIPTORS: ACHING;DISCOMFORT
DESCRIPTORS: ACHING
DESCRIPTORS: ACHING
DESCRIPTORS: ACHING;DISCOMFORT
DESCRIPTORS: ACHING;DISCOMFORT

## 2023-03-05 ASSESSMENT — PAIN DESCRIPTION - ONSET
ONSET: ON-GOING
ONSET: ON-GOING

## 2023-03-05 ASSESSMENT — PAIN DESCRIPTION - LOCATION
LOCATION: ABDOMEN

## 2023-03-05 ASSESSMENT — PAIN DESCRIPTION - PAIN TYPE
TYPE: SURGICAL PAIN

## 2023-03-05 ASSESSMENT — PAIN DESCRIPTION - FREQUENCY
FREQUENCY: CONTINUOUS
FREQUENCY: CONTINUOUS

## 2023-03-05 ASSESSMENT — PAIN DESCRIPTION - ORIENTATION
ORIENTATION: MID
ORIENTATION: RIGHT
ORIENTATION: MID;RIGHT
ORIENTATION: RIGHT

## 2023-03-05 ASSESSMENT — PAIN SCALES - GENERAL
PAINLEVEL_OUTOF10: 5
PAINLEVEL_OUTOF10: 6
PAINLEVEL_OUTOF10: 7
PAINLEVEL_OUTOF10: 3
PAINLEVEL_OUTOF10: 7
PAINLEVEL_OUTOF10: 2
PAINLEVEL_OUTOF10: 8
PAINLEVEL_OUTOF10: 9
PAINLEVEL_OUTOF10: 2
PAINLEVEL_OUTOF10: 9

## 2023-03-05 ASSESSMENT — PAIN - FUNCTIONAL ASSESSMENT
PAIN_FUNCTIONAL_ASSESSMENT: ACTIVITIES ARE NOT PREVENTED

## 2023-03-05 NOTE — PROGRESS NOTES
Patient is A&O. VSS. Patient in early afternoon started to complain of increased abdominal pain and nausea, MD Yumiko Guidry was made aware at that time. Patient to OR. Patient returned to room at [address]. No other needs at this time.      Electronically signed by Jayro Cleary RN on 3/4/2023 at 8:11 PM

## 2023-03-05 NOTE — PROGRESS NOTES
Hospitalist Progress Note      PCP: Janiya Garcia MD    Date of Admission: 3/3/2023    Chief Complaint: Abdominal pain    Hospital Course: 63 y.o. female who presents from [facility] ED with complaints of abdominal pain and for general surgery consult. The patient just has had endoscopic surgery done by Dr. Severiano Bickers 3/1/2023 at [facility]. According to the patient the surgery was attempted was unable to do the surgery because of her hernia. Patient did undergo a laparoscopic surgery initially patient has presented to ED with increasing abdominal pain and outpatient pain medications helping. Patient also complains that she has not been passing flatus and has not had a bowel movement for days.   Denies any nausea or vomiting     Subjective: seen and examined  S/p OPEN RECURRENT INCARCERATED INCISIONAL HERNIA REPAIR WITH MESH, LYSIS OF ADHESIONS, SEGMENTAL SMALL BOWEL RESECTION,CREATION OF MYOFASCIAL FLAP 18CM X 10CM, EXCISION SUBCUTANEOUS CYST, INCISIONAL VAC PLACEMENT      Medications:  Reviewed    Infusion Medications    sodium chloride      lactated ringers IV soln 125 mL/hr at 03/05/23 0827     Scheduled Medications    lactated ringers bolus  1,000 mL IntraVENous Once    potassium chloride  10 mEq IntraVENous Q1H    cefTRIAXone (ROCEPHIN) IV  1,000 mg IntraVENous Q24H    methocarbamol IVPB  750 mg IntraVENous Q6H    acetaminophen  1,000 mg Oral Q6H    sodium chloride flush  5-40 mL IntraVENous 2 times per day    enoxaparin  30 mg SubCUTAneous BID     PRN Meds: HYDROmorphone **OR** HYDROmorphone, sodium chloride flush, sodium chloride, ondansetron **OR** ondansetron, polyethylene glycol, hydrALAZINE, promethazine, metoprolol      Intake/Output Summary (Last 24 hours) at 3/5/2023 0946  Last data filed at 3/5/2023 0224  Gross per 24 hour   Intake 875 ml   Output 4250 ml   Net -3375 ml       Physical Exam Performed:    /75   Pulse 78   Temp 98.5 °F (36.9 °C) (Oral)   Resp 18 Ht 5' 5\" (1.651 m)   Wt 277 lb 5.4 oz (125.8 kg)   SpO2 92%   BMI 46.15 kg/m²     General appearance: No apparent distress, appears stated age and cooperative. HEENT: Pupils equal, round, and reactive to light. Conjunctivae/corneas clear. Neck: Supple, with full range of motion. No jugular venous distention. Trachea midline. Respiratory:  Normal respiratory effort. Clear to auscultation, bilaterally without Rales/Wheezes/Rhonchi. Cardiovascular: Regular rate and rhythm with normal S1/S2 without murmurs, rubs or gallops. Abdomen: Soft, non-tender, non-distended with normal bowel sounds. Musculoskeletal: No clubbing, cyanosis or edema bilaterally. Full range of motion without deformity. Skin: Skin color, texture, turgor normal.  No rashes or lesions. Neurologic:  Neurovascularly intact without any focal sensory/motor deficits. Cranial nerves: II-XII intact, grossly non-focal.  Psychiatric: Alert and oriented, thought content appropriate, normal insight  Capillary Refill: Brisk, 3 seconds, normal   Peripheral Pulses: +2 palpable, equal bilaterally       Labs:   Recent Labs     03/04/23  0541 03/04/23  1932 03/05/23  0638   WBC 6.6 9.3 7.7   HGB 14.0 14.4 13.7   HCT 42.5 43.1 40.8    131* 174     Recent Labs     03/04/23  0541 03/04/23  1932 03/05/23  0638    134* 137   K 3.6 5.3* 3.7    100 100   CO2 27 23 29   BUN 19 17 21*   CREATININE 0.6 0.6 0.9   CALCIUM 8.7 8.0* 8.2*   PHOS 3.1 4.1 4.8     Recent Labs     03/02/23  1330   AST 15   ALT 16   BILITOT 0.6   ALKPHOS 119     No results for input(s): INR in the last 72 hours. No results for input(s): Mershon Gey in the last 72 hours.     Urinalysis:      Lab Results   Component Value Date/Time    NITRU Negative 03/02/2023 01:39 PM    WBCUA 12 03/02/2023 01:39 PM    BACTERIA None Seen 03/02/2023 01:39 PM    RBCUA 26 03/02/2023 01:39 PM    BLOODU LARGE 03/02/2023 01:39 PM    SPECGRAV 1.020 03/02/2023 01:39 PM    GLUCOSEU Negative 03/02/2023 01:39 PM       Radiology:  XR ABDOMEN (KUB) (SINGLE AP VIEW)   Final Result         Stable appearance of small bowel obstruction. No contrast progression into colon is identified. XR ABDOMEN (KUB) (SINGLE AP VIEW)   Final Result      Persistent small bowel obstruction. FL SMALL BOWEL FOLLOW THROUGH ONLY   Final Result      Small bowel obstruction at the ventral abdominal hernia.           IP CONSULT TO GENERAL SURGERY    Assessment/Plan:    Active Hospital Problems    Diagnosis     SBO (small bowel obstruction) ([address] [K56.609]      Priority: Medium        SBO (small bowel obstruction) ([address] [K56.609] 03/03/2023       Priority: Medium   #s/ p open adhesiolysis, hernia resection  Morbid obesity with BMI of 46  #Essential hypertension     PLAN:  -N.p.o. till return of Bowel activity  -Gentle IV hydration  -Pain relief  -General surgery consult  -Monitor electrolytes and renal function  -Supportive therapy    DVT Prophylaxis: lovenox  Diet: Diet NPO Exceptions are: Ice Chips, Sips of Water with Meds  Code Status: Full Code  PT/OT Eval Status: n/a     Dispo - inpatient, till improvement        Tod Greene MD

## 2023-03-05 NOTE — PROGRESS NOTES
Bariatric Surgery   Daily Progress Note  Patient: Booker Haddad      CC: Small bowel obstruction, recurrent incarcerated incisional hernia, Meckel's diverticulum causing incarcerated Littre's hernia, subcutaneous mass    SUBJECTIVE:   Afebrile, HDS. No acute events overnight. Doing well. No complaints. Pain controlled. No nausea, vomiting, flatus, or bowel movement. Caceres in place. Has not been out of bed. ROS:   A 14 point review of systems was conducted, significant findings as noted above. All other systems negative. OBJECTIVE:    PHYSICAL EXAM:  Vitals:    03/04/23 1935 03/04/23 2217 03/05/23 0221 03/05/23 0425   BP: (!) 142/78 (!) 152/82 120/70    Pulse: 82 92 83 81   Resp: 16 16 16 16   Temp: 97.7 °F (36.5 °C) 100.4 °F (38 °C) 98.4 °F (36.9 °C)    TempSrc: Oral Oral Oral    SpO2: 92% 92% 93% 91%   Weight:       Height:         General Appearance: Alert, no acute distress  Neuro: A&Ox3, no focal deficits, sensation intact  Chest/Lungs: Normal effort, on 3L NC, symmetric chest rise   Cardiovascular: RRR, no murmurs/gallops/rubs  Abdomen: Obese, soft, appropriately tender, ANUPAM with SS output, Prevena in place with good seal and adequate suction   : Caceres in place with clear theodore urine   Extremities: no edema, no cyanosis    LABS:   Recent Labs     03/04/23  0541 03/04/23 1932   WBC 6.6 9.3   HGB 14.0 14.4   HCT 42.5 43.1   MCV 91.1 91.8    131*        Recent Labs     03/04/23  0541 03/04/23 1932    134*   K 3.6 5.3*    100   CO2 27 23   PHOS 3.1 4.1   BUN 19 17   CREATININE 0.6 0.6     Recent Labs     03/02/23  1330   AST 15   ALT 16   BILITOT 0.6   ALKPHOS 119     Recent Labs     03/02/23  1330   LIPASE 41.0     Recent Labs     03/02/23  1330   PROT 8.2      No results for input(s): CKTOTAL, CKMB, CKMBINDEX, TROPONINI in the last 72 hours.       ASSESSMENT & PLAN:   This is a [address] year old female status post open recurrent incarcerated incisional hernia repair with mesh, lysis of adhesions, segmental small bowel resection for Meckel's diverticulectomy, creation of myofascial flap (18cm x 10cm), excision of subcutaneous cystic mass, placement of incisional wound VAC secondary to small bowel obstruction, recurrent incarcerated incisional hernia, Meckel's diverticulum causing incarcerated Littre's hernia, subcutaneous mass. POD1.     - Continue NPO until return of bowel function   - OOB today, ambulate   - Recommend removal of valdez today  - Continue ANUPAM to bulb suction -- anticipate discharge with drain, please provide ANUPAM teaching  - Continue ludy-op ABx -- will allow to fall off tomorrow if patient continues on current post-op trajectory    - Dispo pending return of bowel function       Tracy Alford MD  PGY1, General Surgery  03/05/23  6:58 AM  Pager# [phone]       Attending Addendum:    I have reviewed the notes, assessments, and/or procedures performed by the above author of the note,  In general I concur with her/his documentation of Loriemai Sakina with the following additions or corrections:    Pt is POD# 1 S/P open incarcerated recurrent incisional herniorrhaphy with mesh, partial omentectomy and segmental small bowel resection of a meckel diverticulum  Overall doing well  Pain and nausea relatively well controlled. Labs appropriate this am  She has been able to void after valdez was dc'd  Ambulating halls,   No nausea, will allow for CLD, if she develops nausea immediately make npo again. ANUPAM with SS output     Encourage OOB to chair and ambulation as much as possible     Further recs to follow     Would be happy to assume care of patient if requested by primary team... appreciate their management of medical comorbidities.          [address] [facility] DO 3/5/2023 1:25 PM   Call my cell with any questions or concerns, [phone]

## 2023-03-05 NOTE — PROGRESS NOTES
Bariatric Surgery  Post-operative Note    POST-OP DIAGNOSIS: Small bowel obstruction, recurrent incarcerated incisional hernia, Meckel's diverticulum causing incarcerated Littre's hernia, subcutaneous mass        PROCEDURE(S): Open recurrent incarcerated incisional hernia repair with mesh, lysis of adhesions, segmental small bowel resection for Meckel's diverticulectomy, creation of myofascial flap (18cm x 10cm), excision of subcutaneous cystic mass, placement of incisional wound VAC    SUBJECTIVE:   The patient reports that her pre-operative pain has improved, though she now is experiencing incisional pain, which is controlled with her currently available medications. She is no longer reporting nausea.      OBJECTIVE:  Physical Exam:  Vitals:   Vitals:    03/04/23 1821 03/04/23 1840 03/04/23 1935 03/04/23 2217   BP:  135/75 (!) 142/78 (!) 152/82   Pulse: 80 76 82 92   Resp: 16 16 16 16   Temp:  99.2 °F (37.3 °C) 97.7 °F (36.5 °C) 100.4 °F (38 °C)   TempSrc:  Oral Oral Oral   SpO2:  94% 92% 92%   Weight:       Height:         General Appearance: Alert, no acute distress  Neuro: A&Ox3, no focal deficits, sensation intact  Chest/Lungs: Normal effort, breathing 3L supplemental oxygen, symmetric chest rise   Cardiovascular: RRR, no murmurs/gallops/rubs  Abdomen: Obese, soft, appropriately tender, non-distended, ANUPAM with sanguinous output, Prevena in place with good seal and adequate suction   : Caceres in place with clear theodroe urine   Extremities: no edema, no cyanosis    ASSESSMENT/PLAN:  This is a 63y.o. year old female status post open recurrent incarcerated incisional hernia repair with mesh, lysis of adhesions, segmental small bowel resection for Meckel's diverticulectomy, creation of myofascial flap (18cm x 10cm), excision of subcutaneous cystic mass, placement of incisional wound VAC secondary to small bowel obstruction, recurrent incarcerated incisional hernia, Meckel's diverticulum causing incarcerated Littre's hernia, subcutaneous mass. POD0.    - Patient is recovering well at this point  - Post-op CBC reassuring, renal function panel reveals hyperkalemia though sample is hemolyzed.  Patient is asymptomatic; therefore, will repeat in AM.  - Continue tylenol, robaxin, and dilaudid PRN   - Continue NPO with IVF for now while awaiting return of bowel function  - Continue Rocephin  - Continue valdez for strict monitoring of intake and output   - ANUPAM to bulb suction   - VTE PPx with SCDs & lovenox      Yfn Silver MD, MPH  PGY-4 General Surgery  03/04/23  11:31 PM

## 2023-03-05 NOTE — PROGRESS NOTES
Pt medicated for pain with 0.5mg dilaudid q 3-4 hours. Pt states she feels her pain is well controlled. Ice also utilized. IVF infusing at 125ml/hr. Intermittent antibiotics and Robaxin IVPB. ANUPAM drain is cdi, about 70ml of bloody drainage noted overnight. Caceres cathetar draining theodore urine. Assisted pt with repositioning for back discomfort. BS active in lower quads only. Pt denies passing gas at this time. PT still on 3L NC.     Electronically signed by Gucci Anderson RN on 3/5/23 at 5:57 AM EST

## 2023-03-06 LAB
ALBUMIN SERPL-MCNC: 2.9 G/DL (ref 3.4–5)
ANION GAP SERPL CALCULATED.3IONS-SCNC: 10 MMOL/L (ref 3–16)
BASOPHILS ABSOLUTE: 0 K/UL (ref 0–0.2)
BASOPHILS RELATIVE PERCENT: 0.5 %
BUN BLDV-MCNC: 22 MG/DL (ref 7–20)
CALCIUM SERPL-MCNC: 8.5 MG/DL (ref 8.3–10.6)
CHLORIDE BLD-SCNC: 102 MMOL/L (ref 99–110)
CO2: 28 MMOL/L (ref 21–32)
CREAT SERPL-MCNC: 0.8 MG/DL (ref 0.6–1.2)
EOSINOPHILS ABSOLUTE: 0.2 K/UL (ref 0–0.6)
EOSINOPHILS RELATIVE PERCENT: 3.9 %
GFR SERPL CREATININE-BSD FRML MDRD: >60 ML/MIN/{1.73_M2}
GLUCOSE BLD-MCNC: 103 MG/DL (ref 70–99)
HCT VFR BLD CALC: 36.7 % (ref 36–48)
HEMOGLOBIN: 12.4 G/DL (ref 12–16)
LYMPHOCYTES ABSOLUTE: 1.2 K/UL (ref 1–5.1)
LYMPHOCYTES RELATIVE PERCENT: 19.4 %
MAGNESIUM: 2.1 MG/DL (ref 1.8–2.4)
MCH RBC QN AUTO: 30.7 PG (ref 26–34)
MCHC RBC AUTO-ENTMCNC: 33.8 G/DL (ref 31–36)
MCV RBC AUTO: 90.9 FL (ref 80–100)
MONOCYTES ABSOLUTE: 0.6 K/UL (ref 0–1.3)
MONOCYTES RELATIVE PERCENT: 9.6 %
NEUTROPHILS ABSOLUTE: 4.1 K/UL (ref 1.7–7.7)
NEUTROPHILS RELATIVE PERCENT: 66.6 %
PDW BLD-RTO: 13.3 % (ref 12.4–15.4)
PHOSPHORUS: 3.1 MG/DL (ref 2.5–4.9)
PLATELET # BLD: 153 K/UL (ref 135–450)
PMV BLD AUTO: 9.6 FL (ref 5–10.5)
POTASSIUM SERPL-SCNC: 3.9 MMOL/L (ref 3.5–5.1)
RBC # BLD: 4.03 M/UL (ref 4–5.2)
SODIUM BLD-SCNC: 140 MMOL/L (ref 136–145)
WBC # BLD: 6.2 K/UL (ref 4–11)

## 2023-03-06 PROCEDURE — 6360000002 HC RX W HCPCS

## 2023-03-06 PROCEDURE — 6360000002 HC RX W HCPCS: Performed by: SURGERY

## 2023-03-06 PROCEDURE — 94660 CPAP INITIATION&MGMT: CPT

## 2023-03-06 PROCEDURE — 2580000003 HC RX 258

## 2023-03-06 PROCEDURE — 2580000003 HC RX 258: Performed by: SURGERY

## 2023-03-06 PROCEDURE — 6370000000 HC RX 637 (ALT 250 FOR IP): Performed by: STUDENT IN AN ORGANIZED HEALTH CARE EDUCATION/TRAINING PROGRAM

## 2023-03-06 PROCEDURE — 1200000000 HC SEMI PRIVATE

## 2023-03-06 PROCEDURE — 83735 ASSAY OF MAGNESIUM: CPT

## 2023-03-06 PROCEDURE — 2580000003 HC RX 258: Performed by: STUDENT IN AN ORGANIZED HEALTH CARE EDUCATION/TRAINING PROGRAM

## 2023-03-06 PROCEDURE — 2700000000 HC OXYGEN THERAPY PER DAY

## 2023-03-06 PROCEDURE — 80069 RENAL FUNCTION PANEL: CPT

## 2023-03-06 PROCEDURE — 6370000000 HC RX 637 (ALT 250 FOR IP)

## 2023-03-06 PROCEDURE — 36415 COLL VENOUS BLD VENIPUNCTURE: CPT

## 2023-03-06 PROCEDURE — 6370000000 HC RX 637 (ALT 250 FOR IP): Performed by: SURGERY

## 2023-03-06 PROCEDURE — 85025 COMPLETE CBC W/AUTO DIFF WBC: CPT

## 2023-03-06 PROCEDURE — 94761 N-INVAS EAR/PLS OXIMETRY MLT: CPT

## 2023-03-06 RX ORDER — SODIUM CHLORIDE, SODIUM LACTATE, POTASSIUM CHLORIDE, AND CALCIUM CHLORIDE .6; .31; .03; .02 G/100ML; G/100ML; G/100ML; G/100ML
500 INJECTION, SOLUTION INTRAVENOUS ONCE
Status: COMPLETED | OUTPATIENT
Start: 2023-03-06 | End: 2023-03-06

## 2023-03-06 RX ORDER — DOCUSATE SODIUM 100 MG/1
100 CAPSULE, LIQUID FILLED ORAL 2 TIMES DAILY
Status: DISCONTINUED | OUTPATIENT
Start: 2023-03-06 | End: 2023-03-09 | Stop reason: HOSPADM

## 2023-03-06 RX ORDER — POTASSIUM CHLORIDE 7.45 MG/ML
10 INJECTION INTRAVENOUS
Status: COMPLETED | OUTPATIENT
Start: 2023-03-06 | End: 2023-03-06

## 2023-03-06 RX ORDER — BISACODYL 10 MG
10 SUPPOSITORY, RECTAL RECTAL ONCE
Status: COMPLETED | OUTPATIENT
Start: 2023-03-06 | End: 2023-03-06

## 2023-03-06 RX ORDER — POLYETHYLENE GLYCOL 3350 17 G/17G
17 POWDER, FOR SOLUTION ORAL 2 TIMES DAILY
Status: DISCONTINUED | OUTPATIENT
Start: 2023-03-06 | End: 2023-03-09 | Stop reason: HOSPADM

## 2023-03-06 RX ADMIN — CEFTRIAXONE 1000 MG: 1 INJECTION, POWDER, FOR SOLUTION INTRAMUSCULAR; INTRAVENOUS at 19:20

## 2023-03-06 RX ADMIN — SODIUM CHLORIDE, POTASSIUM CHLORIDE, SODIUM LACTATE AND CALCIUM CHLORIDE 500 ML: 600; 310; 30; 20 INJECTION, SOLUTION INTRAVENOUS at 05:45

## 2023-03-06 RX ADMIN — BISACODYL 10 MG: 10 SUPPOSITORY RECTAL at 12:49

## 2023-03-06 RX ADMIN — METHOCARBAMOL 750 MG: 100 INJECTION INTRAMUSCULAR; INTRAVENOUS at 05:50

## 2023-03-06 RX ADMIN — HYDROMORPHONE HYDROCHLORIDE 0.5 MG: 1 INJECTION, SOLUTION INTRAMUSCULAR; INTRAVENOUS; SUBCUTANEOUS at 12:50

## 2023-03-06 RX ADMIN — ACETAMINOPHEN 1000 MG: 500 TABLET ORAL at 10:11

## 2023-03-06 RX ADMIN — POLYETHYLENE GLYCOL (3350) 17 G: 17 POWDER, FOR SOLUTION ORAL at 12:49

## 2023-03-06 RX ADMIN — SODIUM CHLORIDE, POTASSIUM CHLORIDE, SODIUM LACTATE AND CALCIUM CHLORIDE: 600; 310; 30; 20 INJECTION, SOLUTION INTRAVENOUS at 13:12

## 2023-03-06 RX ADMIN — ONDANSETRON 4 MG: 2 INJECTION INTRAMUSCULAR; INTRAVENOUS at 20:19

## 2023-03-06 RX ADMIN — SODIUM CHLORIDE, POTASSIUM CHLORIDE, SODIUM LACTATE AND CALCIUM CHLORIDE: 600; 310; 30; 20 INJECTION, SOLUTION INTRAVENOUS at 03:30

## 2023-03-06 RX ADMIN — SODIUM CHLORIDE, PRESERVATIVE FREE 10 ML: 5 INJECTION INTRAVENOUS at 08:19

## 2023-03-06 RX ADMIN — ACETAMINOPHEN 1000 MG: 500 TABLET ORAL at 15:52

## 2023-03-06 RX ADMIN — METHOCARBAMOL 750 MG: 100 INJECTION INTRAMUSCULAR; INTRAVENOUS at 10:56

## 2023-03-06 RX ADMIN — HYDROMORPHONE HYDROCHLORIDE 0.5 MG: 1 INJECTION, SOLUTION INTRAMUSCULAR; INTRAVENOUS; SUBCUTANEOUS at 20:26

## 2023-03-06 RX ADMIN — METHOCARBAMOL 750 MG: 100 INJECTION INTRAMUSCULAR; INTRAVENOUS at 17:20

## 2023-03-06 RX ADMIN — DOCUSATE SODIUM 100 MG: 100 CAPSULE, LIQUID FILLED ORAL at 08:19

## 2023-03-06 RX ADMIN — ENOXAPARIN SODIUM 30 MG: 100 INJECTION SUBCUTANEOUS at 22:25

## 2023-03-06 RX ADMIN — SODIUM CHLORIDE, POTASSIUM CHLORIDE, SODIUM LACTATE AND CALCIUM CHLORIDE: 600; 310; 30; 20 INJECTION, SOLUTION INTRAVENOUS at 23:38

## 2023-03-06 RX ADMIN — HYDROMORPHONE HYDROCHLORIDE 0.25 MG: 1 INJECTION, SOLUTION INTRAMUSCULAR; INTRAVENOUS; SUBCUTANEOUS at 05:45

## 2023-03-06 RX ADMIN — ENOXAPARIN SODIUM 30 MG: 100 INJECTION SUBCUTANEOUS at 08:19

## 2023-03-06 RX ADMIN — POTASSIUM CHLORIDE 10 MEQ: 10 INJECTION, SOLUTION INTRAVENOUS at 08:19

## 2023-03-06 RX ADMIN — DOCUSATE SODIUM 100 MG: 100 CAPSULE, LIQUID FILLED ORAL at 22:33

## 2023-03-06 RX ADMIN — ACETAMINOPHEN 1000 MG: 500 TABLET ORAL at 22:33

## 2023-03-06 ASSESSMENT — PAIN SCALES - GENERAL
PAINLEVEL_OUTOF10: 6
PAINLEVEL_OUTOF10: 4
PAINLEVEL_OUTOF10: 7
PAINLEVEL_OUTOF10: 2
PAINLEVEL_OUTOF10: 7
PAINLEVEL_OUTOF10: 2
PAINLEVEL_OUTOF10: 5
PAINLEVEL_OUTOF10: 2
PAINLEVEL_OUTOF10: 0
PAINLEVEL_OUTOF10: 0

## 2023-03-06 ASSESSMENT — PAIN DESCRIPTION - ONSET
ONSET: GRADUAL

## 2023-03-06 ASSESSMENT — PAIN DESCRIPTION - LOCATION
LOCATION: ABDOMEN

## 2023-03-06 ASSESSMENT — PAIN DESCRIPTION - DESCRIPTORS
DESCRIPTORS: ACHING
DESCRIPTORS: BURNING;ACHING
DESCRIPTORS: ACHING
DESCRIPTORS: SORE
DESCRIPTORS: ACHING
DESCRIPTORS: ACHING

## 2023-03-06 ASSESSMENT — PAIN DESCRIPTION - PAIN TYPE
TYPE: SURGICAL PAIN

## 2023-03-06 ASSESSMENT — PAIN DESCRIPTION - ORIENTATION
ORIENTATION: RIGHT;LEFT
ORIENTATION: RIGHT;LEFT;MID
ORIENTATION: MID
ORIENTATION: RIGHT;LEFT;MID
ORIENTATION: MID
ORIENTATION: RIGHT;LEFT;MID

## 2023-03-06 ASSESSMENT — PAIN DESCRIPTION - FREQUENCY
FREQUENCY: INTERMITTENT

## 2023-03-06 NOTE — CARE COORDINATION
2:54 PM  Upon review of pts chart, pt is from home, IPTA, no current home services. Pt denies a need for any. Pt has transport at time of dc. CM will sign off at this time. Please consult CM/SW if any dcp needs arise.     [address] ordered per patient's request.     Electronically signed by Cedric Haines RN, CM on 3/6/2023 at 2:54 PM.  Phone: [phone]  Fax: [phone]

## 2023-03-06 NOTE — PROGRESS NOTES
Bariatric Surgery   Daily Progress Note  Patient: Patrick Cooper      CC: Small bowel obstruction, recurrent incarcerated incisional hernia, Meckel's diverticulum causing incarcerated Littre's hernia, subcutaneous mass    SUBJECTIVE:   Patient rested well overnight. Remained afebrile and HDS. Voiding independently. Denies flatus or BM. Pain controlled. Denies NV. Complains of some burping     ROS:   A 14 point review of systems was conducted, significant findings as noted above. All other systems negative. OBJECTIVE:    PHYSICAL EXAM:  Vitals:    03/05/23 2019 03/05/23 2317 03/06/23 0007 03/06/23 0331   BP: 125/75 (!) 146/78  (!) 144/72   Pulse: 86 86  69   Resp: 16 18 18 18   Temp: 98.8 °F (37.1 °C) 99 °F (37.2 °C)  97.3 °F (36.3 °C)   TempSrc: Oral Oral  Oral   SpO2: 96% 93% 94% 92%   Weight:       Height:         General Appearance: Alert, no acute distress  Neuro: A&Ox3, no focal deficits, sensation intact  Chest/Lungs: Normal effort, on 2L PAP, symmetric chest rise   Cardiovascular: RRR, no murmurs/gallops/rubs  Abdomen: Obese, soft, appropriately tender, ANUPAM with SS output 120ml/24hr, Prevena in place with good seal and adequate suction   : Caceres in place with clear theodore urine   Extremities: no edema, no cyanosis    LABS:   Recent Labs     03/05/23  0638 03/06/23  0424   WBC 7.7 6.2   HGB 13.7 12.4   HCT 40.8 36.7   MCV 91.1 90.9    153          Recent Labs     03/05/23  0638 03/06/23  0424    140   K 3.7 3.9    102   CO2 29 28   PHOS 4.8 3.1   BUN 21* 22*   CREATININE 0.9 0.8       No results for input(s): AST, ALT, ALB, BILIDIR, BILITOT, ALKPHOS in the last 72 hours. No results for input(s): LIPASE, AMYLASE in the last 72 hours. No results for input(s): PROT, INR, APTT in the last 72 hours. No results for input(s): CKTOTAL, CKMB, CKMBINDEX, TROPONINI in the last 72 hours.       ASSESSMENT & PLAN:   This is a 63y.o. year old female status post open recurrent incarcerated incisional hernia repair with mesh, lysis of adhesions, segmental small bowel resection for Meckel's diverticulectomy, creation of myofascial flap (18cm x 10cm), excision of subcutaneous cystic mass, placement of incisional wound VAC secondary to small bowel obstruction, recurrent incarcerated incisional hernia, Meckel's diverticulum causing incarcerated Littre's hernia, subcutaneous mass.  POD2.     - Continue CLD until return of bowel function with supplements   - encourage OOB, ambulation, IS   - Continue ANUPAM to bulb suction -- anticipate discharge with drain, please provide ANUPAM teaching  - Continue ludy-op ABx -- will discuss with team when to discontinue, will likely continue for 48 more hours  - Dispo pending return of bowel function     Jennifer Brown DO  PGY1, General Surgery  03/06/23  6:30 AM  [employer]  Pager: [phone]

## 2023-03-06 NOTE — PROGRESS NOTES
Hospitalist Progress Note      PCP: Belkis Oliver MD    Date of Admission: 3/3/2023    Chief Complaint: Abdominal pain    Hospital Course:   From previous provider: \"63 y.o. female who presents from [facility] ED with complaints of abdominal pain and for general surgery consult. The patient just has had endoscopic surgery done by Dr. Wilson Barlow 3/1/2023 at [facility]. According to the patient the surgery was attempted was unable to do the surgery because of her hernia. Patient did undergo a laparoscopic surgery initially patient has presented to ED with increasing abdominal pain and outpatient pain medications helping. Patient also complains that she has not been passing flatus and has not had a bowel movement for days. Denies any nausea or vomiting \"    Subjective:   Patient states that she has been having fecal urgency. She has not had any bowel movements today. Abdominal pain otherwise is controlled.       Medications:  Reviewed    Infusion Medications    sodium chloride      lactated ringers IV soln 125 mL/hr at 03/06/23 0330     Scheduled Medications    docusate sodium  100 mg Oral BID    cefTRIAXone (ROCEPHIN) IV  1,000 mg IntraVENous Q24H    methocarbamol IVPB  750 mg IntraVENous Q6H    acetaminophen  1,000 mg Oral Q6H    sodium chloride flush  5-40 mL IntraVENous 2 times per day    enoxaparin  30 mg SubCUTAneous BID     PRN Meds: HYDROmorphone **OR** HYDROmorphone, sodium chloride flush, sodium chloride, ondansetron **OR** ondansetron, polyethylene glycol, hydrALAZINE, promethazine, metoprolol      Intake/Output Summary (Last 24 hours) at 3/6/2023 0947  Last data filed at 3/6/2023 7052  Gross per 24 hour   Intake 4247.66 ml   Output 1180 ml   Net 3067.66 ml         Physical Exam Performed:    BP (!) 142/80   Pulse 83   Temp 97.7 °F (36.5 °C) (Oral)   Resp 18   Ht 5' 5\" (1.651 m)   Wt 277 lb 5.4 oz (125.8 kg)   SpO2 95%   BMI 46.15 kg/m²     General appearance: No apparent distress, appears stated age and cooperative. Obese  HEENT: Moist mucous membranes  Neck: Supple, with full range of motion. No jugular venous distention. Trachea midline. Respiratory:  Normal respiratory effort. Clear to auscultation, bilaterally without Rales/Wheezes/Rhonchi. Cardiovascular: Regular rate and rhythm with normal S1/S2 without murmurs, rubs or gallops. Abdomen: Soft, non-tender, non-distended with normal bowel sounds. Musculoskeletal: No clubbing, cyanosis or edema bilaterally. Full range of motion without deformity. Skin: Skin color, texture, turgor normal.  No rashes or lesions. Neurologic: grossly non-focal.  Psychiatric: Alert and oriented, thought content appropriate, normal insight      Labs:   Recent Labs     03/04/23 1932 03/05/23 0638 03/06/23 0424   WBC 9.3 7.7 6.2   HGB 14.4 13.7 12.4   HCT 43.1 40.8 36.7   * 174 153       Recent Labs     03/04/23 1932 03/05/23 0638 03/06/23 0424   * 137 140   K 5.3* 3.7 3.9    100 102   CO2 23 29 28   BUN 17 21* 22*   CREATININE 0.6 0.9 0.8   CALCIUM 8.0* 8.2* 8.5   PHOS 4.1 4.8 3.1       No results for input(s): AST, ALT, BILIDIR, BILITOT, ALKPHOS in the last 72 hours. No results for input(s): INR in the last 72 hours. No results for input(s): [facility] in the last 72 hours. Urinalysis:      Lab Results   Component Value Date/Time    NITRU Negative 03/02/2023 01:39 PM    WBCUA 12 03/02/2023 01:39 PM    BACTERIA None Seen 03/02/2023 01:39 PM    RBCUA 26 03/02/2023 01:39 PM    BLOODU LARGE 03/02/2023 01:39 PM    SPECGRAV 1.020 03/02/2023 01:39 PM    GLUCOSEU Negative 03/02/2023 01:39 PM       Radiology:  XR ABDOMEN (KUB) (SINGLE AP VIEW)   Final Result         Stable appearance of small bowel obstruction. No contrast progression into colon is identified. XR ABDOMEN (KUB) (SINGLE AP VIEW)   Final Result      Persistent small bowel obstruction.       FL SMALL BOWEL FOLLOW THROUGH ONLY   Final Result      Small bowel obstruction at the ventral abdominal hernia. IP CONSULT TO GENERAL SURGERY    Assessment/Plan:    Active Hospital Problems    Diagnosis     SBO (small bowel obstruction) ([facility] [address] [K56.609]      Priority: Medium        SBO (small bowel obstruction) ([facility] [address] [K56.609] 03/03/2023       Priority: Medium   Small bowel obstruction-due to incarcerated hernia  -General surgery consulted. s/ p open adhesiolysis, hernia resection with wound VAC placement on 3/4/2023. Patient is on clear liquid diet until return of bowel function. Plan to discharge with drain.  -Pain control  -Continue IV fluids. Monitor and replete electrolytes  -Continue ceftriaxone    Morbid obesity with BMI of 46  -Continue weight loss    Essential hypertension  -BP relatively controlled       DVT Prophylaxis: lovenox  Diet: ADULT DIET;  Clear Liquid  ADULT ORAL NUTRITION SUPPLEMENT; Breakfast, Lunch, Dinner; Clear Liquid Oral Supplement  Code Status: Full Code  PT/OT Eval Status: n/a     Dispo -Home when okay with surgery        Rosendo Buckley MD

## 2023-03-06 NOTE — PROGRESS NOTES
Patient is A&O. VSS with exception to sinus tach when ambulating. Patient is voiding dark tea/theodore urine. Ambulating without issue. Tolerating clears. No other needs noted.     Electronically signed by Maikol Houston RN on 3/5/2023 at 7:37 PM

## 2023-03-06 NOTE — PLAN OF CARE
Problem: Discharge Planning  Goal: Discharge to home or other facility with appropriate resources  Outcome: Progressing     Problem: Safety - Adult  Goal: Free from fall injury  Outcome: Progressing     Problem: ABCDS Injury Assessment  Goal: Absence of physical injury  Outcome: Progressing     Problem: Pain  Goal: Verbalizes/displays adequate comfort level or baseline comfort level  Outcome: Progressing  Flowsheets (Taken 3/6/2023 3820)  Verbalizes/displays adequate comfort level or baseline comfort level:   Implement non-pharmacological measures as appropriate and evaluate response   Administer analgesics based on type and severity of pain and evaluate response   Assess pain using appropriate pain scale   Encourage patient to monitor pain and request assistance  Note: Pain controlled with scheduled tylenol and PRN medications

## 2023-03-06 NOTE — PROGRESS NOTES
Patient A&Ox4. VSS. Wound vac remains in place. ANUPAM with bloody output. Abdominal binder on. IV fluids infusing per order. Bolus infused per order. Pt received prn dialudid for pain as well as scheduled tylenol and robaxin per orders. Pt ambulated in room and to the bathroom. Pt voiding tea urine. Bed is in lowest setting with bed alarm on and 2/4 side rails up. Call light is within reach.

## 2023-03-07 LAB
ALBUMIN SERPL-MCNC: 3.1 G/DL (ref 3.4–5)
ANION GAP SERPL CALCULATED.3IONS-SCNC: 10 MMOL/L (ref 3–16)
BASOPHILS ABSOLUTE: 0 K/UL (ref 0–0.2)
BASOPHILS RELATIVE PERCENT: 0.5 %
BUN BLDV-MCNC: 13 MG/DL (ref 7–20)
CALCIUM SERPL-MCNC: 8.2 MG/DL (ref 8.3–10.6)
CHLORIDE BLD-SCNC: 103 MMOL/L (ref 99–110)
CO2: 26 MMOL/L (ref 21–32)
CREAT SERPL-MCNC: <0.5 MG/DL (ref 0.6–1.2)
EOSINOPHILS ABSOLUTE: 0.2 K/UL (ref 0–0.6)
EOSINOPHILS RELATIVE PERCENT: 3.9 %
GFR SERPL CREATININE-BSD FRML MDRD: >60 ML/MIN/{1.73_M2}
GLUCOSE BLD-MCNC: 94 MG/DL (ref 70–99)
HCT VFR BLD CALC: 35.9 % (ref 36–48)
HEMOGLOBIN: 12.1 G/DL (ref 12–16)
LYMPHOCYTES ABSOLUTE: 0.9 K/UL (ref 1–5.1)
LYMPHOCYTES RELATIVE PERCENT: 16.2 %
MAGNESIUM: 1.9 MG/DL (ref 1.8–2.4)
MCH RBC QN AUTO: 30.5 PG (ref 26–34)
MCHC RBC AUTO-ENTMCNC: 33.5 G/DL (ref 31–36)
MCV RBC AUTO: 90.9 FL (ref 80–100)
MONOCYTES ABSOLUTE: 0.5 K/UL (ref 0–1.3)
MONOCYTES RELATIVE PERCENT: 8.9 %
NEUTROPHILS ABSOLUTE: 3.7 K/UL (ref 1.7–7.7)
NEUTROPHILS RELATIVE PERCENT: 70.5 %
PDW BLD-RTO: 13.5 % (ref 12.4–15.4)
PHOSPHORUS: 2.7 MG/DL (ref 2.5–4.9)
PLATELET # BLD: 163 K/UL (ref 135–450)
PMV BLD AUTO: 9.7 FL (ref 5–10.5)
POTASSIUM SERPL-SCNC: 3.5 MMOL/L (ref 3.5–5.1)
RBC # BLD: 3.95 M/UL (ref 4–5.2)
SODIUM BLD-SCNC: 139 MMOL/L (ref 136–145)
WBC # BLD: 5.3 K/UL (ref 4–11)

## 2023-03-07 PROCEDURE — 94761 N-INVAS EAR/PLS OXIMETRY MLT: CPT

## 2023-03-07 PROCEDURE — 2700000000 HC OXYGEN THERAPY PER DAY

## 2023-03-07 PROCEDURE — 2580000003 HC RX 258

## 2023-03-07 PROCEDURE — 6370000000 HC RX 637 (ALT 250 FOR IP): Performed by: INTERNAL MEDICINE

## 2023-03-07 PROCEDURE — 94660 CPAP INITIATION&MGMT: CPT

## 2023-03-07 PROCEDURE — 6370000000 HC RX 637 (ALT 250 FOR IP): Performed by: SURGERY

## 2023-03-07 PROCEDURE — 2580000003 HC RX 258: Performed by: STUDENT IN AN ORGANIZED HEALTH CARE EDUCATION/TRAINING PROGRAM

## 2023-03-07 PROCEDURE — 85025 COMPLETE CBC W/AUTO DIFF WBC: CPT

## 2023-03-07 PROCEDURE — 2500000003 HC RX 250 WO HCPCS: Performed by: STUDENT IN AN ORGANIZED HEALTH CARE EDUCATION/TRAINING PROGRAM

## 2023-03-07 PROCEDURE — 80069 RENAL FUNCTION PANEL: CPT

## 2023-03-07 PROCEDURE — 6360000002 HC RX W HCPCS: Performed by: SURGERY

## 2023-03-07 PROCEDURE — 6360000002 HC RX W HCPCS

## 2023-03-07 PROCEDURE — 1200000000 HC SEMI PRIVATE

## 2023-03-07 PROCEDURE — 6370000000 HC RX 637 (ALT 250 FOR IP)

## 2023-03-07 PROCEDURE — 36415 COLL VENOUS BLD VENIPUNCTURE: CPT

## 2023-03-07 PROCEDURE — 83735 ASSAY OF MAGNESIUM: CPT

## 2023-03-07 PROCEDURE — 2500000003 HC RX 250 WO HCPCS: Performed by: SURGERY

## 2023-03-07 PROCEDURE — 2500000003 HC RX 250 WO HCPCS

## 2023-03-07 PROCEDURE — 2580000003 HC RX 258: Performed by: SURGERY

## 2023-03-07 PROCEDURE — 2580000003 HC RX 258: Performed by: NURSE PRACTITIONER

## 2023-03-07 PROCEDURE — 6370000000 HC RX 637 (ALT 250 FOR IP): Performed by: NURSE PRACTITIONER

## 2023-03-07 RX ORDER — DIPHENHYDRAMINE HCL 25 MG
25 TABLET ORAL EVERY 6 HOURS PRN
Status: DISCONTINUED | OUTPATIENT
Start: 2023-03-07 | End: 2023-03-09 | Stop reason: HOSPADM

## 2023-03-07 RX ORDER — DEXAMETHASONE SODIUM PHOSPHATE 4 MG/ML
4 INJECTION, SOLUTION INTRA-ARTICULAR; INTRALESIONAL; INTRAMUSCULAR; INTRAVENOUS; SOFT TISSUE EVERY 6 HOURS
Status: COMPLETED | OUTPATIENT
Start: 2023-03-07 | End: 2023-03-08

## 2023-03-07 RX ORDER — LIDOCAINE 4 G/G
1 PATCH TOPICAL DAILY
Status: DISCONTINUED | OUTPATIENT
Start: 2023-03-07 | End: 2023-03-09 | Stop reason: HOSPADM

## 2023-03-07 RX ORDER — AMLODIPINE BESYLATE 5 MG/1
5 TABLET ORAL DAILY
Status: DISCONTINUED | OUTPATIENT
Start: 2023-03-07 | End: 2023-03-09 | Stop reason: HOSPADM

## 2023-03-07 RX ORDER — METRONIDAZOLE 500 MG/100ML
500 INJECTION, SOLUTION INTRAVENOUS EVERY 8 HOURS
Status: COMPLETED | OUTPATIENT
Start: 2023-03-07 | End: 2023-03-08

## 2023-03-07 RX ORDER — SCOLOPAMINE TRANSDERMAL SYSTEM 1 MG/1
1 PATCH, EXTENDED RELEASE TRANSDERMAL
Status: DISCONTINUED | OUTPATIENT
Start: 2023-03-07 | End: 2023-03-09 | Stop reason: HOSPADM

## 2023-03-07 RX ADMIN — METHOCARBAMOL 750 MG: 100 INJECTION INTRAMUSCULAR; INTRAVENOUS at 11:08

## 2023-03-07 RX ADMIN — AMLODIPINE BESYLATE 5 MG: 5 TABLET ORAL at 11:06

## 2023-03-07 RX ADMIN — METOPROLOL TARTRATE 5 MG: 1 INJECTION, SOLUTION INTRAVENOUS at 08:49

## 2023-03-07 RX ADMIN — METOPROLOL TARTRATE 5 MG: 1 INJECTION, SOLUTION INTRAVENOUS at 19:44

## 2023-03-07 RX ADMIN — METHOCARBAMOL 750 MG: 100 INJECTION INTRAMUSCULAR; INTRAVENOUS at 05:38

## 2023-03-07 RX ADMIN — DEXAMETHASONE SODIUM PHOSPHATE 4 MG: 4 INJECTION, SOLUTION INTRAMUSCULAR; INTRAVENOUS at 13:55

## 2023-03-07 RX ADMIN — DIPHENHYDRAMINE HYDROCHLORIDE 25 MG: 25 TABLET ORAL at 21:23

## 2023-03-07 RX ADMIN — HYDROMORPHONE HYDROCHLORIDE 0.5 MG: 1 INJECTION, SOLUTION INTRAMUSCULAR; INTRAVENOUS; SUBCUTANEOUS at 08:49

## 2023-03-07 RX ADMIN — ONDANSETRON 4 MG: 2 INJECTION INTRAMUSCULAR; INTRAVENOUS at 21:21

## 2023-03-07 RX ADMIN — DOCUSATE SODIUM 100 MG: 100 CAPSULE, LIQUID FILLED ORAL at 08:17

## 2023-03-07 RX ADMIN — DEXAMETHASONE SODIUM PHOSPHATE 4 MG: 4 INJECTION, SOLUTION INTRAMUSCULAR; INTRAVENOUS at 08:26

## 2023-03-07 RX ADMIN — DEXAMETHASONE SODIUM PHOSPHATE 4 MG: 4 INJECTION, SOLUTION INTRAMUSCULAR; INTRAVENOUS at 19:59

## 2023-03-07 RX ADMIN — DOCUSATE SODIUM 100 MG: 100 CAPSULE, LIQUID FILLED ORAL at 21:21

## 2023-03-07 RX ADMIN — METRONIDAZOLE 500 MG: 500 INJECTION, SOLUTION INTRAVENOUS at 18:28

## 2023-03-07 RX ADMIN — SODIUM CHLORIDE, POTASSIUM CHLORIDE, SODIUM LACTATE AND CALCIUM CHLORIDE: 600; 310; 30; 20 INJECTION, SOLUTION INTRAVENOUS at 19:47

## 2023-03-07 RX ADMIN — ENOXAPARIN SODIUM 30 MG: 100 INJECTION SUBCUTANEOUS at 08:17

## 2023-03-07 RX ADMIN — POTASSIUM PHOSPHATE, MONOBASIC AND POTASSIUM PHOSPHATE, DIBASIC 30 MMOL: 224; 236 INJECTION, SOLUTION, CONCENTRATE INTRAVENOUS at 11:10

## 2023-03-07 RX ADMIN — ONDANSETRON 4 MG: 2 INJECTION INTRAMUSCULAR; INTRAVENOUS at 03:29

## 2023-03-07 RX ADMIN — ENOXAPARIN SODIUM 30 MG: 100 INJECTION SUBCUTANEOUS at 21:21

## 2023-03-07 RX ADMIN — CEFTRIAXONE 1000 MG: 1 INJECTION, POWDER, FOR SOLUTION INTRAMUSCULAR; INTRAVENOUS at 18:23

## 2023-03-07 RX ADMIN — ACETAMINOPHEN 1000 MG: 500 TABLET ORAL at 11:06

## 2023-03-07 ASSESSMENT — PAIN DESCRIPTION - ORIENTATION
ORIENTATION: RIGHT;MID;LEFT
ORIENTATION: RIGHT;LEFT;MID

## 2023-03-07 ASSESSMENT — PAIN SCALES - GENERAL
PAINLEVEL_OUTOF10: 2
PAINLEVEL_OUTOF10: 4
PAINLEVEL_OUTOF10: 7
PAINLEVEL_OUTOF10: 7
PAINLEVEL_OUTOF10: 2

## 2023-03-07 ASSESSMENT — PAIN - FUNCTIONAL ASSESSMENT
PAIN_FUNCTIONAL_ASSESSMENT: ACTIVITIES ARE NOT PREVENTED
PAIN_FUNCTIONAL_ASSESSMENT: ACTIVITIES ARE NOT PREVENTED

## 2023-03-07 ASSESSMENT — PAIN DESCRIPTION - DESCRIPTORS
DESCRIPTORS: ACHING
DESCRIPTORS: ACHING

## 2023-03-07 ASSESSMENT — PAIN DESCRIPTION - LOCATION
LOCATION: ABDOMEN
LOCATION: ABDOMEN

## 2023-03-07 ASSESSMENT — PAIN DESCRIPTION - FREQUENCY: FREQUENCY: INTERMITTENT

## 2023-03-07 ASSESSMENT — PAIN SCALES - WONG BAKER: WONGBAKER_NUMERICALRESPONSE: 0

## 2023-03-07 ASSESSMENT — PAIN DESCRIPTION - PAIN TYPE: TYPE: SURGICAL PAIN

## 2023-03-07 ASSESSMENT — PAIN DESCRIPTION - ONSET: ONSET: GRADUAL

## 2023-03-07 NOTE — PROGRESS NOTES
Patient A&OX4. VSS. Patient has reported some intermittent nausea and pain during shift, scheduled medication has been given with benefit. Tolerating diet as ordered. Voiding adequately with no issues. Passing some gas, but no BM. IVF continue to infuse per orders. Patient has been able to ambulate in room with no issue, walker being used for additional support. All patient care needs have been met at this time. Fall precautions are in place. Bedside table and call light are within reach if needed. Will continue to monitor.      Electronically signed by Joan Guy RN on 3/7/2023 at 4:14 PM

## 2023-03-07 NOTE — PROGRESS NOTES
Seth A/O x4, VSS. SBA to BR. Started having Bms today. Prevena wound vac CDI, Left ANUPAM x1 to bulb suction draining bloody drainage. IVF infusing per orders. Pain controlled with PRN meds. No other needs at this time.  Electronically signed by Madhavi Doss RN on 3/6/2023 at 7:28 PM

## 2023-03-07 NOTE — PROGRESS NOTES
Bariatric Surgery   Daily Progress Note  Patient: Luis Antonio Parker      CC: Small bowel obstruction, recurrent incarcerated incisional hernia, Meckel's diverticulum causing incarcerated Littre's hernia, subcutaneous mass    SUBJECTIVE:   NAEO. Some nausea. Tolerating some CLD. Has not had protein supplement yet. Had small BM and some gas yesterday. Denies vomiting. Afebrile. HDS    ROS:   A 14 point review of systems was conducted, significant findings as noted above. All other systems negative. OBJECTIVE:    PHYSICAL EXAM:  Vitals:    03/07/23 0252 03/07/23 0450 03/07/23 0452 03/07/23 0532   BP:    (!) 149/93   Pulse: 71 69 98 75   Resp: 16 16 16 16   Temp:    98.1 °F (36.7 °C)   TempSrc:    Oral   SpO2: (!) 83% 91% 94% 96%   Weight:       Height:         General Appearance: Alert, no acute distress  Neuro: A&Ox3, no focal deficits, sensation intact  Chest/Lungs: Normal effort, on 2L NC, symmetric chest rise   Cardiovascular: RRR, no murmurs/gallops/rubs  Abdomen: Obese, soft, appropriately tender, ANUPAM with SS output 70ml/24hr, Prevena in place with good seal and adequate suction   : grossly normal  Extremities: no edema, no cyanosis    LABS:   Recent Labs     03/05/23  0638 03/06/23  0424   WBC 7.7 6.2   HGB 13.7 12.4   HCT 40.8 36.7   MCV 91.1 90.9    153          Recent Labs     03/05/23  0638 03/06/23  0424    140   K 3.7 3.9    102   CO2 29 28   PHOS 4.8 3.1   BUN 21* 22*   CREATININE 0.9 0.8       No results for input(s): AST, ALT, ALB, BILIDIR, BILITOT, ALKPHOS in the last 72 hours. No results for input(s): LIPASE, AMYLASE in the last 72 hours. No results for input(s): PROT, INR, APTT in the last 72 hours. No results for input(s): CKTOTAL, CKMB, CKMBINDEX, TROPONINI in the last 72 hours.       ASSESSMENT & PLAN:   This is a 63y.o. year old female status post open recurrent incarcerated incisional hernia repair with mesh, lysis of adhesions, segmental small bowel resection for Meckel's diverticulectomy, creation of myofascial flap (18cm x 10cm), excision of subcutaneous cystic mass, placement of incisional wound VAC secondary to small bowel obstruction, recurrent incarcerated incisional hernia, Meckel's diverticulum causing incarcerated Littre's hernia, subcutaneous mass. POD2.     - Continue CLD this morning due to nausea with supplements. Will consider advancing later today. Encourage taking protein modular  - encourage OOB, ambulation, IS   - Continue ANUPAM to bulb suction -- anticipate discharge with drain, please provide ANUPAM teaching  - Continue ludy-op Abx for 4 days  - Dispo pending return of bowel function     Jesusita Mack MD  PGY2, General Surgery  03/07/23  6:20 AM    I am post-call today and will not be on campus. Please contact Dr. Deloris Galo at [phone] for questions or concerns regarding this patient. Attending Addendum:    I have reviewed the notes, assessments, and/or procedures performed by the above author of the note,  In general I concur with her/his documentation of Joshua Duff with the following additions or corrections:    Pt is POD# 3 S/P open incarcerated recurrent incisional herniorrhaphy with mesh, partial omentectomy and segmental small bowel resection of a meckel diverticulum  Overall doing well  Pain and nausea relatively well controlled. Labs appropriate this am  She has been able to void   Ambulating halls,       She is c/o some nausea this morning, she was able to pass some flatus and have a small BM yesterday. Will add Scopolamine patch and decadron x 24 hrs to improve nausea. Will need to increase protein intake for healing, she did not attempt any of the protein shakes yesterday but will try today if the nausea resolves/improves. ANUPAM with SS output      Encourage OOB to chair and ambulation as much as possible      Further recs to follow      Would be happy to assume care of patient if requested by primary team... appreciate their management of medical comorbidities. Plan to dc home after she is tolerating more regular diet, ambulating without assistance and able to perform ADL safely, having more normal bowel function and clinical stability. Once these parameters are met she may be discharged home.        [address] Ad[address], DO 3/7/2023 8:12 AM   Call my cell with any questions or concerns, [phone]

## 2023-03-07 NOTE — PROGRESS NOTES
Hospitalist Progress Note      PCP: Portillo Sampson MD    Date of Admission: 3/3/2023    Chief Complaint: Abdominal pain    Hospital Course:   From previous provider: \"63 y.o. female who presents from [facility] ED with complaints of abdominal pain and for general surgery consult. The patient just has had endoscopic surgery done by Dr. Brandon Carrasquillo 3/1/2023 at [facility]. According to the patient the surgery was attempted was unable to do the surgery because of her hernia. Patient did undergo a laparoscopic surgery initially patient has presented to ED with increasing abdominal pain and outpatient pain medications helping. Patient also complains that she has not been passing flatus and has not had a bowel movement for days. Denies any nausea or vomiting \"    Subjective:   She had bowel movement yesterday. She has been passing flatus today. She has nausea. No abdominal pain.       Medications:  Reviewed    Infusion Medications    sodium chloride      lactated ringers IV soln 125 mL/hr at 03/06/23 2338     Scheduled Medications    scopolamine  1 patch TransDERmal Q72H    dexamethasone  4 mg IntraVENous Q6H    potassium phosphate IVPB  30 mmol IntraVENous Once    lidocaine  1 patch TransDERmal Daily    amLODIPine  5 mg Oral Daily    docusate sodium  100 mg Oral BID    polyethylene glycol  17 g Oral BID    cefTRIAXone (ROCEPHIN) IV  1,000 mg IntraVENous Q24H    methocarbamol IVPB  750 mg IntraVENous Q6H    acetaminophen  1,000 mg Oral Q6H    sodium chloride flush  5-40 mL IntraVENous 2 times per day    enoxaparin  30 mg SubCUTAneous BID     PRN Meds: HYDROmorphone **OR** HYDROmorphone, sodium chloride flush, sodium chloride, ondansetron **OR** ondansetron, hydrALAZINE, promethazine, metoprolol      Intake/Output Summary (Last 24 hours) at 3/7/2023 1221  Last data filed at 3/7/2023 1202  Gross per 24 hour   Intake 2748.86 ml   Output 1945 ml   Net 803.86 ml         Physical Exam Performed:    BP (!) 150/81   Pulse 72   Temp 98.3 °F (36.8 °C) (Oral)   Resp 18   Ht 5' 5\" (1.651 m)   Wt 277 lb 5.4 oz (125.8 kg)   SpO2 95%   BMI 46.15 kg/m²     General appearance: No apparent distress, appears stated age and cooperative. Obese  HEENT: Moist mucous membranes  Neck: Supple, with full range of motion. No jugular venous distention. Trachea midline. Respiratory:  Normal respiratory effort. Clear to auscultation, bilaterally without Rales/Wheezes/Rhonchi. Cardiovascular: Regular rate and rhythm with normal S1/S2 without murmurs, rubs or gallops. Abdomen: Soft, non-tender, non-distended with normal bowel sounds. Musculoskeletal: No clubbing, cyanosis or edema bilaterally. Full range of motion without deformity. Skin: Skin color, texture, turgor normal.  No rashes or lesions. Neurologic: grossly non-focal.  Psychiatric: Alert and oriented, thought content appropriate, normal insight      Labs:   Recent Labs     03/05/23  0638 03/06/23  0424 03/07/23  0648   WBC 7.7 6.2 5.3   HGB 13.7 12.4 12.1   HCT 40.8 36.7 35.9*    153 163       Recent Labs     03/05/23  0638 03/06/23  0424 03/07/23  0648    140 139   K 3.7 3.9 3.5    102 103   CO2 29 28 26   BUN 21* 22* 13   CREATININE 0.9 0.8 <0.5*   CALCIUM 8.2* 8.5 8.2*   PHOS 4.8 3.1 2.7       No results for input(s): AST, ALT, BILIDIR, BILITOT, ALKPHOS in the last 72 hours. No results for input(s): INR in the last 72 hours. No results for input(s): Cyndi Kaska in the last 72 hours. Urinalysis:      Lab Results   Component Value Date/Time    NITRU Negative 03/02/2023 01:39 PM    WBCUA 12 03/02/2023 01:39 PM    BACTERIA None Seen 03/02/2023 01:39 PM    RBCUA 26 03/02/2023 01:39 PM    BLOODU LARGE 03/02/2023 01:39 PM    SPECGRAV 1.020 03/02/2023 01:39 PM    GLUCOSEU Negative 03/02/2023 01:39 PM       Radiology:  XR ABDOMEN (KUB) (SINGLE AP VIEW)   Final Result         Stable appearance of small bowel obstruction.  No contrast progression into colon is identified. XR ABDOMEN (KUB) (SINGLE AP VIEW)   Final Result      Persistent small bowel obstruction. FL SMALL BOWEL FOLLOW THROUGH ONLY   Final Result      Small bowel obstruction at the ventral abdominal hernia. IP CONSULT TO GENERAL SURGERY    Assessment/Plan:    Active Hospital Problems    Diagnosis     SBO (small bowel obstruction) ([facility] [address] [K56.609]      Priority: Medium        SBO (small bowel obstruction) ([address] [K56.609] 03/03/2023       Priority: Medium   Small bowel obstruction-due to incarcerated hernia  -General surgery consulted. s/ p open adhesiolysis, hernia resection with wound VAC placement on 3/4/2023. Patient is on clear liquid diet until return of bowel function. Plan to discharge with drain. Pt was started on steriods.  -Pain control  -Continue IV fluids. Monitor and replete electrolytes  -Continue ceftriaxone    Morbid obesity with BMI of 46  -Continue weight loss    Essential hypertension  -BP elevated,restart home amlodipine. If still elevated,will start home losartan. DVT Prophylaxis: lovenox  Diet: ADULT DIET;  Clear Liquid  ADULT ORAL NUTRITION SUPPLEMENT; Breakfast, Lunch, Dinner; Protein Modular  Code Status: Full Code  PT/OT Eval Status: n/a     Dispo -Home when okay with surgery        Aleida Adkins MD

## 2023-03-07 NOTE — ADT AUTH CERT
Intestinal Obstruction - Care Day 3 (3/5/2023) by Sandrita Stallworth RN       Review Status Review Entered   Completed 3/7/2023 0817       Created By   Sandrita Stallworth RN      Criteria Review      Care Day: 3 Care Date: 3/5/2023 Level of Care: Telemetry    Guideline Day 2    Level Of Care    ( ) Floor    3/7/2023 8:17 AM EST by Viola Gaffney surg-tele    Clinical Status    (X) * Hypotension absent    3/7/2023 8:17 AM EST by Dorian Daily      /75, 135/88, 146/78    (X) * Nausea and vomiting absent or improved    3/7/2023 8:17 AM EST by Dorian Daily      Denies any nausea or vomiting    (X) * Pain absent or managed    3/7/2023 8:17 AM EST by Dorian Daily      on Tylenol 1g q6h po  Dilaudid 0.5mg q3h IV prn x 6    (X) * Abdominal exam stable or improved    3/7/2023 8:17 AM EST by Dorian Daily      Abdomen: Soft, non-tender, non-distended with normal bowel sounds    Activity    (X) Activity as tolerated    3/7/2023 8:17 AM EST by Dorian Daily      Ambulate patient    Routes    (X) IV fluids    3/7/2023 8:17 AM EST by Dorian Daily      LR cont IV infusion @ 125ml/hr  LR 1L IV bolus  LR 500ml IV bolus    (X) IV medications    3/7/2023 8:17 AM EST by Mary Kate Mejia 10meq q1h IV  Robaxin 750mg q6h IV  Rocephin 1g q24h IV    ( ) Diet as tolerated    3/7/2023 8:17 AM EST by Dorian Daily      Diet NPO Exceptions are: [employer], Sips of Water with Meds    Interventions    (X) * NG tube absent    (X) Surgical re-evaluation    (X) Possible follow-up abdominal imaging    * Milestone   Additional Notes   DATE: 3/5, Day 3         RELEVANT BASELINES: (lab values, vitals, o2 amount/delivery, etc.)   RA      PERTINENT UPDATES:   Pt is s/p open recurrent incarcerated incisional hernia repair with mesh, lysis adhesions, segemental sbo,creation of myofascial flap 18cmx 10cm , excision SC cyst , incisional vac placement.    Pt is still negative  for BM and flatus      VITALS:   T 99F WI 86 RR 18 /78 Spo2 93% on PAP 2LPM      ABNL/PERTINENT LABS/RADIOLOGY/DIAGNOSTIC STUDIES:   3/5/23 06:38   BUN,BUNPL: 21 (H)   Glucose, Random: 134 (H)   CALCIUM, SERUM, 110336: 8.2 (L)   Albumin: 3.2 (L)   Lymphocytes Absolute: 0.9 (L)         PHYSICAL EXAM:   Chest/Lungs: Normal effort, on 3L NC, symmetric chest rise    Cardiovascular: RRR, no murmurs/gallops/rubs   Abdomen: Obese, soft, appropriately tender, ANUPAM with SS output, Prevena in place with good seal and adequate suction       MD CONSULTS/ASSESSMENT AND PLAN:   **Bariatrics**   ASSESSMENT & PLAN:    This is a 63y.o. year old female status post open recurrent incarcerated incisional hernia repair with mesh, lysis of adhesions, segmental small bowel resection for Meckel's diverticulectomy, creation of myofascial flap (18cm x 10cm), excision of subcutaneous cystic mass, placement of incisional wound VAC secondary to small bowel obstruction, recurrent incarcerated incisional hernia, Meckel's diverticulum causing incarcerated Littre's hernia, subcutaneous mass. POD1.        - Continue NPO until return of bowel function    - OOB today, ambulate    - Recommend removal of valdez today   - Continue ANUPAM to bulb suction -- anticipate discharge with drain, please provide ANUPAM teaching   - Continue ludy-op ABx -- will allow to fall off tomorrow if patient continues on current post-op trajectory     - Dispo pending return of bowel function       **IM**   ·SBO   #s/ p open adhesiolysis, hernia resection   Morbid obesity with BMI of 46   #Essential hypertension   PLAN:   -N.p.o. till return of Bowel activity   -Gentle IV hydration   -Pain relief   -General surgery consult   -Monitor electrolytes and renal function   -Supportive therapy   DVT Prophylaxis: lovenox   Diet: Diet NPO Exceptions are: Ice Chips, Sips of Water with Meds   Code Status: Full Code   PT/OT Eval Status: n/a       MEDICATIONS:   Lovenox 30mg bid SC      ORDERS:   ADULT DIET;  Clear    Liquid Catheter removal    Ambulate patient    Nasal Cannula Oxygen Daily RT                  Intestinal Obstruction - Care Day 2 (3/4/2023) by Victoria Charles RN       Review Status Review Entered   Completed 3/6/2023 1930       Created By   Victoria Charles RN      Criteria Review      Care Day: 2 Care Date: 3/4/2023 Level of Care: Telemetry    Guideline Day 2    Clinical Status    (X) * Hypotension absent    ( ) * Nausea and vomiting absent or improved    3/6/2023 7:30 PM EST by Katerin Flores      The patient has become increasingly symptomatic now requiring pain medications and experiencing nausea. ( ) * Pain absent or managed    3/6/2023 7:30 PM EST by Katerin Flores      will take to OR    ( ) * Abdominal exam stable or improved    3/6/2023 7:30 PM EST by Katerin Flores      OPEN RECURRENT INCARCERATED INCISIONAL HERNIA REPAIR WITH MESH, LYSIS OF ADHESIONS, SEGMENTAL SMALL BOWEL RESECTION    Activity    (X) Activity as tolerated    3/6/2023 7:30 PM EST by Katerin Flores      bedrest    Routes    (X) IV fluids    3/6/2023 7:30 PM EST by Katerin Flores      lactated ringers IV soln infusion  Rate: 125 mL/hr Freq: CONTINUOUS Route: IV    (X) IV medications    3/6/2023 7:30 PM EST by Katerin Flores      magnesium sulfate 2000 mg in 50 mL IVPB premix  Dose: 2,000 mg  Freq: ONCE Route: IV    cefTRIAXone (ROCEPHIN) 1,000 mg in sodium chloride 0.9 % 50 mL IVPB (mini-bag)  Dose: 1,000 mg  Freq: EVERY 24 HOURS Route: IV    (X) Diet as tolerated    3/6/2023 7:30 PM EST by Katerin Flores      npo    Interventions    (X) * NG tube absent    (X) Surgical re-evaluation    3/6/2023 7:30 PM EST by Katerin Flores      The patient was recommended to undergo open incarcerated recurrent incisional hernia repair with mesh. She wishes to proceed following discussion of the risks, benefits, and alternatives to the procedure.     (X) Possible follow-up abdominal imaging    3/6/2023 7:30 PM EST by Katerin Flores      Abdominal xray reveals no progression of contrast vs this AM and worsening proximal dilation    * Milestone   Additional Notes   DATE: 03/04/23            PERTINENT UPDATES:      The patient has become increasingly symptomatic now requiring pain medications and experiencing nausea. Abdominal xray reveals no progression of contrast vs this AM and worsening proximal dilation. The patient was recommended to undergo open incarcerated recurrent incisional hernia repair with mesh. She wishes to proceed following discussion of the risks, benefits, and alternatives to the procedure.        VITALS:        03/04/23 1728 97.2 (36.2) 16 84 141/70Abnormal - Semi fowlers - 5 95 Nasal cannula       ABNL/PERTINENT LABS/RADIOLOGY/DIAGNOSTIC STUDIES:      Franc Vazquez DO   Physician   Bariatrics   Op Note       Signed   Date of Service:  3/4/2023  2:37 PM               Case Time: Procedures: Surgeons:    3/4/2023  2:37 PM    OPEN RECURRENT INCARCERATED INCISIONAL HERNIA REPAIR WITH MESH, LYSIS OF ADHESIONS, SEGMENTAL SMALL BOWEL RESECTION,CREATION OF MYOFASCIAL FLAP 18CM X 10CM, EXCISION SUBCUTANEOUS CYST, INCISIONAL Lowe DO Rupal              Signed          Operative Note           Patient: Solomon Fulton   YOB: 1960   MRN: [id number]       Date of Procedure: 3/4/2023       Pre-Op Diagnosis: small bowel obstruction, recurrent incarcerated incisional hernia       Post-Op Diagnosis: small bowel obstruction, recurrent incarcerated incisional hernia, meckles diverticulum causing an incarcerated Littres hernia, subcutaneous mass,          Procedure(s):   OPEN RECURRENT INCARCERATED INCISIONAL HERNIA REPAIR WITH MESH, LYSIS OF ADHESIONS, SEGMENTAL SMALL BOWEL RESECTION,CREATION OF MYOFASCIAL FLAP 18CM X 10CM, EXCISION SUBCUTANEOUS CYST, INCISIONAL VAC PLACEMENT       Surgeon(s):   Franc Vazquez DO       Assistant:    Resident: Wali Velazquez MD       Anesthesia: General       Estimated Blood Loss (mL): 067        Complications: None       Specimens:       ID Type Source Tests Collected by Time Destination   A : HERNIA [address] [facility], DO 3/4/2023 1518     B : PORTION OF SMALL BOWEL Tissue Tissue SURGICAL PATHOLOGY Vishal Cutlerw, DO 3/4/2023 1532     C : SUBCUTANEOUS MASS Tissue Tissue SURGICAL PATHOLOGY Vishal [facility], DO 3/4/2023 1627             Implants:   * No implants in log *        Drains:       Closed/Suction Drain Superior;Midline Abdomen Bulb (Active)       Urinary Catheter 03/04/23 2 Way (Active)           Findings: small bowel obstruction, recurrent incarcerated incisional hernia, meckles diverticulum and Littres hernia        Detailed Description of Procedure:            Surgeon: Vishal Costa DO        Assistants: Jennifer Marsh D.O. Anesthesia: General endotracheal anesthesia       ASA Class: 4                       Pre-operative diagnosis: incarcerated and recurrent periumbilical  incisional hernia       Patient Active Problem List   Diagnosis   · Umbilical hernia   · Chronic venous hypertension (idiopathic) with ulcer and inflammation of left lower extremity (HCC)   · Pain and swelling of left lower extremity   · Venous ulcer of left leg (HCC)   · SBO (small bowel obstruction) (HCC)           Post-operative diagnosis: incarcerated and recurrent periumbilical  incisional hernia, meckles diverticulum with Littres hernia, subcutaneous mass       Patient Active Problem List   Diagnosis   · Umbilical hernia   · Chronic venous hypertension (idiopathic) with ulcer and inflammation of left lower extremity (HCC)   · Pain and swelling of left lower extremity   · Venous ulcer of left leg (HCC)   · SBO (small bowel obstruction) ([facility] [address]           Procedure:    #1 Open incarcerated incisional  herniorrhaphy with mesh   #2 Lysis of adhesions x 90 minutes   #3 segmental small bowel resection as such a Meckel diverticulectomy.     #4 creation of 18 x 10 cm myofascial flaps    #4 placement of incisional vac           Facility: [facility].       Surgeon: Trae Monte Assist: Surgical resident, See hospital record for name and credentials       Anesthesia: General via endotracheal tube       Indications: Patient presents with a small bowel obstruction due to a  recurrent incarcerated incisional hernia. The hernia has been present for years. Recently the patient has experienced worsening symptoms associated with the hernia such as nausea vomiting, and worsening pain/tenderness. The presented for repair of the hernia. The risks, benefits and alternatives discussed with the patient in detail and the wish to proceed. Operative Report in Detail:The patient was correctly identified in the pre-operative holding area. Consent was confirmed and placed on the chart and pre-operative antibiotics were administered. The patient was taken back to the operating room and placed on the table in the supine position. General anesthesia was administered by the department of anesthesia via endotracheal tube. The abdomen was prepped and draped in the usual sterile fashion and a valdez catheter and orogastric tube were placed. A time-out was performed and all members of the team were in agreement. First a 15 blade scalpel used to create a linear incision overlying the incisional hernia just to the right of midline. The incision carried down to the level of the fascia meticulously dissecting the hernia sac away from the tissues. The hernia sac was circumferentially dissected free of the surrounding tissues around the fascial defect protecting the underlying bowel. During this dissection a partial omentectomy was performed as most of it was densley adhered to the hernia sac and there was a rent in the omentum which had a potential to cause an internal hernia. The hernia sac and omental contents resected were set off the field and sent to pathology.  This dissection was extensive and meticulous lasting 90 minutes. Once the fascial edges were cleared, we delivered the entire small bowel from the abdomen. We ran the bowel from the ligament of treitz to the terminal ileum, next we ran the colon from the cecum to the sigmoid. Interestingly at the transition point from from dilated to decompressed bowel a meckels diverticulum was identified. This was a relatively short diverticulum and had a broad base however because this appeared to be the transition point we decided to resect. A site roughly 10 cm proximally and distally was chosen, a small rent made in the mesentery. A small enterotomy made in the proximal and distal bowel and the excess fluid/chyme  was evacuated from the small bowel after milking the bowel contents to the enterotomy. An endo DILLON staple then fired across the bowel thus transecting it. The meckle diverticulectomy was then sent off the field and sent to pathology. The 2 ends of the resected small bowel were then anastomosed with an additional firing of the endo DILLON stapler thus creating a side to side anastomosis. The common enterotomy was then closed with an additional firing of the endo DILLON stapler with a blue load. A 3-0 silk placed at the crotch to reduce tension and an additional 3-O silk used to embrocate the staple line. The mesenteric  defect was closed with 2-O silk suture. Next the abdomen was copiously irrigated until the effluent returned clear. We next turned our attention to repairing the fascial defect. The fascia was thin and weak however we did not want to place a synthetic mesh intra-abdominally for risk of infection. We decided to use a phasix mesh as an onlay to reduce mesh infection risk. In order to facilitate repair with minimal tension myofascial flaps were created with blunt dissection and the bovie electrocautery. The flaps were 18 x 10 cm in size.  The fascial edges approximated with #1 Vicryl sutures in multiple figure of eight fashion. This did appear to repair the fascial defect with minimal tension. The subcutaneous tissues copiously irrigated with saline until the effluent returned clear. The phasic mesh was cut to completely cover the fascial repair with a minimum 4 cm margins on all sides. The mesh was secured with stratafix circumferentially and additional silk sutures directly over the repair. A 15- Fr. ANUPAM placed laterally and oriented into the subcutaneous cavity for the anticipated large seroma  and secured with a prolene. The deep subcutaneous tissues approximated with 2-o Vicryl in a running fashion and skin closed with staples. Abdomen was cleaned and dried, next a provena incisional vac was placed in the standard fashion. All sponge, needle and instrument counts correct at the end of the procedure. The patient tolerated the procedure well suffering no immediate post op complications. She was taken to PACU in stable condition,                     Estimated Blood Loss: 200cc       Drains: 15 Fr. ANUPAM       Complication: None       Disposition: PACU - hemodynamically stable. Electronically signed by: Bridget Hernandez D.O. 3/4/2023 5:37 PM            Electronically signed by Jamel Santos DO on 3/4/2023 at 5:34 PM                   Routing History               3/5/23 06:38   Glucose, Random: 134 (H)   CALCIUM, SERUM, 061110: 8.2 (L)   3/5/23 06:38   BUN,BUNPL: 21 (H)         PHYSICAL EXAM:      Abdomen: obese, partially reducible incisional hernia, with mild tenderness, moderately distended      MD CONSULTS/ASSESSMENT AND PLAN:      Jamel Santos DO   Physician   General Surgery             ASSESSMENT & PLAN:    This is a 63 y.o. female with Hx of sleeve gastrectomy s/p aborted RNY conversion.  CT scan without PO contrast with dilated loops of small  bowel concerning for small bowel obstruction.        - SBFT with contrast to small bowel, KUB this AM with persistent dilation and contrast not progressed to colon will continue to monitor   - Repeat KUB tomorrow AM   - NPO, IVF, can have ice chips for comfort   - Continue to monitor for ROBF, ambulate today   - Further care per primary team          MEDICATIONS:      methocarbamol (ROBAXIN) 750 mg in sodium chloride 0.9 % 100 mL IVPB   Dose: 750 mg   Freq: EVERY 6 HOURS Route: IV      HYDROmorphone (DILAUDID) injection 0.5 mg   Dose: 0.5 mg   Freq: EVERY 3 HOURS PRN Route: IV x 4      fentaNYL (SUBLIMAZE) injection 25 mcg   Dose: 25 mcg   Freq: EVERY 5 MIN PRN Route: IV x 1            ORDERS:      Lindsay Perez MD   Physician   Internal Medicine      Assessment/Plan:       Active Hospital Problems     Diagnosis     · SBO (small bowel obstruction) ([facility] [address] [K56.609]         Priority: Medium           · SBO (small bowel obstruction) ([facility] [address] [K56.609] 03/03/2023       Priority: Medium   #Morbid obesity with BMI of 46   #Essential hypertension       PLAN:   -N.p.o.   -Gentle IV hydration   -Pain relief   -General surgery consult   -Monitor electrolytes and renal function   -Supportive therapy       DVT Prophylaxis: lovenox   Diet: Diet NPO Exceptions are: Ice Chips   Code Status: Full Code   PT/OT Eval Status: n/a        Dispo - inpatient

## 2023-03-07 NOTE — PROGRESS NOTES
Pt c/o nausea, fullness as well as abd pain at beginning of shift. Pt medicated with dilaudid and zofran with relief. Pt then able to take colace and tylenol. Pt has been sleeping for intervals. Cpap on. Call light in reach.   Electronically signed by Juan Landry RN on 3/7/23 at 1:00 AM EST

## 2023-03-08 ENCOUNTER — APPOINTMENT (OUTPATIENT)
Dept: GENERAL RADIOLOGY | Age: 63
End: 2023-03-08
Attending: INTERNAL MEDICINE
Payer: COMMERCIAL

## 2023-03-08 LAB
ALBUMIN SERPL-MCNC: 2.9 G/DL (ref 3.4–5)
ANION GAP SERPL CALCULATED.3IONS-SCNC: 11 MMOL/L (ref 3–16)
BASOPHILS ABSOLUTE: 0 K/UL (ref 0–0.2)
BASOPHILS RELATIVE PERCENT: 0.5 %
BUN BLDV-MCNC: 9 MG/DL (ref 7–20)
CALCIUM SERPL-MCNC: 8.4 MG/DL (ref 8.3–10.6)
CHLORIDE BLD-SCNC: 102 MMOL/L (ref 99–110)
CO2: 26 MMOL/L (ref 21–32)
CREAT SERPL-MCNC: <0.5 MG/DL (ref 0.6–1.2)
EOSINOPHILS ABSOLUTE: 0.2 K/UL (ref 0–0.6)
EOSINOPHILS RELATIVE PERCENT: 3.2 %
GFR SERPL CREATININE-BSD FRML MDRD: >60 ML/MIN/{1.73_M2}
GLUCOSE BLD-MCNC: 104 MG/DL (ref 70–99)
HCT VFR BLD CALC: 39.6 % (ref 36–48)
HEMOGLOBIN: 12.7 G/DL (ref 12–16)
LYMPHOCYTES ABSOLUTE: 0.8 K/UL (ref 1–5.1)
LYMPHOCYTES RELATIVE PERCENT: 14.9 %
MAGNESIUM: 1.9 MG/DL (ref 1.8–2.4)
MCH RBC QN AUTO: 29.9 PG (ref 26–34)
MCHC RBC AUTO-ENTMCNC: 32.2 G/DL (ref 31–36)
MCV RBC AUTO: 92.7 FL (ref 80–100)
MONOCYTES ABSOLUTE: 0.4 K/UL (ref 0–1.3)
MONOCYTES RELATIVE PERCENT: 7.4 %
NEUTROPHILS ABSOLUTE: 4 K/UL (ref 1.7–7.7)
NEUTROPHILS RELATIVE PERCENT: 74 %
PDW BLD-RTO: 13.2 % (ref 12.4–15.4)
PHOSPHORUS: 2.9 MG/DL (ref 2.5–4.9)
PLATELET # BLD: 181 K/UL (ref 135–450)
PMV BLD AUTO: 9.4 FL (ref 5–10.5)
POTASSIUM SERPL-SCNC: 3.4 MMOL/L (ref 3.5–5.1)
RBC # BLD: 4.27 M/UL (ref 4–5.2)
SODIUM BLD-SCNC: 139 MMOL/L (ref 136–145)
WBC # BLD: 5.4 K/UL (ref 4–11)

## 2023-03-08 PROCEDURE — 6360000002 HC RX W HCPCS: Performed by: SURGERY

## 2023-03-08 PROCEDURE — 2500000003 HC RX 250 WO HCPCS

## 2023-03-08 PROCEDURE — 80069 RENAL FUNCTION PANEL: CPT

## 2023-03-08 PROCEDURE — 6360000002 HC RX W HCPCS: Performed by: NURSE PRACTITIONER

## 2023-03-08 PROCEDURE — 74018 RADEX ABDOMEN 1 VIEW: CPT

## 2023-03-08 PROCEDURE — 6370000000 HC RX 637 (ALT 250 FOR IP)

## 2023-03-08 PROCEDURE — 2580000003 HC RX 258

## 2023-03-08 PROCEDURE — 6370000000 HC RX 637 (ALT 250 FOR IP): Performed by: SURGERY

## 2023-03-08 PROCEDURE — 6360000002 HC RX W HCPCS: Performed by: STUDENT IN AN ORGANIZED HEALTH CARE EDUCATION/TRAINING PROGRAM

## 2023-03-08 PROCEDURE — 6360000002 HC RX W HCPCS

## 2023-03-08 PROCEDURE — 6370000000 HC RX 637 (ALT 250 FOR IP): Performed by: INTERNAL MEDICINE

## 2023-03-08 PROCEDURE — 85025 COMPLETE CBC W/AUTO DIFF WBC: CPT

## 2023-03-08 PROCEDURE — 83735 ASSAY OF MAGNESIUM: CPT

## 2023-03-08 PROCEDURE — 36415 COLL VENOUS BLD VENIPUNCTURE: CPT

## 2023-03-08 PROCEDURE — 6370000000 HC RX 637 (ALT 250 FOR IP): Performed by: STUDENT IN AN ORGANIZED HEALTH CARE EDUCATION/TRAINING PROGRAM

## 2023-03-08 PROCEDURE — 1200000000 HC SEMI PRIVATE

## 2023-03-08 PROCEDURE — C9113 INJ PANTOPRAZOLE SODIUM, VIA: HCPCS | Performed by: NURSE PRACTITIONER

## 2023-03-08 PROCEDURE — 6370000000 HC RX 637 (ALT 250 FOR IP): Performed by: NURSE PRACTITIONER

## 2023-03-08 RX ORDER — PANTOPRAZOLE SODIUM 40 MG/10ML
40 INJECTION, POWDER, LYOPHILIZED, FOR SOLUTION INTRAVENOUS DAILY
Status: DISCONTINUED | OUTPATIENT
Start: 2023-03-08 | End: 2023-03-09 | Stop reason: HOSPADM

## 2023-03-08 RX ORDER — OXYCODONE HYDROCHLORIDE 5 MG/1
10 TABLET ORAL EVERY 4 HOURS PRN
Status: DISCONTINUED | OUTPATIENT
Start: 2023-03-08 | End: 2023-03-09 | Stop reason: HOSPADM

## 2023-03-08 RX ORDER — POTASSIUM CHLORIDE 7.45 MG/ML
10 INJECTION INTRAVENOUS
Status: DISPENSED | OUTPATIENT
Start: 2023-03-08 | End: 2023-03-08

## 2023-03-08 RX ORDER — LOSARTAN POTASSIUM 50 MG/1
50 TABLET ORAL DAILY
Status: DISCONTINUED | OUTPATIENT
Start: 2023-03-08 | End: 2023-03-09

## 2023-03-08 RX ORDER — OXYCODONE HYDROCHLORIDE 5 MG/1
5 TABLET ORAL EVERY 4 HOURS PRN
Status: DISCONTINUED | OUTPATIENT
Start: 2023-03-08 | End: 2023-03-09 | Stop reason: HOSPADM

## 2023-03-08 RX ADMIN — ENOXAPARIN SODIUM 30 MG: 100 INJECTION SUBCUTANEOUS at 20:31

## 2023-03-08 RX ADMIN — ENOXAPARIN SODIUM 30 MG: 100 INJECTION SUBCUTANEOUS at 09:03

## 2023-03-08 RX ADMIN — LOSARTAN POTASSIUM 50 MG: 50 TABLET, FILM COATED ORAL at 14:56

## 2023-03-08 RX ADMIN — ONDANSETRON 4 MG: 2 INJECTION INTRAMUSCULAR; INTRAVENOUS at 09:11

## 2023-03-08 RX ADMIN — CEFTRIAXONE 1000 MG: 1 INJECTION, POWDER, FOR SOLUTION INTRAMUSCULAR; INTRAVENOUS at 19:21

## 2023-03-08 RX ADMIN — POTASSIUM CHLORIDE 10 MEQ: 10 INJECTION, SOLUTION INTRAVENOUS at 11:58

## 2023-03-08 RX ADMIN — METRONIDAZOLE 500 MG: 500 INJECTION, SOLUTION INTRAVENOUS at 04:28

## 2023-03-08 RX ADMIN — HYDRALAZINE HYDROCHLORIDE 10 MG: 20 INJECTION INTRAMUSCULAR; INTRAVENOUS at 00:49

## 2023-03-08 RX ADMIN — METRONIDAZOLE 500 MG: 500 INJECTION, SOLUTION INTRAVENOUS at 19:24

## 2023-03-08 RX ADMIN — DEXAMETHASONE SODIUM PHOSPHATE 4 MG: 4 INJECTION, SOLUTION INTRAMUSCULAR; INTRAVENOUS at 04:28

## 2023-03-08 RX ADMIN — ACETAMINOPHEN 1000 MG: 500 TABLET ORAL at 16:37

## 2023-03-08 RX ADMIN — DOCUSATE SODIUM 100 MG: 100 CAPSULE, LIQUID FILLED ORAL at 09:03

## 2023-03-08 RX ADMIN — POTASSIUM CHLORIDE 10 MEQ: 10 INJECTION, SOLUTION INTRAVENOUS at 16:42

## 2023-03-08 RX ADMIN — AMLODIPINE BESYLATE 5 MG: 5 TABLET ORAL at 09:04

## 2023-03-08 RX ADMIN — ACETAMINOPHEN 1000 MG: 500 TABLET ORAL at 23:20

## 2023-03-08 RX ADMIN — POLYETHYLENE GLYCOL (3350) 17 G: 17 POWDER, FOR SOLUTION ORAL at 20:38

## 2023-03-08 RX ADMIN — HYDROMORPHONE HYDROCHLORIDE 0.5 MG: 1 INJECTION, SOLUTION INTRAMUSCULAR; INTRAVENOUS; SUBCUTANEOUS at 00:34

## 2023-03-08 RX ADMIN — ACETAMINOPHEN 1000 MG: 500 TABLET ORAL at 09:03

## 2023-03-08 RX ADMIN — PANTOPRAZOLE SODIUM 40 MG: 40 INJECTION, POWDER, FOR SOLUTION INTRAVENOUS at 16:37

## 2023-03-08 RX ADMIN — POTASSIUM CHLORIDE 10 MEQ: 10 INJECTION, SOLUTION INTRAVENOUS at 13:58

## 2023-03-08 RX ADMIN — METRONIDAZOLE 500 MG: 500 INJECTION, SOLUTION INTRAVENOUS at 09:07

## 2023-03-08 RX ADMIN — DOCUSATE SODIUM 100 MG: 100 CAPSULE, LIQUID FILLED ORAL at 20:38

## 2023-03-08 ASSESSMENT — PAIN SCALES - GENERAL
PAINLEVEL_OUTOF10: 7
PAINLEVEL_OUTOF10: 2
PAINLEVEL_OUTOF10: 2
PAINLEVEL_OUTOF10: 0
PAINLEVEL_OUTOF10: 2
PAINLEVEL_OUTOF10: 2

## 2023-03-08 ASSESSMENT — PAIN DESCRIPTION - PAIN TYPE
TYPE: SURGICAL PAIN

## 2023-03-08 ASSESSMENT — PAIN DESCRIPTION - FREQUENCY
FREQUENCY: INTERMITTENT

## 2023-03-08 ASSESSMENT — PAIN DESCRIPTION - ORIENTATION
ORIENTATION: MID
ORIENTATION: RIGHT;MID
ORIENTATION: RIGHT;MID

## 2023-03-08 ASSESSMENT — PAIN DESCRIPTION - LOCATION
LOCATION: ABDOMEN

## 2023-03-08 ASSESSMENT — PAIN DESCRIPTION - ONSET
ONSET: GRADUAL
ONSET: GRADUAL
ONSET: ON-GOING

## 2023-03-08 ASSESSMENT — PAIN DESCRIPTION - DESCRIPTORS
DESCRIPTORS: ACHING
DESCRIPTORS: SORE
DESCRIPTORS: ACHING

## 2023-03-08 ASSESSMENT — PAIN SCALES - WONG BAKER
WONGBAKER_NUMERICALRESPONSE: 0

## 2023-03-08 ASSESSMENT — PAIN - FUNCTIONAL ASSESSMENT
PAIN_FUNCTIONAL_ASSESSMENT: ACTIVITIES ARE NOT PREVENTED
PAIN_FUNCTIONAL_ASSESSMENT: ACTIVITIES ARE NOT PREVENTED
PAIN_FUNCTIONAL_ASSESSMENT: PREVENTS OR INTERFERES SOME ACTIVE ACTIVITIES AND ADLS

## 2023-03-08 NOTE — PROGRESS NOTES
Fleets enema administered per surgical order.     Electronically signed by Trinity Garcia RN on 3/8/2023 at 3:06 PM

## 2023-03-08 NOTE — PROGRESS NOTES
Hospitalist Progress Note      PCP: Marie Bee MD    Date of Admission: 3/3/2023    Chief Complaint: Abdominal pain    Hospital Course:   From previous provider: \"63 y.o. female who presents from [facility] ED with complaints of abdominal pain and for general surgery consult. The patient just has had endoscopic surgery done by Dr. Yves Gonzalez 3/1/2023 at [facility]. According to the patient the surgery was attempted was unable to do the surgery because of her hernia. Patient did undergo a laparoscopic surgery initially patient has presented to ED with increasing abdominal pain and outpatient pain medications helping. Patient also complains that she has not been passing flatus and has not had a bowel movement for days. Denies any nausea or vomiting \"    Subjective:   She was nauseous and vomitting bilous material after having soup. She has not had bowel movement. She feels nauseous      Medications:  Reviewed    Infusion Medications    sodium chloride      lactated ringers IV soln 75 mL/hr at 03/08/23 0107     Scheduled Medications    potassium chloride  10 mEq IntraVENous Q1H    scopolamine  1 patch TransDERmal Q72H    lidocaine  1 patch TransDERmal Daily    amLODIPine  5 mg Oral Daily    metroNIDAZOLE  500 mg IntraVENous Q8H    docusate sodium  100 mg Oral BID    polyethylene glycol  17 g Oral BID    cefTRIAXone (ROCEPHIN) IV  1,000 mg IntraVENous Q24H    acetaminophen  1,000 mg Oral Q6H    sodium chloride flush  5-40 mL IntraVENous 2 times per day    enoxaparin  30 mg SubCUTAneous BID     PRN Meds: diphenhydrAMINE, HYDROmorphone **OR** HYDROmorphone, sodium chloride flush, sodium chloride, ondansetron **OR** ondansetron, hydrALAZINE, promethazine, metoprolol      Intake/Output Summary (Last 24 hours) at 3/8/2023 1329  Last data filed at 3/8/2023 1129  Gross per 24 hour   Intake 1346.07 ml   Output 3845 ml   Net -2498.93 ml         Physical Exam Performed:    BP (!) 154/82   Pulse 68 Temp 98 °F (36.7 °C) (Oral)   Resp 18   Ht 5' 5\" (1.651 m)   Wt 277 lb 5.4 oz (125.8 kg)   SpO2 92%   BMI 46.15 kg/m²     General appearance: No apparent distress, appears stated age and cooperative. Obese  HEENT: Moist mucous membranes  Neck: Supple, with full range of motion. No jugular venous distention. Trachea midline. Respiratory:  Normal respiratory effort. Clear to auscultation, bilaterally without Rales/Wheezes/Rhonchi. Cardiovascular: Regular rate and rhythm with normal S1/S2 without murmurs, rubs or gallops. Abdomen: Soft, non-tender, non-distended with normal bowel sounds. Musculoskeletal: No clubbing, cyanosis or edema bilaterally. Full range of motion without deformity. Skin: Skin color, texture, turgor normal.  No rashes or lesions. Neurologic: grossly non-focal.  Psychiatric: Alert and oriented, thought content appropriate, normal insight      Labs:   Recent Labs     03/06/23  0424 03/07/23  0648 03/08/23  0605   WBC 6.2 5.3 5.4   HGB 12.4 12.1 12.7   HCT 36.7 35.9* 39.6    163 181       Recent Labs     03/06/23  0424 03/07/23  0648 03/08/23  0605    139 139   K 3.9 3.5 3.4*    103 102   CO2 28 26 26   BUN 22* 13 9   CREATININE 0.8 <0.5* <0.5*   CALCIUM 8.5 8.2* 8.4   PHOS 3.1 2.7 2.9       No results for input(s): AST, ALT, BILIDIR, BILITOT, ALKPHOS in the last 72 hours. No results for input(s): INR in the last 72 hours. No results for input(s): Wayna Khat in the last 72 hours. Urinalysis:      Lab Results   Component Value Date/Time    NITRU Negative 03/02/2023 01:39 PM    WBCUA 12 03/02/2023 01:39 PM    BACTERIA None Seen 03/02/2023 01:39 PM    RBCUA 26 03/02/2023 01:39 PM    BLOODU LARGE 03/02/2023 01:39 PM    SPECGRAV 1.020 03/02/2023 01:39 PM    GLUCOSEU Negative 03/02/2023 01:39 PM       Radiology:  XR ABDOMEN (KUB) (SINGLE AP VIEW)   Final Result     Dilated loops of small bowel measuring up to 5.2 cm. Findings    may relate to obstruction. Consider cross-sectional imaging. XR ABDOMEN (KUB) (SINGLE AP VIEW)   Final Result         Stable appearance of small bowel obstruction. No contrast progression into colon is identified. XR ABDOMEN (KUB) (SINGLE AP VIEW)   Final Result      Persistent small bowel obstruction. FL SMALL BOWEL FOLLOW THROUGH ONLY   Final Result      Small bowel obstruction at the ventral abdominal hernia. IP CONSULT TO GENERAL SURGERY    Assessment/Plan:    Active Hospital Problems    Diagnosis     SBO (small bowel obstruction) ([address] [K56.609]      Priority: Medium        SBO (small bowel obstruction) ([address] [K56.609] 03/03/2023       Priority: Medium   Small bowel obstruction-due to incarcerated hernia  -General surgery consulted. s/ p open adhesiolysis, hernia resection with wound VAC placement on 3/4/2023. Plan to discharge with drain. Patient is on clear liquid diet until return of bowel function. She did not tolerate advancement of diet. Repeat AXR shows dilated loops of small bowel measuring up to 5.2 cm. Fleet enemas were addeded  -Pain control  -Continue IV fluids. Monitor and replete electrolytes  -Continue ceftriaxone and flagyl    Morbid obesity with BMI of 46  -Continue weight loss    Essential hypertension  -BP elevated,restarted home amlodipine and will restart home losartan at reduced 50mg daily. DVT Prophylaxis: lovenox  Diet: ADULT DIET; Clear Liquid;  Low Fiber  ADULT ORAL NUTRITION SUPPLEMENT; Breakfast, Lunch, Dinner; Protein Modular  Code Status: Full Code  PT/OT Eval Status: n/a     Dispo -Home when okay with surgery        Codi Nielson MD

## 2023-03-08 NOTE — PROGRESS NOTES
Bariatric Surgery   Daily Progress Note  Patient: Lu Boyer      CC: Small bowel obstruction, recurrent incarcerated incisional hernia, Meckel's diverticulum causing incarcerated Littre's hernia, subcutaneous mass    SUBJECTIVE:   Patient had episode of 400ml of \"green bilious\" emesis overnight. She states that she immediately fell better after episode of emesis. Patient also had pain which was relieved by PRN dilaudid. Hypertensive overnight to 177/98 otherwise HDS. Complaints of erythema around the abdomen since yesterday. ROS:   A 14 point review of systems was conducted, significant findings as noted above. All other systems negative. OBJECTIVE:    PHYSICAL EXAM:  Vitals:    03/08/23 0034 03/08/23 0045 03/08/23 0225 03/08/23 0414   BP:  (!) 177/98  (!) 146/76   Pulse:  94 68 68   Resp: 16 16  16   Temp:  98.6 °F (37 °C)  98 °F (36.7 °C)   TempSrc:  Oral  Oral   SpO2:    91%   Weight:       Height:         General Appearance: Alert, no acute distress  Neuro: A&Ox3, no focal deficits, sensation intact  Chest/Lungs: Normal effort, on 2L NC, symmetric chest rise   Cardiovascular: RRR, no murmurs/gallops/rubs  Abdomen: Obese, soft, appropriately tender, ANUPAM with SS output 70ml/24hr, Prevena in place with good seal and adequate suction. New onset of erythema since yesterday  : grossly normal  Extremities: no edema, no cyanosis    LABS:   Recent Labs     03/06/23  0424 03/07/23  0648   WBC 6.2 5.3   HGB 12.4 12.1   HCT 36.7 35.9*   MCV 90.9 90.9    163          Recent Labs     03/06/23  0424 03/07/23  0648    139   K 3.9 3.5    103   CO2 28 26   PHOS 3.1 2.7   BUN 22* 13   CREATININE 0.8 <0.5*       No results for input(s): AST, ALT, ALB, BILIDIR, BILITOT, ALKPHOS in the last 72 hours. No results for input(s): LIPASE, AMYLASE in the last 72 hours. No results for input(s): PROT, INR, APTT in the last 72 hours.      No results for input(s): CKTOTAL, CKMB, CKMBINDEX, TROPONINI in the last 72 hours. ASSESSMENT & PLAN:   This is a 63y.o. year old female status post open recurrent incarcerated incisional hernia repair with mesh, lysis of adhesions, segmental small bowel resection for Meckel's diverticulectomy, creation of myofascial flap (18cm x 10cm), excision of subcutaneous cystic mass, placement of incisional wound VAC secondary to small bowel obstruction, recurrent incarcerated incisional hernia, Meckel's diverticulum causing incarcerated Littre's hernia, subcutaneous mass. POD3.     - Continue CLD this morning due to nausea with supplements. - encourage OOB, ambulation, IS   - Continue antiemetics with decadron  - Continue ANUPAM to bulb suction -- anticipate discharge with drain, please provide ANUPAM teaching  - Continue ludy-op Abx for 4 days  - Dispo pending return of bowel function     Holly Lopez DPM  Podiatric Resident PGY-1        Attending Addendum:    I have reviewed the notes, assessments, and/or procedures performed by the above author of the note,  In general I concur with her/his documentation of [place] with the following additions or corrections:    Pt is POD# 4 S/P open incarcerated recurrent incisional herniorrhaphy with mesh, partial omentectomy and segmental small bowel resection of a meckel diverticulum    Pt now with expected post operative ileus     Overall doing well  Pain and nausea relatively well controlled currently however had an episode of emesis last night. F/u am labs   She has been able to void   Ambulating halls,         Nausea improved with decadron and scop patch until she ate potato soup las night causing the vomiting spell. Stay on clear liquids with protein shakes until better GI output   Am XR appears to have contrast in the rectum now and pt feels urge.  Will consider adding Fleets Enema to see if her bowels will start moving better,      Will need to increase protein intake for healing, she did not attempt any of the protein shakes yesterday but will try today if the nausea resolves/improves. ANUPAM with SS output      Encourage OOB to chair and ambulation as much as possible      Further recs to follow      Would be happy to assume care of patient if requested by primary team... appreciate their management of medical comorbidities. Plan to dc home after she is tolerating more regular diet, ambulating without assistance and able to perform ADL safely, having more normal bowel function and clinical stability. Once these parameters are met she may be discharged home.        [address] Leann Adames, DO 3/8/2023 6:57 AM   Call my cell with any questions or concerns, [phone]

## 2023-03-08 NOTE — PROGRESS NOTES
POC Note:     Informed by RN that pt had 400 cc green bilious emesis. On interview with pt, she states that she had potato soup for dinner and subsequently felt ill. After throwing up, she states she feels much better. On exam, her abdomen is soft, and minimally tender to palpation. Will get AXR and pt advised on self-regulation of diet moving forward.      Julienne Godwin DO   PGY1, General Surgery  03/08/23  2:05 AM  Contact via [employer]

## 2023-03-08 NOTE — PROGRESS NOTES
Pt has continued to report nausea. Pt ate some potatoes and drank tea for dinner. Zofran and decadron given with minimal relief. Pt has ambulated to  twice, voiding well and passing gas. No bms. Pt refused glycolax d/t nausea. Pt reports pain is tolerable. Pt resting in bed now. CPAP on and call light in reach.   Electronically signed by Michelle Fairbanks RN on 3/7/23 at 11:46 PM EST DISCHARGE

## 2023-03-08 NOTE — CARE COORDINATION
[facility] She will deliver to bedside today.         Electronically signed by Moises Hyman RN, CM on 3/8/2023 at 9:24 AM.  Phone: [phone]  Fax: [phone]

## 2023-03-08 NOTE — PROGRESS NOTES
Pt called out with pain and nausea. Pt sitting up on side of bed and had 400 ml green bilious emesis. Medicated with dilaudid for pain and assisted to BR and back to bed. Pt reports feeling some relief. Surgical resident made aware. No new orders yet.   Electronically signed by Francis Ordoñez RN on 3/8/23 at 12:58 AM EST

## 2023-03-08 NOTE — PROGRESS NOTES
Pt taken down to ED Xray for KUB per technician's request.    Electronically signed by Steve Fofana RN on 3/8/23 at 2:25 AM EST

## 2023-03-09 VITALS
HEART RATE: 73 BPM | WEIGHT: 277.34 LBS | TEMPERATURE: 98.7 F | BODY MASS INDEX: 46.21 KG/M2 | HEIGHT: 65 IN | RESPIRATION RATE: 18 BRPM | OXYGEN SATURATION: 90 % | DIASTOLIC BLOOD PRESSURE: 88 MMHG | SYSTOLIC BLOOD PRESSURE: 172 MMHG

## 2023-03-09 LAB
ALBUMIN SERPL-MCNC: 3.2 G/DL (ref 3.4–5)
ANION GAP SERPL CALCULATED.3IONS-SCNC: 7 MMOL/L (ref 3–16)
BASOPHILS ABSOLUTE: 0 K/UL (ref 0–0.2)
BASOPHILS RELATIVE PERCENT: 0.6 %
BUN BLDV-MCNC: 6 MG/DL (ref 7–20)
CALCIUM SERPL-MCNC: 8.5 MG/DL (ref 8.3–10.6)
CHLORIDE BLD-SCNC: 101 MMOL/L (ref 99–110)
CO2: 33 MMOL/L (ref 21–32)
CREAT SERPL-MCNC: 0.6 MG/DL (ref 0.6–1.2)
EOSINOPHILS ABSOLUTE: 0.3 K/UL (ref 0–0.6)
EOSINOPHILS RELATIVE PERCENT: 6.1 %
GFR SERPL CREATININE-BSD FRML MDRD: >60 ML/MIN/{1.73_M2}
GLUCOSE BLD-MCNC: 96 MG/DL (ref 70–99)
HCT VFR BLD CALC: 35.4 % (ref 36–48)
HEMOGLOBIN: 11.9 G/DL (ref 12–16)
LYMPHOCYTES ABSOLUTE: 1.7 K/UL (ref 1–5.1)
LYMPHOCYTES RELATIVE PERCENT: 33.5 %
MAGNESIUM: 1.8 MG/DL (ref 1.8–2.4)
MCH RBC QN AUTO: 30.6 PG (ref 26–34)
MCHC RBC AUTO-ENTMCNC: 33.6 G/DL (ref 31–36)
MCV RBC AUTO: 90.9 FL (ref 80–100)
MONOCYTES ABSOLUTE: 0.5 K/UL (ref 0–1.3)
MONOCYTES RELATIVE PERCENT: 10.6 %
NEUTROPHILS ABSOLUTE: 2.5 K/UL (ref 1.7–7.7)
NEUTROPHILS RELATIVE PERCENT: 49.2 %
PDW BLD-RTO: 12.8 % (ref 12.4–15.4)
PHOSPHORUS: 3.2 MG/DL (ref 2.5–4.9)
PLATELET # BLD: 204 K/UL (ref 135–450)
PMV BLD AUTO: 9.7 FL (ref 5–10.5)
POTASSIUM SERPL-SCNC: 2.9 MMOL/L (ref 3.5–5.1)
POTASSIUM SERPL-SCNC: 4.1 MMOL/L (ref 3.5–5.1)
RBC # BLD: 3.89 M/UL (ref 4–5.2)
SODIUM BLD-SCNC: 141 MMOL/L (ref 136–145)
WBC # BLD: 5 K/UL (ref 4–11)

## 2023-03-09 PROCEDURE — 6370000000 HC RX 637 (ALT 250 FOR IP): Performed by: SURGERY

## 2023-03-09 PROCEDURE — 6360000002 HC RX W HCPCS: Performed by: NURSE PRACTITIONER

## 2023-03-09 PROCEDURE — 2580000003 HC RX 258: Performed by: NURSE PRACTITIONER

## 2023-03-09 PROCEDURE — 80069 RENAL FUNCTION PANEL: CPT

## 2023-03-09 PROCEDURE — 2580000003 HC RX 258: Performed by: SURGERY

## 2023-03-09 PROCEDURE — 84132 ASSAY OF SERUM POTASSIUM: CPT

## 2023-03-09 PROCEDURE — 6360000002 HC RX W HCPCS: Performed by: SURGERY

## 2023-03-09 PROCEDURE — 85025 COMPLETE CBC W/AUTO DIFF WBC: CPT

## 2023-03-09 PROCEDURE — 94660 CPAP INITIATION&MGMT: CPT

## 2023-03-09 PROCEDURE — 6370000000 HC RX 637 (ALT 250 FOR IP): Performed by: NURSE PRACTITIONER

## 2023-03-09 PROCEDURE — 83735 ASSAY OF MAGNESIUM: CPT

## 2023-03-09 PROCEDURE — 6370000000 HC RX 637 (ALT 250 FOR IP): Performed by: INTERNAL MEDICINE

## 2023-03-09 PROCEDURE — 36415 COLL VENOUS BLD VENIPUNCTURE: CPT

## 2023-03-09 PROCEDURE — 6370000000 HC RX 637 (ALT 250 FOR IP): Performed by: STUDENT IN AN ORGANIZED HEALTH CARE EDUCATION/TRAINING PROGRAM

## 2023-03-09 PROCEDURE — C9113 INJ PANTOPRAZOLE SODIUM, VIA: HCPCS | Performed by: NURSE PRACTITIONER

## 2023-03-09 PROCEDURE — 6370000000 HC RX 637 (ALT 250 FOR IP)

## 2023-03-09 RX ORDER — LOSARTAN POTASSIUM 50 MG/1
50 TABLET ORAL ONCE
Status: COMPLETED | OUTPATIENT
Start: 2023-03-09 | End: 2023-03-09

## 2023-03-09 RX ORDER — OXYCODONE HYDROCHLORIDE 5 MG/1
5 TABLET ORAL EVERY 6 HOURS PRN
Qty: 20 TABLET | Refills: 0 | Status: SHIPPED | OUTPATIENT
Start: 2023-03-09 | End: 2023-03-11

## 2023-03-09 RX ORDER — SCOLOPAMINE TRANSDERMAL SYSTEM 1 MG/1
1 PATCH, EXTENDED RELEASE TRANSDERMAL
Qty: 2 PATCH | Refills: 0 | Status: SHIPPED | OUTPATIENT
Start: 2023-03-10 | End: 2023-03-16

## 2023-03-09 RX ORDER — MAGNESIUM SULFATE IN WATER 40 MG/ML
2000 INJECTION, SOLUTION INTRAVENOUS ONCE
Status: COMPLETED | OUTPATIENT
Start: 2023-03-09 | End: 2023-03-09

## 2023-03-09 RX ORDER — POTASSIUM CHLORIDE 7.45 MG/ML
10 INJECTION INTRAVENOUS
Status: DISCONTINUED | OUTPATIENT
Start: 2023-03-09 | End: 2023-03-09

## 2023-03-09 RX ORDER — POTASSIUM CHLORIDE 7.45 MG/ML
10 INJECTION INTRAVENOUS PRN
Status: DISCONTINUED | OUTPATIENT
Start: 2023-03-09 | End: 2023-03-09 | Stop reason: HOSPADM

## 2023-03-09 RX ORDER — POLYETHYLENE GLYCOL 3350 17 G/17G
17 POWDER, FOR SOLUTION ORAL 2 TIMES DAILY PRN
Qty: 60 EACH | Refills: 0 | Status: SHIPPED | OUTPATIENT
Start: 2023-03-09 | End: 2023-04-08

## 2023-03-09 RX ORDER — POTASSIUM CHLORIDE 20 MEQ/1
40 TABLET, EXTENDED RELEASE ORAL PRN
Status: DISCONTINUED | OUTPATIENT
Start: 2023-03-09 | End: 2023-03-09 | Stop reason: HOSPADM

## 2023-03-09 RX ORDER — POTASSIUM CHLORIDE 7.45 MG/ML
10 INJECTION INTRAVENOUS
Status: COMPLETED | OUTPATIENT
Start: 2023-03-09 | End: 2023-03-09

## 2023-03-09 RX ORDER — DOCUSATE SODIUM 100 MG/1
100 CAPSULE, LIQUID FILLED ORAL 2 TIMES DAILY
Qty: 30 CAPSULE | Refills: 0 | Status: SHIPPED | OUTPATIENT
Start: 2023-03-09 | End: 2023-03-23

## 2023-03-09 RX ORDER — LOSARTAN POTASSIUM 100 MG/1
100 TABLET ORAL DAILY
Status: DISCONTINUED | OUTPATIENT
Start: 2023-03-10 | End: 2023-03-09 | Stop reason: HOSPADM

## 2023-03-09 RX ORDER — POTASSIUM CHLORIDE 20 MEQ/1
20 TABLET, EXTENDED RELEASE ORAL 2 TIMES DAILY WITH MEALS
Qty: 4 TABLET | Refills: 0 | Status: SHIPPED | OUTPATIENT
Start: 2023-03-09 | End: 2023-03-11

## 2023-03-09 RX ORDER — POTASSIUM CHLORIDE 20 MEQ/1
40 TABLET, EXTENDED RELEASE ORAL 2 TIMES DAILY WITH MEALS
Status: DISCONTINUED | OUTPATIENT
Start: 2023-03-09 | End: 2023-03-09 | Stop reason: HOSPADM

## 2023-03-09 RX ORDER — DOCUSATE SODIUM 100 MG/1
100 CAPSULE, LIQUID FILLED ORAL 2 TIMES DAILY PRN
Qty: 60 CAPSULE | Refills: 0 | Status: SHIPPED | OUTPATIENT
Start: 2023-03-09 | End: 2023-04-08

## 2023-03-09 RX ORDER — POTASSIUM CHLORIDE 20 MEQ/1
20 TABLET, EXTENDED RELEASE ORAL 2 TIMES DAILY WITH MEALS
Status: DISCONTINUED | OUTPATIENT
Start: 2023-03-09 | End: 2023-03-09

## 2023-03-09 RX ORDER — LIDOCAINE 4 G/G
1 PATCH TOPICAL DAILY
Qty: 30 EACH | Refills: 0 | Status: SHIPPED | OUTPATIENT
Start: 2023-03-10 | End: 2023-04-09

## 2023-03-09 RX ORDER — MAGNESIUM SULFATE 1 G/100ML
1000 INJECTION INTRAVENOUS ONCE
Status: DISCONTINUED | OUTPATIENT
Start: 2023-03-09 | End: 2023-03-09

## 2023-03-09 RX ORDER — OMEPRAZOLE 40 MG/1
40 CAPSULE, DELAYED RELEASE ORAL
Qty: 30 CAPSULE | Refills: 0 | Status: SHIPPED | OUTPATIENT
Start: 2023-03-09 | End: 2023-04-08

## 2023-03-09 RX ADMIN — POLYETHYLENE GLYCOL (3350) 17 G: 17 POWDER, FOR SOLUTION ORAL at 10:00

## 2023-03-09 RX ADMIN — POTASSIUM CHLORIDE 10 MEQ: 10 INJECTION, SOLUTION INTRAVENOUS at 11:00

## 2023-03-09 RX ADMIN — SODIUM CHLORIDE, POTASSIUM CHLORIDE, SODIUM LACTATE AND CALCIUM CHLORIDE: 600; 310; 30; 20 INJECTION, SOLUTION INTRAVENOUS at 03:16

## 2023-03-09 RX ADMIN — LOSARTAN POTASSIUM 50 MG: 50 TABLET, FILM COATED ORAL at 08:45

## 2023-03-09 RX ADMIN — ENOXAPARIN SODIUM 30 MG: 100 INJECTION SUBCUTANEOUS at 08:45

## 2023-03-09 RX ADMIN — POTASSIUM CHLORIDE 40 MEQ: 1500 TABLET, EXTENDED RELEASE ORAL at 08:57

## 2023-03-09 RX ADMIN — ACETAMINOPHEN 1000 MG: 500 TABLET ORAL at 09:48

## 2023-03-09 RX ADMIN — AMLODIPINE BESYLATE 5 MG: 5 TABLET ORAL at 08:44

## 2023-03-09 RX ADMIN — POTASSIUM CHLORIDE 10 MEQ: 10 INJECTION, SOLUTION INTRAVENOUS at 10:00

## 2023-03-09 RX ADMIN — DOCUSATE SODIUM 100 MG: 100 CAPSULE, LIQUID FILLED ORAL at 08:45

## 2023-03-09 RX ADMIN — SODIUM CHLORIDE, PRESERVATIVE FREE 10 ML: 5 INJECTION INTRAVENOUS at 08:45

## 2023-03-09 RX ADMIN — MAGNESIUM SULFATE HEPTAHYDRATE 2000 MG: 40 INJECTION, SOLUTION INTRAVENOUS at 08:51

## 2023-03-09 RX ADMIN — LOSARTAN POTASSIUM 50 MG: 50 TABLET, FILM COATED ORAL at 14:38

## 2023-03-09 RX ADMIN — POTASSIUM CHLORIDE 10 MEQ: 10 INJECTION, SOLUTION INTRAVENOUS at 09:04

## 2023-03-09 RX ADMIN — PANTOPRAZOLE SODIUM 40 MG: 40 INJECTION, POWDER, FOR SOLUTION INTRAVENOUS at 08:44

## 2023-03-09 ASSESSMENT — PAIN DESCRIPTION - LOCATION: LOCATION: ABDOMEN

## 2023-03-09 ASSESSMENT — PAIN SCALES - GENERAL: PAINLEVEL_OUTOF10: 0

## 2023-03-09 ASSESSMENT — PAIN SCALES - WONG BAKER: WONGBAKER_NUMERICALRESPONSE: 0

## 2023-03-09 NOTE — PROGRESS NOTES
Hospitalist Progress Note      PCP: Clarisa Nguyen MD    Date of Admission: 3/3/2023    Chief Complaint: Abdominal pain    Hospital Course:   From previous provider: \"63 y.o. female who presents from [facility] ED with complaints of abdominal pain and for general surgery consult. The patient just has had endoscopic surgery done by Dr. Bal Scherer 3/1/2023 at [facility]. According to the patient the surgery was attempted was unable to do the surgery because of her hernia. Patient did undergo a laparoscopic surgery initially patient has presented to ED with increasing abdominal pain and outpatient pain medications helping. Patient also complains that she has not been passing flatus and has not had a bowel movement for days. Denies any nausea or vomiting \"    Subjective:   Pt had bowel movement yesterday. Abdominal pain controlled. She has been ambulating.       Medications:  Reviewed    Infusion Medications    sodium chloride       Scheduled Medications    potassium chloride  10 mEq IntraVENous Q1H    potassium chloride  40 mEq Oral BID WC    losartan  50 mg Oral Daily    pantoprazole  40 mg IntraVENous Daily    scopolamine  1 patch TransDERmal Q72H    lidocaine  1 patch TransDERmal Daily    amLODIPine  5 mg Oral Daily    docusate sodium  100 mg Oral BID    polyethylene glycol  17 g Oral BID    acetaminophen  1,000 mg Oral Q6H    sodium chloride flush  5-40 mL IntraVENous 2 times per day    enoxaparin  30 mg SubCUTAneous BID     PRN Meds: oxyCODONE **OR** oxyCODONE, diphenhydrAMINE, sodium chloride flush, sodium chloride, ondansetron **OR** ondansetron, hydrALAZINE, promethazine, metoprolol      Intake/Output Summary (Last 24 hours) at 3/9/2023 1137  Last data filed at 3/9/2023 0324  Gross per 24 hour   Intake --   Output 515 ml   Net -515 ml         Physical Exam Performed:    BP (!) 160/81   Pulse 54   Temp 98.8 °F (37.1 °C) (Oral)   Resp 16   Ht 5' 5\" (1.651 m)   Wt 277 lb 5.4 oz (125.8 kg)   SpO2 91%   BMI 46.15 kg/m²     General appearance: No apparent distress, appears stated age and cooperative. Obese  HEENT: Moist mucous membranes  Neck: Supple, with full range of motion. No jugular venous distention. Trachea midline. Respiratory:  Normal respiratory effort. Clear to auscultation, bilaterally without Rales/Wheezes/Rhonchi. Cardiovascular: Regular rate and rhythm with normal S1/S2 without murmurs, rubs or gallops. Abdomen: Soft, non-tender, non-distended with normal bowel sounds. Musculoskeletal: No clubbing, cyanosis or edema bilaterally. Full range of motion without deformity. Skin: Skin color, texture, turgor normal.  No rashes or lesions. Neurologic: grossly non-focal.  Psychiatric: Alert and oriented, thought content appropriate, normal insight      Labs:   Recent Labs     03/07/23  0648 03/08/23  0605 03/09/23  0604   WBC 5.3 5.4 5.0   HGB 12.1 12.7 11.9*   HCT 35.9* 39.6 35.4*    181 204       Recent Labs     03/07/23  0648 03/08/23  0605 03/09/23  0604    139 141   K 3.5 3.4* 2.9*    102 101   CO2 26 26 33*   BUN 13 9 6*   CREATININE <0.5* <0.5* 0.6   CALCIUM 8.2* 8.4 8.5   PHOS 2.7 2.9 3.2       No results for input(s): AST, ALT, BILIDIR, BILITOT, ALKPHOS in the last 72 hours. No results for input(s): INR in the last 72 hours. No results for input(s): Kathyrn Belch in the last 72 hours. Urinalysis:      Lab Results   Component Value Date/Time    NITRU Negative 03/02/2023 01:39 PM    WBCUA 12 03/02/2023 01:39 PM    BACTERIA None Seen 03/02/2023 01:39 PM    RBCUA 26 03/02/2023 01:39 PM    BLOODU LARGE 03/02/2023 01:39 PM    SPECGRAV 1.020 03/02/2023 01:39 PM    GLUCOSEU Negative 03/02/2023 01:39 PM       Radiology:  XR ABDOMEN (KUB) (SINGLE AP VIEW)   Final Result     Dilated loops of small bowel measuring up to 5.2 cm. Findings    may relate to obstruction. Consider cross-sectional imaging.           XR ABDOMEN (KUB) (SINGLE AP VIEW) Final Result         Stable appearance of small bowel obstruction. No contrast progression into colon is identified. XR ABDOMEN (KUB) (SINGLE AP VIEW)   Final Result      Persistent small bowel obstruction. FL SMALL BOWEL FOLLOW THROUGH ONLY   Final Result      Small bowel obstruction at the ventral abdominal hernia. IP CONSULT TO GENERAL SURGERY    Assessment/Plan:    Active Hospital Problems    Diagnosis     SBO (small bowel obstruction) ([address] [K56.609]      Priority: Medium        SBO (small bowel obstruction) ([address] [K56.609] 03/03/2023       Priority: Medium   Small bowel obstruction-due to incarcerated hernia  -General surgery consulted. s/ p open adhesiolysis, hernia resection with wound VAC placement on 3/4/2023. Plan to discharge with drain. Patient is on clear liquid diet until return of bowel function. She did not tolerate advancement of diet. Repeat AXR 3/8/23 shows dilated loops of small bowel measuring up to 5.2 cm. She had bowel movement with enema.  -Pain control  -Continue IV fluids. Monitor and replete electrolytes  -Completed empiric ceftriaxone and flagyl    Severe hypokalemia  -Monitor and replete  -Telemetry    Morbid obesity with BMI of 46  -Continue weight loss    Essential hypertension  -BP elevated,restarted home amlodipine and will increase losartan to home 100mg daily dose. DVT Prophylaxis: lovenox  Diet: ADULT ORAL NUTRITION SUPPLEMENT; Breakfast, Lunch, Dinner; Protein Modular  ADULT DIET; Regular; Low Fiber  Code Status: Full Code  PT/OT Eval Status: n/a     Dispo -Home when tolerating diet and K wnl.         Damon Sandoval MD

## 2023-03-09 NOTE — ADT AUTH CERT
Intestinal Obstruction - Care Day 6 (3/8/2023) by Leo Willis RN       Review Status Review Entered   Completed 3/9/2023 1546       Created By   Leo Willis RN      Criteria Review      Care Day: 6 Care Date: 3/8/2023 Level of Care: Inpatient Floor    Guideline Day 3    Clinical Status    (X) * Hemodynamic stability    3/9/2023 3:46 PM EST by  Lat:  99.5  Oral  IA: 94  RR: 18  BP: 141/75 146/76 154/82 177/98  SPO2: 91% RA    ( ) * Nausea and vomiting absent or controlled and acceptable for next level of care    3/9/2023 3:46 PM EST by Daniel Villegas      Pt called out with pain and nausea, had 400 ml green bilious emesis    ( ) * Electrolyte abnormalities absent or acceptable for next level of care    3/9/2023 3:46 PM EST by Daniel Villegas      Potassium: 3.4 (L)    ( ) * Oral hydration maintained    3/9/2023 3:46 PM EST by Emily Otero to require IV fluid infusion    ( ) * Pain absent or managed    3/9/2023 3:46 PM EST by Daniel Villegas      Pain Scale: 2    ( ) * Surgery not indicated    3/9/2023 3:46 PM EST by Daniel Villegas      POD# 4 S/P open incarcerated recurrent incisional herniorrhaphy with mesh, partial omentectomy and segmental small bowel resection of a meckel diverticulum    ( ) * Discharge plans and education understood    Activity    (X) * Ambulatory or acceptable for next level of care    3/9/2023 3:46 PM EST by  Lat as tolerated    Routes    ( ) * Oral hydration    3/9/2023 3:46 PM EST by Daniel Villegas      PO hydrationn  Continues to require lactated ringers IV soln infusion  Rate: 75 mL/hr Freq: CONTINUOUS Route: IV    ( ) * Oral medications or regimen acceptable for next level of care    3/9/2023 3:46 PM EST by Daniel Villegas      acetaminophen 1,000 mg Q6hrs PO  amLODIPine 5 mg OD PO  losartan 50 mg OD PO  COLACE) 100 mg BID PO  (GLYCOLAX) 17 g BID PO    ( ) * Oral diet or acceptable for next level of care    3/9/2023 3:46 PM EST by Vernon Scherer      ADULT DIET; Clear Liquid;  Low Fiber       Definitions for Care Day 6    Hemodynamic stability    (X) Hemodynamic stability, as indicated by  1 or more  of the following :       (X) Patient hemodynamically stable, as indicated by  ALL  of the following  (1) (2) (3) (4) (5):          (X) Tachycardia absent          (X) Hypotension absent          (X) No evidence of inadequate perfusion (eg, no myocardial ischemia)          (X) No other hemodynamic abnormalities (eg, no Orthostatic hypotension)       Tachycardia absent    (X) Tachycardia absent, as indicated by  1 or more  of the following  (1) (2):       (X) Heart rate less than or equal to 100 beats per minute in adult or child 6 years or older       Hypotension absent    (X) Hypotension absent, as indicated by  1 or more  of the following  (1) (2) (3) (4):       (X) SBP greater than or equal to 90 mm Hg and without recent decrease greater than 40 mm Hg from       baseline in adult or child 10 years or older       (X) Mean arterial pressure [A] greater than or equal to 70 mm Hg in adult or child 10 years or       older       * Milestone   Additional Notes   DATE: 03/08/23      RELEVANT BASELINES: (lab values, vitals, o2 amount/delivery, etc.)   RA      PERTINENT UPDATES:   Will get AXR due to report of recent vomiting   Complaints of erythema around the abdomen since yesterday   Fleets enema administered per surgical order   Continues to require IV cefTRIAXonem, IV DECADRON, IV metronidazole, IV PROTONIX, IV hydrALAZINE  , potassium chloride, IV DILAUDID and IV ZOFRAN       ABNL/PERTINENT LABS/RADIOLOGY/DIAGNOSTIC STUDIES:   Creatinine: <0.5 (L)   Glucose, Random: 104 (H)   Albumin: 2.9 (L)      PHYSICAL EXAM:   HEENT: Moist mucous membranes   Chest/Lungs: Normal effort, on 2L PAP, symmetric chest rise    Cardiovascular: RRR, no murmurs/gallops/rubs   Abdomen: Obese, soft, appropriately tender, ANUPAM with SS output 70ml/24hr, Prevena in place with good seal and adequate suction. New onset of erythema since yesterday      MD CONSULTS/ASSESSMENTS & PLANS:    ###Internal Medicine###   Assessment/Plan:   small bowel obstruction   Small bowel obstruction-due to incarcerated hernia   - She did not tolerate advancement of diet. Repeat AXR shows dilated loops of small bowel measuring up to 5.2 cm. Fleet enemas were addeded   -Continue ceftriaxone and flagyl      Essential hypertension   -BP elevated,restarted home amlodipine and will restart home losartan at reduced 50mg daily. ###General Surgery###   ASSESSMENT & PLAN:    - Continue antiemetics with decadron      Pt now with expected post operative ileus        Pain and nausea relatively well controlled currently however had an episode of emesis last night. F/u am labs        Nausea improved with decadron and scop patch until she ate potato soup las night causing the vomiting spell. Stay on clear liquids with protein shakes until better GI output    Am XR appears to have contrast in the rectum now and pt feels urge. Will consider adding Fleets Enema to see if her bowels will start moving better,        MEDICATIONS:    cefTRIAXone  1,000 mg q24 hrs IV   metronidazole 500 mg q 8hrs IV   (DECADRON) 4 mg Q 6hrs  IV   (PROTONIX) 40 mg OD IV   hydrALAZINE 10 mg Q 6 HOURS PRN IV x 1   potassium chloride 10 mEq Q hr IV   (DILAUDID) 0.25 mg Q 3 HOURS PRN IV x 1   (ZOFRAN) 4 mg Q 6 HOURS PRN IV x 1   (LOVENOX) 30 mg BID SC   lidocaine 4 % external patch  OD TD      ORDERS:   Intake and output    Continuous Pulse Oximetry    Incentive spirometry    Drain care       CM ASSESSMENTS or NOTES:   Case Management   Called with Doreen for walker.  She will deliver to bedside today           Intestinal Obstruction - Care Day 4 (3/6/2023) by Sarah Negron RN       Review Status Review Entered   Completed 3/9/2023 1514       Created By   Sarah Negron RN      Criteria Review      Care Day: 4 Care Date: 3/6/2023 Level of Care: Inpatient Floor    Guideline Day 3    Clinical Status    ( ) * Hemodynamic stability    3/9/2023 3:14 PM EST by Dakotah Merida: 99.6 Oral  VA: 105  RR: 18  BP: 144/72 151/68 158/82 165/88  SPO2: 92% On 2L PAP    (X) * Nausea and vomiting absent or controlled and acceptable for next level of care    3/9/2023 3:14 PM EST by Poncho Turner      Controlled by IV ZOFRAN  None noted at this time    (X) * Electrolyte abnormalities absent or acceptable for next level of care    3/9/2023 3:14 PM EST by Poncho Turner      Sodium: 140  Potassium: 3.9  Chloride: 102  CO2: 28  Magnesium: 2.10  CALCIUM, SERUM,: 8.5  Phosphorus: 3.1    ( ) * Oral hydration maintained    3/9/2023 3:14 PM EST by Poncho Turner      Still receiving IV fluids infusions    ( ) * Pain absent or managed    3/9/2023 3:14 PM EST by Poncho Turner      Pain Scale: 7    ( ) * Surgery not indicated    3/9/2023 3:14 PM EST by Poncho Turner      s/ p open adhesiolysis, hernia resection with wound VAC placement on 3/4/2023. ( ) * Discharge plans and education understood    Activity    (X) * Ambulatory or acceptable for next level of care    3/9/2023 3:14 PM EST by Poncho Turner      Pt ambulated in room and to the bathroom. Routes    ( ) * Oral hydration    3/9/2023 3:14 PM EST by Poncho Turner      PO hydration  Still requires IV lactated ringers bolus 500 mL ONCE and  lactated ringers IV soln infusion  Rate: 75 mL/hr Freq: CONTINUOUS Route: IV    ( ) * Oral medications or regimen acceptable for next level of care    3/9/2023 3:14 PM EST by Poncho Turner      acetaminophen 1,000 mg Q6hrs PO  COLACE) 100 mg BID PO  (GLYCOLAX) 17 g BID PO    ( ) * Oral diet or acceptable for next level of care    3/9/2023 3:14 PM EST by Rodney Karen DIET;  Clear Liquid       Definitions for Care Day 4    Hypotension absent    (X) Hypotension absent, as indicated by  1 or more  of the following  (1) (2) (3) (4):       (X) SBP greater than or equal to 90 mm Hg and without recent decrease greater than 40 mm Hg from       baseline in adult or child 10 years or older       (X) Mean arterial pressure [A] greater than or equal to 70 mm Hg in adult or child 10 years or       older       * Milestone   Additional Notes   DATE: 03/06/23      RELEVANT BASELINES: (lab values, vitals, o2 amount/delivery, etc.)   RA      PERTINENT UPDATES:   Complains of burping and fecal urgency. She has not had any bowel movements today. + Abdominal pain   Wound vac remains in place. ANUPAM with bloody output. Continues to require IV cefTRIAXonem, IV ROBAXIN, IV potassium chloride, IV DILAUDID and IV ZOFRAN       ABNL/PERTINENT LABS/RADIOLOGY/DIAGNOSTIC STUDIES:   BUN,BUNPL: 22 (H)   Glucose, Random: 103 (H)      PHYSICAL EXAM:   HEENT: Moist mucous membranes   Chest/Lungs: Normal effort, on 2L PAP, symmetric chest rise    Cardiovascular: RRR, no murmurs/gallops/rubs   Abdomen: Obese, soft, appropriately tender, ANUPAM with SS output 120ml/24hr, Prevena in place with good seal and adequate suction       MD CONSULTS/ASSESSMENTS & PLANS:    ###Internal Medicine###   Assessment/Plan:     small bowel obstruction   Small bowel obstruction-due to incarcerated hernia   -General surgery consulted. s/ p open adhesiolysis, hernia resection with wound VAC placement on 3/4/2023. Patient is on clear liquid diet until return of bowel function. Plan to discharge with drain.   -Pain control   -Continue IV fluids.   Monitor and replete electrolytes   -Continue ceftriaxone       Morbid obesity with BMI of 46   -Continue weight loss      Essential hypertension   -BP relatively controlled      ###Bariatrics###   ASSESSMENT & PLAN:    This is a 63y.o. year old female status post open recurrent incarcerated incisional hernia repair with mesh, lysis of adhesions, segmental small bowel resection for Meckel's diverticulectomy, creation of myofascial flap (18cm x 10cm), excision of subcutaneous cystic mass, placement of incisional wound VAC secondary to small bowel obstruction, recurrent incarcerated incisional hernia, Meckel's diverticulum causing incarcerated Littre's hernia, subcutaneous mass.  POD2.        - Continue CLD until return of bowel function with supplements    - encourage OOB, ambulation, IS    - Continue ludy-op ABx -- will discuss with team when to discontinue, will likely continue for 48 more hours      MEDICATIONS:    cefTRIAXone  1,000 mg q24 hrs IV   (ROBAXIN) 750 mg q 6hrs IV   potassium chloride 10 mEq Q hr IV   (DILAUDID) 0.25 mg Q 3 HOURS PRN IV x 3   (ZOFRAN) 4 mg Q 6 HOURS PRN IV x 1   (LOVENOX) 30 mg BID SC   bisacodyl 10 mg ONCE RE      ORDERS:   Intake and output    Continuous Pulse Oximetry    Incentive spirometry    Drain care

## 2023-03-09 NOTE — PROGRESS NOTES
Surgery Daily Progress Note  Clearnce Dian  CC:  Small bowel obstruction, recurrent incarcerated incisional hernia, Meckel's diverticulum causing incarcerated Littre's hernia, subcutaneous mass  Subjective :  Had a BM yesterday after her enema. Feels much better. Feels hungry. No N/V. Objective    Infusions:   sodium chloride      lactated ringers IV soln 75 mL/hr at 03/09/23 0316        I/O:I/O last 3 completed shifts: In: 1346.1 [P.O.:180; I.V.:818.9; IV Piggyback:347.2]  Out: 5470 [Urine:5050; Emesis/NG output:400; Drains:20]           Wt Readings from Last 1 Encounters:   03/03/23 277 lb 5.4 oz (125.8 kg)                 LABS:    Recent Labs     03/07/23  0648 03/08/23  0605   WBC 5.3 5.4   HGB 12.1 12.7   HCT 35.9* 39.6   MCV 90.9 92.7    181        Recent Labs     03/07/23  0648 03/08/23  0605    139   K 3.5 3.4*    102   CO2 26 26   PHOS 2.7 2.9   BUN 13 9   CREATININE <0.5* <0.5*               Exam:BP (!) 146/70   Pulse 66   Temp 98.6 °F (37 °C) (Oral)   Resp 16   Ht 5' 5\" (1.651 m)   Wt 277 lb 5.4 oz (125.8 kg)   SpO2 91%   BMI 46.15 kg/m²   General appearance: alert, appears stated age and cooperative  Lungs: clear to auscultation bilaterally  Heart: regular rate and rhythm, S1, S2   Abdomen: soft, obese, appropriately-tender, non distended  Prevena intact, rash improving, ANUPAM with serous sang 15 cc out in 24 hours      ASSESSMENT/PLAN: This is a 63y.o. year old female status post open recurrent incarcerated incisional hernia repair with mesh, lysis of adhesions, segmental small bowel resection for Meckel's diverticulectomy, creation of myofascial flap (18cm x 10cm), excision of subcutaneous cystic mass, placement of incisional wound VAC secondary to small bowel obstruction, recurrent incarcerated incisional hernia, Meckel's diverticulum causing incarcerated Littre's hernia, subcutaneous mass.  POD# 5    Diet as tolerated  No heavy lifting  ANUPAM teaching   Home today      Roxianne Gottron, JOCELINE - CNP 3/9/2023 6:34 AM  [phone]

## 2023-03-09 NOTE — DISCHARGE SUMMARY
Hospital Medicine Discharge Summary    Patient ID: Jacob Rho      Patient's PCP: Christopher Caro MD    Admit Date: 3/3/2023     Discharge Date:   3/9/23    Admitting Physician: Hubmerto Dumont MD     Discharge Physician: Kishore Holden MD     Discharge Diagnoses: Active Hospital Problems    Diagnosis     SBO (small bowel obstruction) ([facility] [address] [K56.609]      Priority: Medium       The patient was seen and examined on day of discharge and this discharge summary is in conjunction with any daily progress note from day of discharge. Hospital Course:   Summary from other provider: \"63 y.o. female who presents from [facility] ED with complaints of abdominal pain and for general surgery consult. The patient just has had endoscopic surgery done by Dr. Harpreet Neville 3/1/2023 at [facility]. According to the patient the surgery was attempted was unable to do the surgery because of her hernia. Patient did undergo a laparoscopic surgery initially patient has presented to ED with increasing abdominal pain and outpatient pain medications helping. Patient also complains that she has not been passing flatus and has not had a bowel movement for days. Denies any nausea or vomiting \"     Small bowel obstruction-due to incarcerated hernia  -General surgery consulted. s/ p open adhesiolysis, hernia resection with wound VAC placement on 3/4/2023. Discharged with drain. Patient was started on clear liquid diet until return of bowel function. She did not tolerate advancement of diet initially. Repeat AXR 3/8/23 shows dilated loops of small bowel measuring up to 5.2 cm. She had bowel movement with enema. Diet was advanced to low fiber  -Pain control was provided  -IV fluids and electrolyte repletion was provided  -Completed  course of empiric ceftriaxone and flagyl post-op  -Discharged with walker. Severe hypokalemia  -Repleted while admitted. Discharged home on oral potassium.      Morbid obesity with BMI of 46  -Continue weight loss. FU Bariatric surgery. Essential hypertension  -BP elevated,restarted home amlodipine losartan doses with improvement of BP. Physical Exam Performed:     BP (!) 182/97   Pulse 70   Temp 98.4 °F (36.9 °C) (Oral)   Resp 17   Ht 5' 5\" (1.651 m)   Wt 277 lb 5.4 oz (125.8 kg)   SpO2 (!) 89%   BMI 46.15 kg/m²   General appearance: No apparent distress, appears stated age and cooperative. Obese  HEENT: Moist mucous membranes  Neck: Supple, with full range of motion. No jugular venous distention. Trachea midline. Respiratory:  Normal respiratory effort. Clear to auscultation, bilaterally without Rales/Wheezes/Rhonchi. Cardiovascular: Regular rate and rhythm with normal S1/S2 without murmurs, rubs or gallops. Abdomen: Soft, non-tender, non-distended with normal bowel sounds. Musculoskeletal: No clubbing, cyanosis or edema bilaterally. Full range of motion without deformity. Skin: Skin color, texture, turgor normal.  No rashes or lesions. Neurologic: grossly non-focal.  Psychiatric: Alert and oriented, thought content appropriate, normal insight         Labs: For convenience and continuity at follow-up the following most recent labs are provided:      CBC:    Lab Results   Component Value Date/Time    WBC 5.0 03/09/2023 06:04 AM    HGB 11.9 03/09/2023 06:04 AM    HCT 35.4 03/09/2023 06:04 AM     03/09/2023 06:04 AM       Renal:    Lab Results   Component Value Date/Time     03/09/2023 06:04 AM    K 2.9 03/09/2023 06:04 AM    K 4.0 03/03/2023 05:47 AM     03/09/2023 06:04 AM    CO2 33 03/09/2023 06:04 AM    BUN 6 03/09/2023 06:04 AM    CREATININE 0.6 03/09/2023 06:04 AM    CALCIUM 8.5 03/09/2023 06:04 AM    PHOS 3.2 03/09/2023 06:04 AM         Significant Diagnostic Studies    Radiology:   XR ABDOMEN (KUB) (SINGLE AP VIEW)   Final Result     Dilated loops of small bowel measuring up to 5.2 cm. Findings    may relate to obstruction.   Consider cross-sectional imaging. XR ABDOMEN (KUB) (SINGLE AP VIEW)   Final Result         Stable appearance of small bowel obstruction. No contrast progression into colon is identified. XR ABDOMEN (KUB) (SINGLE AP VIEW)   Final Result      Persistent small bowel obstruction. FL SMALL BOWEL FOLLOW THROUGH ONLY   Final Result      Small bowel obstruction at the ventral abdominal hernia. Consults:     IP CONSULT TO GENERAL SURGERY    Disposition:  Home     Condition at Discharge: Stable    Discharge Instructions/Follow-up:    Kimberly [address]  [phone]    Follow up in 2 week(s)  Post op follow up    [address]tw[phone]  [place] [address]  [phone]    Schedule an appointment as soon as possible for a visit in 1 week(s)  after hospital visit       Code Status:  Full Code     Activity: activity as tolerated    Diet:  Low Fiber      Discharge Medications:     Current Discharge Medication List             Details   !! docusate sodium (COLACE) 100 MG capsule Take 1 capsule by mouth 2 times daily for 14 days Please take while taking narcotic pain medicine. If you develop loose or watery stools, then stop taking. Qty: 30 capsule, Refills: 0      oxyCODONE (ROXICODONE) 5 MG immediate release tablet Take 1 tablet by mouth every 6 hours as needed for Pain for up to 20 doses. WARNING:  May cause drowsiness. May impair ability to operate vehicles or machinery. Do not use in combination with alcohol.  Max Daily Amount: 20 mg  Qty: 20 tablet, Refills: 0    Comments: Reduce doses taken as pain becomes manageable  Associated Diagnoses: SBO (small bowel obstruction) ([facility])      scopolamine (TRANSDERM-SCOP) transdermal patch Place 1 patch onto the skin every 72 hours for 6 days  Qty: 2 patch, Refills: 0      lidocaine 4 % external patch Place 1 patch onto the skin daily  Qty: 30 each, Refills: 0      polyethylene glycol (GLYCOLAX) 17 g packet Take 17 g by mouth 2 times daily as needed for Constipation  Qty: 60 each, Refills: 0      potassium chloride (KLOR-CON M) 20 MEQ extended release tablet Take 1 tablet by mouth 2 times daily (with meals) for 2 days  Qty: 4 tablet, Refills: 0      omeprazole (PRILOSEC) 40 MG delayed release capsule Take 1 capsule by mouth every morning (before breakfast)  Qty: 30 capsule, Refills: 0       !! - Potential duplicate medications found. Please discuss with provider. Details   !! docusate sodium (COLACE) 100 MG capsule Take 1 capsule by mouth 2 times daily as needed for Constipation  Qty: 60 capsule, Refills: 0       !! - Potential duplicate medications found. Please discuss with provider. Details   cetirizine (ZYRTEC) 10 MG tablet Take 10 mg by mouth at bedtime      Multiple Vitamins-Minerals (WOMENS MULTIVITAMIN) TABS Take by mouth daily      ondansetron (ZOFRAN-ODT) 4 MG disintegrating tablet Take 4 mg by mouth every 8 hours as needed for Nausea or Vomiting      acetaminophen (TYLENOL) 325 MG tablet Take 650 mg by mouth every 6 hours as needed for Pain      amLODIPine (NORVASC) 5 MG tablet TK 1 T PO  QD  Refills: 5      losartan (COZAAR) 100 MG tablet Take 100 mg by mouth nightly  Refills: 6             Time Spent on discharge is more than 30 minutes in the examination, evaluation, counseling and review of medications and discharge plan. Signed:    Tamar Delgado MD   3/9/2023      Thank you Camila Wright MD for the opportunity to be involved in this patient's care. If you have any questions or concerns please feel free to contact me at [phone].

## 2023-03-09 NOTE — DISCHARGE INSTRUCTIONS
Discharge Instructions:    Diet:   Stay on soft, low fiber diet until your follow up. Wound Care:   Apply gauze and tape on top of your incision. Drain care as below. Activity:   No heavy lifting greater than a milk jug until follow up. Pain management:   Unless informed of any restrictions by your primary care physician, please use your preferred over-the-counter pain reliever as your primary pain medication. If you have pain that persists despite over-the-counter pain medications, you have been provided with a prescription for an opioid/narcotic pain reliever. No driving or operating machinery while taking opioid/narcotic medications. Bowel Regimen:   Opioid/Narcotic pain relievers have a common side effect of constipation; therefore, you have been provided with a prescription for a stool softener, Docusate (Colace). These medications are intended to help prevent you from experiencing this very common side effect and also help to regulate your bowels after surgery. If your stools become too loose and/or frequent, decrease the Colace to one pill one time each day. If your stools are still loose after this modification, stop taking this medication all together. Return Precautions:   Call/ Return to ED for increased redness, worsening pain, drainage from wound, fevers, or any other concerns about your incision or post op course. Follow up with Dr. Gera Riley in 1-2 weeks. CARE OF YOUR LORI-PEREZ DRAIN    On the following page is a photo similar to your drain: your drain should be compressed at all times, except when physically emptying it. The collapsed drain creates a suction that helps to pull excess fluid out of the wound. Your drain should be emptied 2-3 times a day depending on how much it is draining. Record the date, time, amount and color of fluid collected. If you have more than one drain, record the output separately for   each drain.      Be sure your drains are clearly labeled as #1,#2 or R and L etc., it is important for us to know which drain you are discussing. The amount the drains are putting out helps us know when they may be removed. Watch the area where your drain is inserted as an infection can develop here as well. It is normal to have some fluid leak from around the drain; you should change the gauze dressing around your drain if it becomes wet. Call your surgeon if there is a lot of drainage from the site, and you have to change the dressing more than once a day. Your drain will be removed in approximately 7-10 days after your surgery. The surgeons medical assistant will tell you when this can be removed. Occasionally the drain may fall out on its own. If this happens, do not panic and call your surgeons office. HOW TO EMPTY YOUR DRAIN    Pull the stopper out of the top of the drain. Pour the fluid into the container you were provided. After the drain is empty, squeeze the drain flat and push the stopper back into the top of the drain. Remember to record the date, time, amount and color of fluid collected.                                         RECORD YOUR DRAIN OUTPUT(S) HERE:   DRAIN #1      Date/Time Amount  Color Date/Time Amount  Color

## 2023-03-09 NOTE — PROGRESS NOTES
Patient alert and oriented. VSS Pain controlled . Patient denies any nausea. CDI surgical sites wound vac in place. ANUPAM drain to RQ. Patient ambulates in zamora. Patient in bed lowest position call light and bedside table within reach. All needs are met at this time. Patient aware to call if any help needed. Will continue to monitor  BP (!) 146/70   Pulse 66   Temp 98.6 °F (37 °C) (Oral)   Resp 16   Ht 5' 5\" (1.651 m)   Wt 277 lb 5.4 oz (125.8 kg)   SpO2 91%   BMI 46.15 kg/m²

## (undated) DEVICE — GUIDEWIRE ORTH L150MM DIA1.25MM S STL NTHRD FOR 4MM CANN

## (undated) DEVICE — GLOVE ORANGE PI 8   MSG9080

## (undated) DEVICE — SUTURE PROL SZ 2-0 L36IN NONABSORBABLE BLU SH L26MM 1/2 CIR 8523H

## (undated) DEVICE — GLOVE SURG SZ 8 L12IN FNGR THK79MIL GRN LTX FREE

## (undated) DEVICE — SUTURE VCRL SZ 2-0 L18IN ABSRB UD CT-1 L36MM 1/2 CIR J839D

## (undated) DEVICE — BIT DRL DIA2.8MM SHT CALIB QUIK CPL W/ STP PRO-PAK

## (undated) DEVICE — GARMENT,MEDLINE,DVT,INT,CALF,LG, GEN2: Brand: MEDLINE

## (undated) DEVICE — GENERAL LAPAROSCOPIC: Brand: MEDLINE INDUSTRIES, INC.

## (undated) DEVICE — STAPLER SKIN L440MM 32MM LNG 12 FIRING B FRM PWR + GRIPPING

## (undated) DEVICE — BIT DRL L160MM DIA2.7MM CANN QUIK CPL ADJ STP REUSE FOR

## (undated) DEVICE — RESERVOIR,SUCTION,100CC,SILICONE: Brand: MEDLINE

## (undated) DEVICE — DRAIN,WOUND,15FR,3/16,FULL-FLUTED: Brand: MEDLINE

## (undated) DEVICE — SUTURE ABSORBABLE BRAIDED 2-0 CT-1 27 IN UD VICRYL J259H

## (undated) DEVICE — SHEET POS TRANSFER 46X34 IN 1000 LB PERM COMFORT GLIDE LT LF

## (undated) DEVICE — APPLICATOR MEDICATED 26 CC SOLUTION HI LT ORNG CHLORAPREP

## (undated) DEVICE — IMPLANTABLE DEVICE
Type: IMPLANTABLE DEVICE | Site: ANKLE | Status: NON-FUNCTIONAL
Removed: 2023-03-04

## (undated) DEVICE — SUTURE VCRL SZ 1 L18IN ABSRB UD L36MM CT-1 1/2 CIR J841D

## (undated) DEVICE — RELOAD STPL L60MM H1.5-3.6MM REG TISS BLU GRIPPING SURF B

## (undated) DEVICE — STAPLER SKIN H3.9MM WIRE DIA0.58MM CRWN 6.9MM 35 STPL ROT

## (undated) DEVICE — SEALER ENDOSCP NANO COAT OPN DIV CRV L JAW LIGASURE IMPACT

## (undated) DEVICE — STAPLER INT L34CM 60MM LNG ENDOSCP ARTC PWR + ECHELON FLX

## (undated) DEVICE — GOWN,SIRUS,POLYRNF,BRTHSLV,XLN/XL,20/CS: Brand: MEDLINE

## (undated) DEVICE — BIT DRL L140MM DIA2MM QUIK CPL 3 FLUT CALIB DEPTH MRK W/O

## (undated) DEVICE — SUTURE ABSORBABLE MONOFILAMENT 2-0 CT1 18 IN VIO PDS + SXPP1B411

## (undated) DEVICE — TOTAL TRAY, 16FR 10ML SIL FOLEY, URN: Brand: MEDLINE

## (undated) DEVICE — BIT DRL L110MM DIA2.5MM G QUIK CPL W/O STP REUSE

## (undated) DEVICE — TOWEL,STOP FLAG GOLD N-W: Brand: MEDLINE